# Patient Record
Sex: MALE | Race: WHITE | NOT HISPANIC OR LATINO | Employment: FULL TIME | URBAN - METROPOLITAN AREA
[De-identification: names, ages, dates, MRNs, and addresses within clinical notes are randomized per-mention and may not be internally consistent; named-entity substitution may affect disease eponyms.]

---

## 2017-06-21 ENCOUNTER — COMMUNICATION - HEALTHEAST (OUTPATIENT)
Dept: NURSING | Facility: CLINIC | Age: 42
End: 2017-06-21

## 2017-06-21 ENCOUNTER — OFFICE VISIT - HEALTHEAST (OUTPATIENT)
Dept: FAMILY MEDICINE | Facility: CLINIC | Age: 42
End: 2017-06-21

## 2017-06-21 DIAGNOSIS — E78.1 PURE HYPERGLYCERIDEMIA: ICD-10-CM

## 2017-06-21 DIAGNOSIS — Z13.1 SCREENING FOR DIABETES MELLITUS: ICD-10-CM

## 2017-06-21 DIAGNOSIS — R76.8 THYROID ANTIBODY POSITIVE: ICD-10-CM

## 2017-06-21 DIAGNOSIS — I86.1 VARICOCELE: ICD-10-CM

## 2017-06-21 DIAGNOSIS — Z00.00 ROUTINE GENERAL MEDICAL EXAMINATION AT A HEALTH CARE FACILITY: ICD-10-CM

## 2017-06-21 LAB
CHOLEST SERPL-MCNC: 175 MG/DL
FASTING STATUS PATIENT QL REPORTED: YES
HBA1C MFR BLD: 5.5 % (ref 3.5–6)
HDLC SERPL-MCNC: 35 MG/DL
LDLC SERPL CALC-MCNC: 105 MG/DL
TRIGL SERPL-MCNC: 174 MG/DL

## 2017-06-21 ASSESSMENT — MIFFLIN-ST. JEOR: SCORE: 1812.25

## 2018-01-29 ENCOUNTER — RECORDS - HEALTHEAST (OUTPATIENT)
Dept: ADMINISTRATIVE | Facility: OTHER | Age: 43
End: 2018-01-29

## 2018-06-27 ENCOUNTER — OFFICE VISIT - HEALTHEAST (OUTPATIENT)
Dept: FAMILY MEDICINE | Facility: CLINIC | Age: 43
End: 2018-06-27

## 2018-06-27 DIAGNOSIS — Z13.0 SCREENING FOR ENDOCRINE, NUTRITIONAL, METABOLIC AND IMMUNITY DISORDER: ICD-10-CM

## 2018-06-27 DIAGNOSIS — L80 VITILIGO: ICD-10-CM

## 2018-06-27 DIAGNOSIS — Z13.21 SCREENING FOR ENDOCRINE, NUTRITIONAL, METABOLIC AND IMMUNITY DISORDER: ICD-10-CM

## 2018-06-27 DIAGNOSIS — Z13.29 SCREENING FOR ENDOCRINE, NUTRITIONAL, METABOLIC AND IMMUNITY DISORDER: ICD-10-CM

## 2018-06-27 DIAGNOSIS — Z13.228 SCREENING FOR ENDOCRINE, NUTRITIONAL, METABOLIC AND IMMUNITY DISORDER: ICD-10-CM

## 2018-06-27 DIAGNOSIS — Z00.00 VISIT FOR PREVENTIVE HEALTH EXAMINATION: ICD-10-CM

## 2018-06-27 DIAGNOSIS — K21.9 GASTROESOPHAGEAL REFLUX DISEASE WITHOUT ESOPHAGITIS: ICD-10-CM

## 2018-06-27 LAB
ALBUMIN SERPL-MCNC: 4.1 G/DL (ref 3.5–5)
ALP SERPL-CCNC: 75 U/L (ref 45–120)
ALT SERPL W P-5'-P-CCNC: 78 U/L (ref 0–45)
ANION GAP SERPL CALCULATED.3IONS-SCNC: 7 MMOL/L (ref 5–18)
AST SERPL W P-5'-P-CCNC: 31 U/L (ref 0–40)
BILIRUB SERPL-MCNC: 1 MG/DL (ref 0–1)
BUN SERPL-MCNC: 14 MG/DL (ref 8–22)
CALCIUM SERPL-MCNC: 9.9 MG/DL (ref 8.5–10.5)
CHLORIDE BLD-SCNC: 108 MMOL/L (ref 98–107)
CHOLEST SERPL-MCNC: 180 MG/DL
CO2 SERPL-SCNC: 27 MMOL/L (ref 22–31)
CREAT SERPL-MCNC: 0.85 MG/DL (ref 0.7–1.3)
FASTING STATUS PATIENT QL REPORTED: YES
GFR SERPL CREATININE-BSD FRML MDRD: >60 ML/MIN/1.73M2
GLUCOSE BLD-MCNC: 106 MG/DL (ref 70–125)
HBA1C MFR BLD: 5.6 % (ref 3.5–6)
HDLC SERPL-MCNC: 31 MG/DL
LDLC SERPL CALC-MCNC: 111 MG/DL
POTASSIUM BLD-SCNC: 4.7 MMOL/L (ref 3.5–5)
PROT SERPL-MCNC: 7.1 G/DL (ref 6–8)
SODIUM SERPL-SCNC: 142 MMOL/L (ref 136–145)
TRIGL SERPL-MCNC: 192 MG/DL
TSH SERPL DL<=0.005 MIU/L-ACNC: 2.37 UIU/ML (ref 0.3–5)

## 2018-06-27 ASSESSMENT — MIFFLIN-ST. JEOR: SCORE: 1825.86

## 2018-06-28 LAB — THYROID PEROXIDASE ANTIBODIES - HISTORICAL: 35.5 IU/ML (ref 0–5.6)

## 2019-06-18 ENCOUNTER — OFFICE VISIT - HEALTHEAST (OUTPATIENT)
Dept: FAMILY MEDICINE | Facility: CLINIC | Age: 44
End: 2019-06-18

## 2019-06-18 DIAGNOSIS — Z13.29 SCREENING FOR ENDOCRINE, NUTRITIONAL, METABOLIC AND IMMUNITY DISORDER: ICD-10-CM

## 2019-06-18 DIAGNOSIS — Z13.21 SCREENING FOR ENDOCRINE, NUTRITIONAL, METABOLIC AND IMMUNITY DISORDER: ICD-10-CM

## 2019-06-18 DIAGNOSIS — Z13.0 SCREENING FOR ENDOCRINE, NUTRITIONAL, METABOLIC AND IMMUNITY DISORDER: ICD-10-CM

## 2019-06-18 DIAGNOSIS — Z13.228 SCREENING FOR ENDOCRINE, NUTRITIONAL, METABOLIC AND IMMUNITY DISORDER: ICD-10-CM

## 2019-06-18 DIAGNOSIS — Z00.00 VISIT FOR PREVENTIVE HEALTH EXAMINATION: ICD-10-CM

## 2019-06-18 LAB
ALBUMIN SERPL-MCNC: 4.3 G/DL (ref 3.5–5)
ALP SERPL-CCNC: 79 U/L (ref 45–120)
ALT SERPL W P-5'-P-CCNC: 27 U/L (ref 0–45)
ANION GAP SERPL CALCULATED.3IONS-SCNC: 9 MMOL/L (ref 5–18)
AST SERPL W P-5'-P-CCNC: 18 U/L (ref 0–40)
BILIRUB SERPL-MCNC: 0.9 MG/DL (ref 0–1)
BUN SERPL-MCNC: 13 MG/DL (ref 8–22)
CALCIUM SERPL-MCNC: 9.6 MG/DL (ref 8.5–10.5)
CHLORIDE BLD-SCNC: 109 MMOL/L (ref 98–107)
CHOLEST SERPL-MCNC: 155 MG/DL
CO2 SERPL-SCNC: 25 MMOL/L (ref 22–31)
CREAT SERPL-MCNC: 0.91 MG/DL (ref 0.7–1.3)
FASTING STATUS PATIENT QL REPORTED: YES
GFR SERPL CREATININE-BSD FRML MDRD: >60 ML/MIN/1.73M2
GLUCOSE BLD-MCNC: 98 MG/DL (ref 70–125)
HBA1C MFR BLD: 5.6 % (ref 3.5–6)
HDLC SERPL-MCNC: 31 MG/DL
LDLC SERPL CALC-MCNC: 94 MG/DL
POTASSIUM BLD-SCNC: 4.6 MMOL/L (ref 3.5–5)
PROT SERPL-MCNC: 7.1 G/DL (ref 6–8)
SODIUM SERPL-SCNC: 143 MMOL/L (ref 136–145)
TRIGL SERPL-MCNC: 150 MG/DL
TSH SERPL DL<=0.005 MIU/L-ACNC: 2.39 UIU/ML (ref 0.3–5)

## 2019-06-18 ASSESSMENT — MIFFLIN-ST. JEOR: SCORE: 1796.56

## 2021-01-21 ENCOUNTER — OFFICE VISIT (OUTPATIENT)
Dept: FAMILY MEDICINE | Facility: CLINIC | Age: 46
End: 2021-01-21
Payer: COMMERCIAL

## 2021-01-21 VITALS
WEIGHT: 211.8 LBS | TEMPERATURE: 97.5 F | SYSTOLIC BLOOD PRESSURE: 131 MMHG | HEART RATE: 81 BPM | OXYGEN SATURATION: 98 % | DIASTOLIC BLOOD PRESSURE: 86 MMHG

## 2021-01-21 DIAGNOSIS — M79.89 SOFT TISSUE MASS: Primary | ICD-10-CM

## 2021-01-21 PROCEDURE — 99213 OFFICE O/P EST LOW 20 MIN: CPT | Performed by: FAMILY MEDICINE

## 2021-01-21 SDOH — HEALTH STABILITY: MENTAL HEALTH: HOW OFTEN DO YOU HAVE A DRINK CONTAINING ALCOHOL?: NOT ASKED

## 2021-01-21 SDOH — HEALTH STABILITY: MENTAL HEALTH: HOW OFTEN DO YOU HAVE 6 OR MORE DRINKS ON ONE OCCASION?: NOT ASKED

## 2021-01-21 SDOH — HEALTH STABILITY: MENTAL HEALTH: HOW MANY STANDARD DRINKS CONTAINING ALCOHOL DO YOU HAVE ON A TYPICAL DAY?: NOT ASKED

## 2021-01-21 NOTE — PROGRESS NOTES
"  Assessment & Plan     Soft tissue mass  Consistency of a lipoma.  -Will send for US of mass  -Patient would prefer to have lesion removed if possible  -Follow-up after US    20 minutes spent on the date of the encounter doing chart review, history and exam, documentation and further activities as noted above    DO CHIO [facility]    Modesto Mar is a 45 year old who presents to clinic today for the following health issues:    HPI   Patient here for lump on back. Noticed a lump about 1 week ago, no tenderness, no redness, \"it feels like he has a breast implant in his back\". Under the skin on top of the muscle. States area does not cause him any pain or discomfort. Denies SOB, chest pain, weight loss, other skin lesions.       Review of Systems   CONSTITUTIONAL: NEGATIVE for fever, chills, change in weight  RESP: NEGATIVE for significant cough or SOB  CV: NEGATIVE for chest pain, palpitations or peripheral edema  GI: NEGATIVE for nausea, abdominal pain, heartburn, or change in bowel habits  : NEGATIVE for frequency, dysuria, or hematuria  MUSCULOSKELETAL: NEGATIVE for significant arthralgias or myalgia      Objective    /86 (BP Location: Right arm, Patient Position: Sitting, Cuff Size: Adult Regular)   Pulse 81   Temp 97.5  F (36.4  C) (Temporal)   Wt 96.1 kg (211 lb 12.8 oz)   SpO2 98%   There is no height or weight on file to calculate BMI.  Physical Exam   GENERAL: healthy, alert and no distress  EYES: Eyes grossly normal to inspection, conjunctivae and sclerae normal  NECK: no adenopathy, no asymmetry, masses, or scars and thyroid normal to palpation  RESP: lungs clear to auscultation - no rales, rhonchi or wheezes  CV: regular rate and rhythm, normal S1 S2, no S3 or S4, no murmur, click or rub, no peripheral edema and peripheral pulses strong  Back: left mid-back with 10 cm ovid soft-tissue mass, no pain to palpation, no erythema, no central pore or " opening.   SKIN: no suspicious lesions or rashes  NEURO: Normal strength and tone, mentation intact and speech normal  PSYCH: mentation appears normal, affect normal/bright

## 2021-02-08 ENCOUNTER — AMBULATORY - HEALTHEAST (OUTPATIENT)
Dept: NURSING | Facility: CLINIC | Age: 46
End: 2021-02-08

## 2021-02-10 ENCOUNTER — OFFICE VISIT (OUTPATIENT)
Dept: FAMILY MEDICINE | Facility: CLINIC | Age: 46
End: 2021-02-10
Payer: COMMERCIAL

## 2021-02-10 VITALS
DIASTOLIC BLOOD PRESSURE: 84 MMHG | RESPIRATION RATE: 16 BRPM | OXYGEN SATURATION: 96 % | TEMPERATURE: 98.1 F | SYSTOLIC BLOOD PRESSURE: 122 MMHG | WEIGHT: 209 LBS | HEART RATE: 56 BPM

## 2021-02-10 DIAGNOSIS — M54.12 LEFT CERVICAL RADICULOPATHY: Primary | ICD-10-CM

## 2021-02-10 PROCEDURE — 99213 OFFICE O/P EST LOW 20 MIN: CPT | Performed by: FAMILY MEDICINE

## 2021-02-10 RX ORDER — CYCLOBENZAPRINE HCL 10 MG
5-10 TABLET ORAL 3 TIMES DAILY PRN
Qty: 21 TABLET | Refills: 0 | Status: SHIPPED | OUTPATIENT
Start: 2021-02-10 | End: 2021-04-13

## 2021-02-10 NOTE — PROGRESS NOTES
Assessment & Plan     Left cervical radiculopathy  I am not sure if his symptoms are from neck versus shoulder.  Due to persistent nature of his symptoms it would be reasonable to start with physical therapy and Flexeril.  We agreed to hold off on oral steroid for now.  He does not have any significant weakness and we agreed to hold off on seeing sports medicine for now.  If symptoms are persistent he can send me a message to get a sports medicine referral.  - cyclobenzaprine (FLEXERIL) 10 MG tablet; Take 0.5-1 tablets (5-10 mg) by mouth 3 times daily as needed for muscle spasms  - CRISTAL PT, HAND, AND CHIROPRACTIC REFERRAL; Future     Regis Marino MD, MD  [facility]    Modesto Mar is a 45 year old who presents for the following health issues     HPI         Musculoskeletal problem/pain  Onset/Duration: 1 month   Description  Location: neck and shoulder - left  Joint Swelling: no  Redness: no  Pain: YES- mild  Warmth: no  Intensity:  moderate  Progression of Symptoms:  same  Accompanying signs and symptoms:   Fevers: no  Numbness/tingling/weakness: YES- down the arm   History  Trauma to the area: no  Recent illness:  no  Previous similar problem: no  Previous evaluation:  no  Precipitating or alleviating factors:  Aggravating factors include: different positions.   Therapies tried and outcome: moving neck helps     No specific trigger. No trauma.   Goes to left arm area. Goes to fingers - mostly index and middle finger and sometime ring finger.   Optometrist - sometime affects daily activity. No weakness. If moves neck in certain position it improves.   Avoiding nsaids.   No change in personal lifestyle.   Comes and goes. Certain neck position makes it better.   No chest heaviness. No worsening of symptoms with exertion.   No premature cardiac disease.   Father with diabetes.   Has a lipoma on his back and he will be having an ultrasound.  Wondering if it is  related.    Review of Systems         Objective    /84 (BP Location: Left arm, Patient Position: Sitting, Cuff Size: Adult Regular)   Pulse 56   Temp 98.1  F (36.7  C) (Oral)   Resp 16   Wt 94.8 kg (209 lb)   SpO2 96%   There is no height or weight on file to calculate BMI.  Physical Exam   Just below left shoulder blade large lipoma like structure present.  Neck range of motion not restricted.  Left shoulder range of motion not restricted.  Empty can sign negative.  Tinel sign negative both at elbow and wrist.  Phalen sign negative.  Reflexes are normal and symmetrical.

## 2021-02-10 NOTE — PATIENT INSTRUCTIONS
We are working hard to begin vaccinating more people against COVID-19. Currently, we are only vaccinating Phase 1a workers - healthcare workers who are unable to do their job remotely. Vaccine availability is very limited.      If you are a healthcare worker and you are unable to do your job remotely, please log in to [employer] using this link to see if we have openings and schedule an appointment. At your vaccine appointment, you will be asked to provide proof of employment as a health care worker. If you cannot, you will be turned away.     Vaccine appointments are being added as they become available. Please check your [employer] account frequently for availability.  If you have technical difficulty using [employer], call [phone] for assistance.     You can learn more about the state's phased approach to administering the vaccine, with details on each phase, here.      Phase 1b is the next group that will get vaccinated and includes frontline essential workers and adults 75 years of age and older. When we are able to start vaccinating this group, we will share that information on our website. Check this website to stay up to date on COVID-19 vaccination information.        Did you know?      You can schedule a video visit for follow-up appointments as well as future appointments for certain conditions.  Please see the below link.     [email or web address]    If you have not already done so,  I encourage you to sign up for [employer] ([email or web address]).  This will allow you to review your results, securely communicate with a provider, and schedule virtual visits as well.

## 2021-02-15 ENCOUNTER — ANCILLARY PROCEDURE (OUTPATIENT)
Dept: ULTRASOUND IMAGING | Facility: CLINIC | Age: 46
End: 2021-02-15
Attending: FAMILY MEDICINE
Payer: COMMERCIAL

## 2021-02-15 ENCOUNTER — THERAPY VISIT (OUTPATIENT)
Dept: PHYSICAL THERAPY | Facility: CLINIC | Age: 46
End: 2021-02-15
Attending: FAMILY MEDICINE
Payer: COMMERCIAL

## 2021-02-15 DIAGNOSIS — M54.12 CERVICAL RADICULITIS: ICD-10-CM

## 2021-02-15 DIAGNOSIS — M79.89 SOFT TISSUE MASS: ICD-10-CM

## 2021-02-15 DIAGNOSIS — M54.12 LEFT CERVICAL RADICULOPATHY: ICD-10-CM

## 2021-02-15 PROCEDURE — 97110 THERAPEUTIC EXERCISES: CPT | Mod: GP | Performed by: PHYSICAL THERAPIST

## 2021-02-15 PROCEDURE — 97161 PT EVAL LOW COMPLEX 20 MIN: CPT | Mod: GP | Performed by: PHYSICAL THERAPIST

## 2021-02-15 PROCEDURE — 76705 ECHO EXAM OF ABDOMEN: CPT | Performed by: STUDENT IN AN ORGANIZED HEALTH CARE EDUCATION/TRAINING PROGRAM

## 2021-02-15 NOTE — PROGRESS NOTES
[facility] Initial Evaluation  Subjective:  The history is provided by the patient.   Therapist Generated HPI Evaluation  Problem details: Pt presents with numbness/tingling in LUE. Started about 1 month ago. Notes gradual onset, no insidious onset or injury incident. Locates numbness/tingling from lateral LUE in mid arm, travels down posterior forearm and into digits 2-4. Also notes some tightness in L upper trap down into medial scap area. Symptoms are intermittent. Does report a history of H/A symptoms on occasion that are very minor. Symptoms worsen when he has to raise his arm and randomly throughout the day. Symptoms improve with specific head position of slight flexion and L rotation. Has been trying Flexeril. Pt is an optometrist..         Type of problem:  Cervical spine.                                                 Objective:  System              Cervical/Thoracic Evaluation    AROM:  AROM Cervical:    Flexion:          100% ROM, reduces sxs in LUE  Extension:       75% ROM, 3-4/10 LUE n/t  Rotation:         Left: 75% ROM, increases in n/t with increase in tightness in L upper trap     Right: 100% ROM, reduces n/t to 0/10  Side Bend:      Left:     Right:       Headaches: cervical  Cervical Myotomes:  normal                  DTR's:  normal        Neural Tension:    Left side:  Median positive.  Left side:  Radial and Ulnar  negative.    Cervical Dermatomes:  normal                                                                          General Evaluation:      Gross Strength:       Strength:  70#, 71#, 71#                            Posture:    Standing:   Rounded shoulders, forward head and thoracic kyphosis increased  Sitting:  Rounded shoulders, forward head and thoracic kyphosis increased                                           ROS    Assessment/Plan:    Patient is a 45 year old male with LUE complaints.    Patient has the following significant findings with corresponding  treatment plan.                Diagnosis 1:  LUE Cervical Radiculopathy  Pain -  hot/cold therapy, US, electric stimulation, mechanical traction, manual therapy, splint/taping/bracing/orthotics, self management, education, directional preference exercise and home program  Decreased ROM/flexibility - manual therapy, therapeutic exercise, therapeutic activity and home program  Impaired muscle performance - neuro re-education and home program  Decreased function - therapeutic activities and home program  Impaired posture - neuro re-education, therapeutic activities and home program    Therapy Evaluation Codes:   1) History comprised of:   Personal factors that impact the plan of care:      None.    Comorbidity factors that impact the plan of care are:      None.     Medications impacting care: None.  2) Examination of Body Systems comprised of:   Body structures and functions that impact the plan of care:      Cervical spine.   Activity limitations that impact the plan of care are:      Reaching .  3) Clinical presentation characteristics are:   Stable/Uncomplicated.  4) Decision-Making    Low complexity using standardized patient assessment instrument and/or measureable assessment of functional outcome.  Cumulative Therapy Evaluation is: Low complexity.    Previous and current functional limitations:  (See Goal Flow Sheet for this information)    Short term and Long term goals: (See Goal Flow Sheet for this information)     Communication ability:  Patient appears to be able to clearly communicate and understand verbal and written communication and follow directions correctly.  Treatment Explanation - The following has been discussed with the patient:   RX ordered/plan of care  Anticipated outcomes  Possible risks and side effects  This patient would benefit from PT intervention to resume normal activities.   Rehab potential is good.    Frequency:  1 X week, once daily  Duration:  for 8 weeks  Discharge Plan:  Achieve  all LTG.  Independent in home treatment program.  Reach maximal therapeutic benefit.    Please refer to the daily flowsheet for treatment today, total treatment time and time spent performing 1:1 timed codes.

## 2021-02-18 ENCOUNTER — MYC MEDICAL ADVICE (OUTPATIENT)
Dept: FAMILY MEDICINE | Facility: CLINIC | Age: 46
End: 2021-02-18

## 2021-02-18 DIAGNOSIS — D17.30 LIPOMA OF SKIN AND SUBCUTANEOUS TISSUE: Primary | ICD-10-CM

## 2021-02-18 NOTE — TELEPHONE ENCOUNTER
Dr Reynolds,    From 2/15/21 US    Impression: Large left lateral upper back soft tissue mass with  imaging features most suggestive of a lipoma.  Consider surgical  consultation given size    General surgery referral started for you to sign if ok or advise otherwise.     Ruthy Butler, RN, BSN

## 2021-02-18 NOTE — TELEPHONE ENCOUNTER
Writer responded via [employer]  KATHERYN JonesN, RN  [facility]

## 2021-03-01 ENCOUNTER — AMBULATORY - HEALTHEAST (OUTPATIENT)
Dept: NURSING | Facility: CLINIC | Age: 46
End: 2021-03-01

## 2021-03-01 NOTE — TELEPHONE ENCOUNTER
REFERRAL INFORMATION:    Referring Provider:  Dr. Efren Reynolds     Referring Clinic:  [facility]     Reason for Visit/Diagnosis: Back mass       FUTURE VISIT INFORMATION:    Appointment Date: 3/22/2021    Appointment Time: 11:30 AM      NOTES RECORD STATUS  DETAILS   OFFICE NOTE from Referring Provider Internal 1/21/2021 Office visit with Dr. Reynolds    OFFICE NOTE from Other Specialists N/A    HOSPITAL DISCHARGE SUMMARY/ ED VISITS  N/A    OPERATIVE REPORT N/A    ENDOSCOPY (EGD)  N/A    PERTINENT LABS Care Everywhere    PATHOLOGY REPORTS (RELATED) N/A    IMAGING (CT, MRI, US, XR)  Internal US Abdomen: 2/15/2021

## 2021-03-07 ENCOUNTER — HEALTH MAINTENANCE LETTER (OUTPATIENT)
Age: 46
End: 2021-03-07

## 2021-03-08 ENCOUNTER — THERAPY VISIT (OUTPATIENT)
Dept: PHYSICAL THERAPY | Facility: CLINIC | Age: 46
End: 2021-03-08
Payer: COMMERCIAL

## 2021-03-08 DIAGNOSIS — M54.12 CERVICAL RADICULITIS: ICD-10-CM

## 2021-03-08 PROCEDURE — 97110 THERAPEUTIC EXERCISES: CPT | Mod: GP | Performed by: PHYSICAL THERAPIST

## 2021-03-08 PROCEDURE — 97530 THERAPEUTIC ACTIVITIES: CPT | Mod: GP | Performed by: PHYSICAL THERAPIST

## 2021-03-15 ENCOUNTER — THERAPY VISIT (OUTPATIENT)
Dept: PHYSICAL THERAPY | Facility: CLINIC | Age: 46
End: 2021-03-15
Payer: COMMERCIAL

## 2021-03-15 DIAGNOSIS — M54.12 CERVICAL RADICULITIS: ICD-10-CM

## 2021-03-15 PROCEDURE — 97112 NEUROMUSCULAR REEDUCATION: CPT | Mod: GP | Performed by: PHYSICAL THERAPIST

## 2021-03-15 PROCEDURE — 97110 THERAPEUTIC EXERCISES: CPT | Mod: GP | Performed by: PHYSICAL THERAPIST

## 2021-03-19 ENCOUNTER — PATIENT OUTREACH (OUTPATIENT)
Dept: SURGERY | Facility: CLINIC | Age: 46
End: 2021-03-19

## 2021-03-19 NOTE — PROGRESS NOTES
Pre Visit Call and Assessment    Date of call:  03/19/2021    Phone numbers:  Cell number on file:    Telephone Information:   Mobile [phone]       Reached patient/confirmed appointment:  No - left message:   on cell phone  Patient care team/Primary provider:  No Ref-Primary, Physician  Preferred outpatient pharmacy:    [id number] - [place], [place] - [address]  [address]  [place] [address]  Phone: [phone] Fax: [phone]    [facility]/pharmacy #[id number] - [place], [place] - [address]  [address]  [place] [address]  Phone: [phone] Fax: [phone]    Referred to:  Dr. Daniel Barbosa    Reason for visit:  mass consult

## 2021-03-22 ENCOUNTER — TELEPHONE (OUTPATIENT)
Dept: SURGERY | Facility: CLINIC | Age: 46
End: 2021-03-22

## 2021-03-22 ENCOUNTER — THERAPY VISIT (OUTPATIENT)
Dept: PHYSICAL THERAPY | Facility: CLINIC | Age: 46
End: 2021-03-22
Payer: COMMERCIAL

## 2021-03-22 ENCOUNTER — TELEPHONE (OUTPATIENT)
Dept: PHYSICAL THERAPY | Facility: CLINIC | Age: 46
End: 2021-03-22

## 2021-03-22 ENCOUNTER — OFFICE VISIT (OUTPATIENT)
Dept: SURGERY | Facility: CLINIC | Age: 46
End: 2021-03-22
Attending: FAMILY MEDICINE
Payer: COMMERCIAL

## 2021-03-22 ENCOUNTER — PRE VISIT (OUTPATIENT)
Dept: SURGERY | Facility: CLINIC | Age: 46
End: 2021-03-22

## 2021-03-22 VITALS
HEIGHT: 71 IN | WEIGHT: 209 LBS | OXYGEN SATURATION: 99 % | HEART RATE: 58 BPM | RESPIRATION RATE: 18 BRPM | BODY MASS INDEX: 29.26 KG/M2 | SYSTOLIC BLOOD PRESSURE: 124 MMHG | TEMPERATURE: 98.4 F | DIASTOLIC BLOOD PRESSURE: 87 MMHG

## 2021-03-22 DIAGNOSIS — M54.12 CERVICAL RADICULITIS: ICD-10-CM

## 2021-03-22 DIAGNOSIS — D17.30 LIPOMA OF SKIN AND SUBCUTANEOUS TISSUE: ICD-10-CM

## 2021-03-22 PROCEDURE — 99202 OFFICE O/P NEW SF 15 MIN: CPT | Performed by: SURGERY

## 2021-03-22 PROCEDURE — 97112 NEUROMUSCULAR REEDUCATION: CPT | Mod: GP | Performed by: PHYSICAL THERAPIST

## 2021-03-22 PROCEDURE — 97110 THERAPEUTIC EXERCISES: CPT | Mod: GP | Performed by: PHYSICAL THERAPIST

## 2021-03-22 RX ORDER — ASCORBIC ACID 500 MG
500 TABLET ORAL
COMMUNITY

## 2021-03-22 ASSESSMENT — PAIN SCALES - GENERAL: PAINLEVEL: NO PAIN (0)

## 2021-03-22 ASSESSMENT — MIFFLIN-ST. JEOR: SCORE: 1845.15

## 2021-03-22 NOTE — NURSING NOTE
Pre and Post op Patient Education/Teaching Flowsheet  Relevant Diagnosis:  Mass  Teaching Topic:  Pre and post op teaching  Person(s) Involved in teaching:  Patient     Motivation Level:  Asks Questions:  Yes  Eager to Learn:  Yes  Cooperative:  Yes  Receptive (willing/able to accept information):  Yes  Any cultural factors/Hindu beliefs that may influence understanding or compliance?  No    Patient/caregiver/family demonstrates understanding of the following:  Reason for the appointment, diagnosis, and treatment plan:  Yes  Patient demonstrates understanding of the following:  Pre-op bowel prep:  No  Post-op pain management recommendations (medications, ice compress, binder/athletic supporter (if applicable), etc.:  Yes  Inguinal hernia patients:  Post-op urinary retention- discussed signs/symptoms and visit to ER for Manzano catheter placement and to stay in place for at least 48 hours:  Yes  Restrictions:  Yes  Medications to take the day of surgery:  Per PCP  Blood thinner medications discussed and when to stop (if applicable):  Yes  Wound care:  Yes  Diabetes medication management (if applicable):  Per PCP  Which situations necessitate calling provider and whom to contact:  Discussed how to contact the hospital, nurse, and clinic scheduling staff if necessary      Date and time of surgery:  TBD  Location of surgery: [facility]- 5th Floor  History and Physical and any other testing necessary prior to surgery:  Yes- surgeon will do day of procedure  Required time line for completion of History and Physical and any pre-op testing:  Yes  Discuss need for someone to drive patient home and stay with them for 24 hours:  No  Pre-op showering/scrub information with Surgical Scrub:  Yes  NPO Guidelines:  No restrictions  COVID-19 Testing:  Yes    Infection Prevention: Patient demonstrates understanding of the following:  Patient instructed on hand hygiene:  Yes  Surgical procedure  site care will be taught and will be reviewed at the time of discharge  Signs and symptoms of infection taught:  Yes  Wound care reviewed and will be taught at the time of discharge  Central venous catheter care will be taught at the time of discharge (if applicable)    Post-op follow-up:  Instructional materials used/given/mailed:  [facility] Booklet, post op teaching sheet, Map, Soap, and arrival/location information    Surgical instructions mailed to patient

## 2021-03-22 NOTE — LETTER
"3/22/2021       RE: Zaid Xie  [address]  [place] [address]     Dear Colleague,    Thank you for referring your patient, Zaid Xie, to the [facility] [place] [facility]. Please see a copy of my visit note below.    I saw Zaid Xie in clinic today in evaluation of a left back mass.  He noted it just a few months ago- unsure how long it has been present.  Just noticed it one day- no pain or issues with the site.  He has intermittent left arm pain/numbness that he is having worked up for a cervical spine issue.      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  He thinks he has gained about 10 lbs in the past year.    He obtained an US of the site recently that was consistent with a lipoma.  He desires its excision.      Does not take medications.  Has been vaccinated for covid    PE:  /87 (BP Location: Left arm, Patient Position: Sitting, Cuff Size: Adult Regular)   Pulse 58   Temp 98.4  F (36.9  C) (Oral)   Resp 18   Ht 1.803 m (5' 11\")   Wt 94.8 kg (209 lb)   SpO2 99%   BMI 29.15 kg/m      A+Ox3, NAD  RRR (HR in 50s)  No resp distress or wheezing  Abd soft  Along his left back, below the scapula there is a 10cm mobile mass.  Nontender, no skin changes. This feels superficial to the muscle and not adherent to surrounding structures.    A/P:  Large subcutaneous mass- no clear diagnosis.  He desires its excision and I agree that is appropriate.  It is not related to his intermittent numbness in his left arm (well away from any nerve to the arm).    -Excision under local  -Update H+P day of    Again, thank you for allowing me to participate in the care of your patient.      Sincerely,    Daniel Barbosa MD      "

## 2021-03-22 NOTE — PATIENT INSTRUCTIONS
You met with Dr. Daniel Barbosa.      Today's visit instructions:    Reminder:  Surgery Requirements  1. Your surgery will be at the [facility]- 5th Floor  2. You will need to arrive 1 hour early based on the location of your surgery.  3. You will need a Pre-op physical before your procedure- your surgeon will do this the day of surgery.   4. Stop any blood thinners, vitamins, minerals, or herbal supplements 5 days before surgery.  If you are taking a prescribed blood thinner please let us know for specific instructions.  5. You may eat/drink/drive without restrictions/limitations.  6. Wash with the soap (Antibacterial, Dial Complete Foam, Hibiclense, or soap given/mailed from the clinic) the night before and morning of surgery. See instructions in the Surgery Packet.  7. If you would like a procedure estimate please call Tona WOODS Financial Counselor at [phone] or [phone].    At this time, any patient that does not have COVID-19 testing within 4 days of surgery and results available to the surgeon and anesthesia team before the procedure may have their procedure postponed or canceled. We do this to keep our patients, providers, and employees safe. If you decline to test, then you will need to contact your surgeon to determine when or if your procedure will still take place.    OR    We highly encourage patients to get tested for COVID-19 at one of our designated [facility] testing sites. We process the tests in our lab, which allows us to get the results quickly. If you choose to get tested at a non-[facility] location, you will need to contact your primary care provider to make those arrangements and ensure the results are available to your surgeon before you arrive for your procedure. If we do not receive the results in time, your procedure may be postponed or canceled. Please make sure your test is collected 3-days prior to your procedure date. The  results will need to get faxed to [phone].     If you have questions please contact Trish ABRAMS during regular clinic hours, Monday through Friday 7:30 AM - 4:00 PM, or you can contact us via [employer] at anytime.       If you have urgent needs after-hours, weekends, or holidays please call the hospital at [phone] and ask to speak with our on-call General Surgery Team.    Appointment [phone], option #1   Nurse Advice (Trish): [phone]   Surgery Scheduler (Aline): [phone]  Fax: [phone]    After Your Mass/Lump Removal       Incision care     You may take a shower the day after surgery. Carefully wash your incision with soap and water. Do not submerge yourself in water (bath, whirlpool, hot tub, pool, lake) for 14 days after surgery.     Remove the gauze bandage 24 hours after surgery, but leave the medical tape (Steri-Strips) or glue in place. These will loosen and fall off on their own 5 to 7 days after surgery.       Always wash your hands before touching your incisions or removing bandages.     It is not unusual to form a collection of fluid or blood under your incision that may feel firm or squishy- it can take several weeks to months for your body to reabsorb it.  At times, it may even drain.  If that should happen keep the area clean with soap, water,  and cover with a clean gauze dressing. You can change this daily or as needed.     Other medicines     Wait to start aspirin or blood thinners until the day after surgery. You can continue your regular medicines at your normal time the day after surgery.     Your pain medicine may cause constipation (hard, dry stools). To help with this, take the stool softener your doctor gave you or an over-the-counter stool softener or laxative. You can stop taking this when you are no longer taking pain medicine and your bowel movements are back to normal.      For pain or discomfort     Take the narcotic pain medicine your doctor gave you  as needed and as instructed on the bottle. If you prefer to use over-the-counter medication, use acetaminophen (Tylenol) or ibuprofen (Advil, Motrin) as instructed on the box. Do not take Tylenol if it is in your narcotic pain medication.      Use an ice pack on your surgical cut (incision) for 20 minutes at a time as needed for the first 24 hours. Be sure to protect your skin by putting a cloth between the ice pack and your skin.      Activities   No driving until you feel it s safe to do so. Don t drive while taking narcotic pain medicine.      Diet   You can eat your regular meals after surgery.      Results   You should know any test results about 5 to 7 business days after surgery       When to call the doctor   Call your doctor if you have:     A fever above 101 F (38.3 C) (taken under the tongue), or a fever or chills lasting more than a day.     Redness at the incision site.     Any fluid or blood draining from the incision, especially if it smells bad.      Severe pain that doesn t improve with pain medicine.      We will call you 2 to 4 days after surgery to review this handout, answer questions and help arrange after-surgery care. If you have questions or concerns, please call [phone] during regular office hours. If you need to call after business hours, call [phone] and ask to page the surgeon on-call.

## 2021-03-22 NOTE — PROGRESS NOTES
"I saw Zaid Xie in clinic today in evaluation of a left back mass.  He noted it just a few months ago- unsure how long it has been present.  Just noticed it one day- no pain or issues with the site.  He has intermittent left arm pain/numbness that he is having worked up for a cervical spine issue.      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  He thinks he has gained about 10 lbs in the past year.    He obtained an US of the site recently that was consistent with a lipoma.  He desires its excision.      Does not take medications.  Has been vaccinated for covid    PE:  /87 (BP Location: Left arm, Patient Position: Sitting, Cuff Size: Adult Regular)   Pulse 58   Temp 98.4  F (36.9  C) (Oral)   Resp 18   Ht 1.803 m (5' 11\")   Wt 94.8 kg (209 lb)   SpO2 99%   BMI 29.15 kg/m      A+Ox3, NAD  RRR (HR in 50s)  No resp distress or wheezing  Abd soft  Along his left back, below the scapula there is a 10cm mobile mass.  Nontender, no skin changes. This feels superficial to the muscle and not adherent to surrounding structures.    A/P:  Large subcutaneous mass- no clear diagnosis.  He desires its excision and I agree that is appropriate.  It is not related to his intermittent numbness in his left arm (well away from any nerve to the arm).    -Excision under local  -Update H+P day of  "

## 2021-03-22 NOTE — NURSING NOTE
"Chief Complaint   Patient presents with     Consult     Pt here for mass consult       Vitals:    03/22/21 1124   BP: 124/87   BP Location: Left arm   Patient Position: Sitting   Cuff Size: Adult Regular   Pulse: 58   Resp: 18   Temp: 98.4  F (36.9  C)   TempSrc: Oral   SpO2: 99%   Weight: 94.8 kg (209 lb)   Height: 1.803 m (5' 11\")       Body mass index is 29.15 kg/m .      DERRICK DaviesT                      "

## 2021-03-22 NOTE — TELEPHONE ENCOUNTER
Case Request received to schedule an outpatient procedure under local anesthesia with Dr. Daniel Barbosa at the [facility] - procedures area if possible. H&P will be completed with surgeon on DOS.     msg left for patient to call back regarding scheduling. [employer] message also sent to patient to reply or call regarding scheduling.    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 I messaged ALIZA Chambers asking if any upcoming Mondays would work for Dr. Barbosa to do patient's procedure at the [facility]. I told him tentatively 4/19, but that I'd also have to clear it with [facility] OR scheduling - may only work if can be in the procedures area. Patient said Wednesday mornings also work well, but I told him Dr. Barbosa usually has clinic on Weds mornings. I told him I'd check on Mondays, and specifically about 4/19/2021, and get back to him to confirm or look at other dates.

## 2021-03-23 PROBLEM — D17.30 LIPOMA OF SKIN AND SUBCUTANEOUS TISSUE: Status: ACTIVE | Noted: 2021-03-23

## 2021-03-31 NOTE — TELEPHONE ENCOUNTER
Patient had some concerns about scheduling surgery soon after another matter the weekend prior to Monday 4/19/2021. So, scheduled surgery the following Monday 4/26/2021 instead.    Scheduling completed as follows, then Surgery Packet sent to patient via MyChart and Mail. Patient is having Local anesthesia only, so we discussed no eating or drinking restrictions, and no driving restrictions.    Your surgery is scheduled:    Date: Monday April 26, 2021  ________________________________    Time: 7:15 AM*  ________________________________    Please arrive at:  6:15 AM*  ______________________    Surgeon's Name: Dr. Daniel Barbosa  _______________________        Pre-Op Physical Fax Numbers:         [email or web address] Fax: [phone] / Phone:  [phone]        Your surgery is located at:  [facility]  [address] [place] [id number]  [phone]      *Times are tentative and may change. You can expect a call from the pre-admission nurses to confirm arrival and surgery start time if the times should change.    Pre-operative Physical appointment:   - to be scheduled/ completed by you at [facility] within 30 days before your surgery date (your plan was to combine this with an annual physical at the above clinic; if this plan changes, please let us know)    Pre-operative COVID-19 Test:   Pre-procedure asymptomatic COVID PCR:  4/22/2021 at 1:30 PM                                                 [facility]        [address] [place] [address]

## 2021-04-05 ENCOUNTER — THERAPY VISIT (OUTPATIENT)
Dept: PHYSICAL THERAPY | Facility: CLINIC | Age: 46
End: 2021-04-05
Payer: COMMERCIAL

## 2021-04-05 DIAGNOSIS — M54.12 CERVICAL RADICULITIS: ICD-10-CM

## 2021-04-05 PROCEDURE — 97110 THERAPEUTIC EXERCISES: CPT | Mod: GP | Performed by: PHYSICAL THERAPIST

## 2021-04-05 PROCEDURE — 97112 NEUROMUSCULAR REEDUCATION: CPT | Mod: GP | Performed by: PHYSICAL THERAPIST

## 2021-04-10 DIAGNOSIS — Z11.59 ENCOUNTER FOR SCREENING FOR OTHER VIRAL DISEASES: Primary | ICD-10-CM

## 2021-04-13 PROBLEM — I86.1 VARICOCELE: Status: ACTIVE | Noted: 2017-06-21

## 2021-04-13 PROBLEM — K21.9 ESOPHAGEAL REFLUX: Status: ACTIVE | Noted: 2021-04-13

## 2021-04-13 PROBLEM — E78.1 ESSENTIAL HYPERTRIGLYCERIDEMIA: Status: ACTIVE | Noted: 2021-04-13

## 2021-04-13 PROBLEM — K21.9 ESOPHAGEAL REFLUX: Status: RESOLVED | Noted: 2021-04-13 | Resolved: 2021-04-13

## 2021-04-14 ENCOUNTER — OFFICE VISIT (OUTPATIENT)
Dept: FAMILY MEDICINE | Facility: CLINIC | Age: 46
End: 2021-04-14
Payer: COMMERCIAL

## 2021-04-14 VITALS
WEIGHT: 207 LBS | HEIGHT: 71 IN | SYSTOLIC BLOOD PRESSURE: 122 MMHG | OXYGEN SATURATION: 98 % | BODY MASS INDEX: 28.98 KG/M2 | TEMPERATURE: 98.2 F | DIASTOLIC BLOOD PRESSURE: 76 MMHG | RESPIRATION RATE: 16 BRPM | HEART RATE: 61 BPM

## 2021-04-14 DIAGNOSIS — Z02.89 PHYSICAL EXAM FOR CAMP: ICD-10-CM

## 2021-04-14 DIAGNOSIS — L80 VITILIGO: ICD-10-CM

## 2021-04-14 DIAGNOSIS — Z01.818 PRE-OPERATIVE GENERAL PHYSICAL EXAMINATION: Primary | ICD-10-CM

## 2021-04-14 DIAGNOSIS — E78.1 ESSENTIAL HYPERTRIGLYCERIDEMIA: ICD-10-CM

## 2021-04-14 DIAGNOSIS — Z71.89 ADVANCED DIRECTIVES, COUNSELING/DISCUSSION: ICD-10-CM

## 2021-04-14 DIAGNOSIS — R73.01 IMPAIRED FASTING GLUCOSE: ICD-10-CM

## 2021-04-14 DIAGNOSIS — Z83.3 FAMILY HISTORY OF DIABETES MELLITUS: ICD-10-CM

## 2021-04-14 DIAGNOSIS — Z11.4 SCREENING FOR HIV (HUMAN IMMUNODEFICIENCY VIRUS): ICD-10-CM

## 2021-04-14 DIAGNOSIS — Z11.59 NEED FOR HEPATITIS C SCREENING TEST: ICD-10-CM

## 2021-04-14 DIAGNOSIS — D17.30 LIPOMA OF SKIN AND SUBCUTANEOUS TISSUE: ICD-10-CM

## 2021-04-14 LAB
ALBUMIN SERPL-MCNC: 4.2 G/DL (ref 3.4–5)
ALP SERPL-CCNC: 83 U/L (ref 40–150)
ALT SERPL W P-5'-P-CCNC: 63 U/L (ref 0–70)
ANION GAP SERPL CALCULATED.3IONS-SCNC: 5 MMOL/L (ref 3–14)
AST SERPL W P-5'-P-CCNC: 27 U/L (ref 0–45)
BASOPHILS # BLD AUTO: 0 10E9/L (ref 0–0.2)
BASOPHILS NFR BLD AUTO: 0.5 %
BILIRUB SERPL-MCNC: 0.7 MG/DL (ref 0.2–1.3)
BUN SERPL-MCNC: 12 MG/DL (ref 7–30)
CALCIUM SERPL-MCNC: 8.9 MG/DL (ref 8.5–10.1)
CHLORIDE SERPL-SCNC: 109 MMOL/L (ref 94–109)
CHOLEST SERPL-MCNC: 156 MG/DL
CO2 SERPL-SCNC: 28 MMOL/L (ref 20–32)
CREAT SERPL-MCNC: 0.87 MG/DL (ref 0.66–1.25)
DIFFERENTIAL METHOD BLD: ABNORMAL
EOSINOPHIL # BLD AUTO: 0.2 10E9/L (ref 0–0.7)
EOSINOPHIL NFR BLD AUTO: 4.6 %
ERYTHROCYTE [DISTWIDTH] IN BLOOD BY AUTOMATED COUNT: 11.9 % (ref 10–15)
GFR SERPL CREATININE-BSD FRML MDRD: >90 ML/MIN/{1.73_M2}
GLUCOSE SERPL-MCNC: 104 MG/DL (ref 70–99)
HCT VFR BLD AUTO: 43.3 % (ref 40–53)
HDLC SERPL-MCNC: 37 MG/DL
HGB BLD-MCNC: 14.8 G/DL (ref 13.3–17.7)
LDLC SERPL CALC-MCNC: 91 MG/DL
LYMPHOCYTES # BLD AUTO: 1.4 10E9/L (ref 0.8–5.3)
LYMPHOCYTES NFR BLD AUTO: 34.5 %
MCH RBC QN AUTO: 31 PG (ref 26.5–33)
MCHC RBC AUTO-ENTMCNC: 34.2 G/DL (ref 31.5–36.5)
MCV RBC AUTO: 91 FL (ref 78–100)
MONOCYTES # BLD AUTO: 0.4 10E9/L (ref 0–1.3)
MONOCYTES NFR BLD AUTO: 11.2 %
NEUTROPHILS # BLD AUTO: 1.9 10E9/L (ref 1.6–8.3)
NEUTROPHILS NFR BLD AUTO: 49.2 %
NONHDLC SERPL-MCNC: 119 MG/DL
PLATELET # BLD AUTO: 145 10E9/L (ref 150–450)
POTASSIUM SERPL-SCNC: 4.3 MMOL/L (ref 3.4–5.3)
PROT SERPL-MCNC: 7.5 G/DL (ref 6.8–8.8)
RBC # BLD AUTO: 4.77 10E12/L (ref 4.4–5.9)
SODIUM SERPL-SCNC: 142 MMOL/L (ref 133–144)
TRIGL SERPL-MCNC: 141 MG/DL
TSH SERPL DL<=0.005 MIU/L-ACNC: 1.87 MU/L (ref 0.4–4)
WBC # BLD AUTO: 3.9 10E9/L (ref 4–11)

## 2021-04-14 PROCEDURE — 80061 LIPID PANEL: CPT | Performed by: FAMILY MEDICINE

## 2021-04-14 PROCEDURE — 80050 GENERAL HEALTH PANEL: CPT | Performed by: FAMILY MEDICINE

## 2021-04-14 PROCEDURE — 83036 HEMOGLOBIN GLYCOSYLATED A1C: CPT | Performed by: FAMILY MEDICINE

## 2021-04-14 PROCEDURE — 86803 HEPATITIS C AB TEST: CPT | Performed by: FAMILY MEDICINE

## 2021-04-14 PROCEDURE — 99214 OFFICE O/P EST MOD 30 MIN: CPT | Performed by: FAMILY MEDICINE

## 2021-04-14 PROCEDURE — 36415 COLL VENOUS BLD VENIPUNCTURE: CPT | Performed by: FAMILY MEDICINE

## 2021-04-14 PROCEDURE — 87389 HIV-1 AG W/HIV-1&-2 AB AG IA: CPT | Performed by: FAMILY MEDICINE

## 2021-04-14 SDOH — HEALTH STABILITY: MENTAL HEALTH: HOW OFTEN DO YOU HAVE A DRINK CONTAINING ALCOHOL?: 4 OR MORE TIMES A WEEK

## 2021-04-14 SDOH — HEALTH STABILITY: MENTAL HEALTH: HOW MANY STANDARD DRINKS CONTAINING ALCOHOL DO YOU HAVE ON A TYPICAL DAY?: 1 OR 2

## 2021-04-14 ASSESSMENT — MIFFLIN-ST. JEOR: SCORE: 1840.04

## 2021-04-14 NOTE — RESULT ENCOUNTER NOTE
Kuldeep Mr. Xie,  Some of your results came back as follows    -HDL(good) cholesterol level is low which can increase your heart disease risk.  Its similar to what you had in 2019 at [facility]. A diet high in fat and simple carbohydrates, genetics and being overweight can contribute to this. Your LDL(bad) cholesterol and trigylceride levels are normal.  ADVISE: exercising 150 minutes of aerobic exercise per week (30 minutes 5 days per week or 50 minutes 3 days per week are options), and omega-3 fatty acids (fish oil) 6308-1602 mg daily are helpful to improve this.  In 12 months, you should recheck your fasting cholesterol panel by scheduling a lab-only appointment.  -Liver and gallbladder tests (ALT,AST, Alk phos,bilirubin) are normal.  -Kidney function (GFR) is normal.  -Sodium is normal.  -Potassium is normal.  -Calcium is normal.  -Glucose is slight elevated and may be a sign of early diabetes (prediabetes). ADVISE:: eating a low carbohydrate diet, exercising, trying to lose weight (if necessary) and rechecking your glucose level in 12 months.  If you have any further concerns please do not hesitate to contact us by message, phone or making an appointment.  Have a good day   Dr Haresh GUERRERO

## 2021-04-14 NOTE — RESULT ENCOUNTER NOTE
Kuldeep Mr. Xie,  Your results came back and are within acceptable limits. -TSH (thyroid stimulating hormone) level is normal which indicates normal thyroid function.. If you have any further concerns please do not hesitate to contact us by message, phone or making an appointment.  Have a good day   Dr Haresh GUERRERO

## 2021-04-14 NOTE — PATIENT INSTRUCTIONS
covid testing per surgeon 21  Labs and diagnostics today   EKG not indicated  If stable will clear for surgery otherwise may need additional work up   Take no meds am of surgery  Hold aspirin, antiinflammatories like motrin aleve etc, fish oil, all vitamins and glucosamine preferablly 7 days prior to surgery  Will fax /send note to surgeon once completed   Camp physical & preventive labs done  Cannot not do preventive phsyical at same time as preop physical due to limitations in time and insurance   Work on advance directives  Continue routine care & preventive physical with PCP Dr Marino  Preparing for Your Surgery  Getting started  A nurse will call you to review your health history and instructions. They will give you an arrival time based on your scheduled surgery time.  Please be ready to share the following:    Your doctor's clinic name and phone number    Your medical, surgical and anesthesia history    A list of allergies and sensitivities    A list of medicines, including herbal treatments and over-the-counter drugs    Whether the patient has a legal guardian (ask how to send us the papers in advance)  If you have a child who's having surgery, please ask for a copy of Preparing for Your Child's Surgery.    Preparing for surgery    Within 30 days of surgery: Have a pre-op exam (sometimes called an H&P, or History and Physical). This can be done at a clinic or pre-operative center.  ? If you're having a , you may not need this exam. Talk to your care team    At your pre-op exam, talk to your care team about all medicines you take. If you need to stop any medicines before surgery, ask when to start taking them again.  ? We do this for your safety. Many medicines can make you bleed too much during surgery. Some change how well surgery (anesthesia) drugs work.    Call your insurance company to let them know you're having surgery. (If you don't have insurance, call [phone].)    Call your clinic  if there's any change in your health. This includes signs of a cold or flu (sore throat, runny nose, cough, rash, fever). It also includes a scrape or scratch near the surgery site.    If you have questions on the day of surgery, call your hospital or surgery center.  Eating and drinking guidelines  For your safety: Unless your surgeon tells you otherwise, follow the guidelines below.    Eat and drink as usual until 8 hours before surgery. After that, no food or milk.    Drink clear liquids until 2 hours before surgery. These are liquids you can see through, like water, Gatorade and Propel Water. You may also have black coffee and tea (no cream or milk).    Nothing by mouth within 2 hours of surgery. This includes gum, candy and breath mints.    If you drink, stop drinking alcohol the night before surgery.    If your care team tells you to take medicine on the morning of surgery, it's okay to take it with a sip of water.  Preventing infection    Shower or bathe the night before and morning of your surgery. Follow the instructions your clinic gave you. (If no instructions, use regular soap.)    Don't shave or clip hair near your surgery site. We'll remove the hair if needed.    Don't smoke or vape the morning of surgery. You may chew nicotine gum up to 2 hours before surgery. A nicotine patch is okay.  ? Note: Some surgeries require you to completely quit smoking and nicotine. Check with your surgeon.    Your care team will make every effort to keep you safe from infection. We will:  ? Clean our hands often with soap and water (or an alcohol-based hand rub).  ? Clean the skin at your surgery site with a special soap that kills germs.  ? Give you a special gown to keep you warm. (Cold raises the risk of infection.)  ? Wear special hair covers, masks, gowns and gloves during surgery.  ? Give antibiotic medicine, if prescribed. Not all surgeries need antibiotics.  What to bring on the day of surgery    Photo ID and  insurance card    Copy of your health care directive, if you have one    Glasses and hearing aides (bring cases)  ? You can't wear contacts during surgery    Inhaler and eye drops, if you use them (tell us about these when you arrive)    CPAP machine or breathing device, if you use them    A few personal items, if spending the night    If you have . . .  ? A pacemaker or ICD (cardiac defibrillator): Bring the ID card.  ? An implanted stimulator: Bring the remote control.  ? A legal guardian: Bring a copy of the certified (court-stamped) guardianship papers.  Please remove any jewelry, including body piercings. Leave jewelry and other valuables at home.  If you're going home the day of surgery  Important: If you don't follow the rules below, we must cancel your surgery.     Arrange for someone to drive you home after surgery. You may not drive, take a taxi or take public transportation by yourself (unless you'll have local anesthesia only).    Arrange for a responsible adult to stay with you overnight. If you don't, we may keep you in the hospital overnight, and you may need to pay the costs yourself.  Questions?   If you have any questions for your care team, list them here: _________________________________________________________________________________________________________________________________________________________________________________________________________________________________________________________________________________________________________________________  For informational purposes only. Not to replace the advice of your health care provider. Copyright   2003, 2019 [facility]. All rights reserved. Clinically reviewed by Samantha Dc MD. [email or web address] [id number] - REV 4/20.    Preventive Health Recommendations  Male Ages 40 to 49    Yearly exam:             See your health care provider every year in order to  o   Review health changes.   o   Discuss preventive care.    o   Review  your medicines if your doctor has prescribed any.    You should be tested each year for STDs (sexually transmitted diseases) if you re at risk.     Have a cholesterol test every 5 years.     Have a colonoscopy (test for colon cancer) if someone in your family has had colon cancer or polyps before age 50.     After age 45, have a diabetes test (fasting glucose). If you are at risk for diabetes, you should have this test every 3 years.      Talk with your health care provider about whether or not a prostate cancer screening test (PSA) is right for you.    Shots: Get a flu shot each year. Get a tetanus shot every 10 years.     Nutrition:    Eat at least 5 servings of fruits and vegetables daily.     Eat whole-grain bread, whole-wheat pasta and brown rice instead of white grains and rice.     Get adequate Calcium and Vitamin D.     Lifestyle    Exercise for at least 150 minutes a week (30 minutes a day, 5 days a week). This will help you control your weight and prevent disease.     Limit alcohol to one drink per day.     No smoking.     Wear sunscreen to prevent skin cancer.     See your dentist every six months for an exam and cleaning.

## 2021-04-15 ENCOUNTER — MYC MEDICAL ADVICE (OUTPATIENT)
Dept: FAMILY MEDICINE | Facility: CLINIC | Age: 46
End: 2021-04-15

## 2021-04-15 DIAGNOSIS — Z83.3 FAMILY HISTORY OF DIABETES MELLITUS: ICD-10-CM

## 2021-04-15 DIAGNOSIS — R73.01 IMPAIRED FASTING GLUCOSE: Primary | ICD-10-CM

## 2021-04-15 DIAGNOSIS — E78.1 ESSENTIAL HYPERTRIGLYCERIDEMIA: ICD-10-CM

## 2021-04-15 LAB
HBA1C MFR BLD: 5.6 % (ref 0–5.6)
HCV AB SERPL QL IA: NONREACTIVE
HIV 1+2 AB+HIV1 P24 AG SERPL QL IA: NONREACTIVE

## 2021-04-15 NOTE — RESULT ENCOUNTER NOTE
Kuldeep Mr. Xie,  Your results came back and are within acceptable limits. -Hepatitis C antibody screen test shows no signs of a previous hepatitis C infection.  -HIV test is normal.. If you have any further concerns please do not hesitate to contact us by message, phone or making an appointment.  Have a good day   Dr Haresh GUERRERO

## 2021-04-15 NOTE — TELEPHONE ENCOUNTER
A1c is not a routine screening preventive test but can be ordered if glucose is elevated  glucosefasting was 104 so risk of DM is low bt at risk given in impaired glucose range ( prediabetes s) & family history   Will try to add that to labs drawn yesterday  Triage can you have lab add HBA1c thanks

## 2021-04-15 NOTE — RESULT ENCOUNTER NOTE
Kuldeep Mr. Xie,  Your results came back and your HBA1c was wnl. You do not have diabetes.  If you have any further concerns please do not hesitate to contact us by message, phone or making an appointment.  Have a good day   Dr Haresh GUERRERO

## 2021-04-15 NOTE — TELEPHONE ENCOUNTER
Writer spoke with Trudi in lab and A1C can be added to yesterday's lab sample.    Writer responded via [id number].    KATHERYN JonesN, RN  [facility]

## 2021-04-19 ENCOUNTER — THERAPY VISIT (OUTPATIENT)
Dept: PHYSICAL THERAPY | Facility: CLINIC | Age: 46
End: 2021-04-19
Payer: COMMERCIAL

## 2021-04-19 DIAGNOSIS — M54.12 CERVICAL RADICULITIS: ICD-10-CM

## 2021-04-19 PROCEDURE — 97110 THERAPEUTIC EXERCISES: CPT | Mod: GP | Performed by: PHYSICAL THERAPIST

## 2021-04-19 PROCEDURE — 97112 NEUROMUSCULAR REEDUCATION: CPT | Mod: GP | Performed by: PHYSICAL THERAPIST

## 2021-04-19 PROCEDURE — 97530 THERAPEUTIC ACTIVITIES: CPT | Mod: GP | Performed by: PHYSICAL THERAPIST

## 2021-04-22 DIAGNOSIS — Z11.59 ENCOUNTER FOR SCREENING FOR OTHER VIRAL DISEASES: ICD-10-CM

## 2021-04-22 LAB
SARS-COV-2 RNA RESP QL NAA+PROBE: NORMAL
SPECIMEN SOURCE: NORMAL

## 2021-04-22 PROCEDURE — U0005 INFEC AGEN DETEC AMPLI PROBE: HCPCS | Performed by: SURGERY

## 2021-04-22 PROCEDURE — U0003 INFECTIOUS AGENT DETECTION BY NUCLEIC ACID (DNA OR RNA); SEVERE ACUTE RESPIRATORY SYNDROME CORONAVIRUS 2 (SARS-COV-2) (CORONAVIRUS DISEASE [COVID-19]), AMPLIFIED PROBE TECHNIQUE, MAKING USE OF HIGH THROUGHPUT TECHNOLOGIES AS DESCRIBED BY CMS-2020-01-R: HCPCS | Performed by: SURGERY

## 2021-04-24 LAB
LABORATORY COMMENT REPORT: NORMAL
SARS-COV-2 RNA RESP QL NAA+PROBE: NEGATIVE
SPECIMEN SOURCE: NORMAL

## 2021-04-26 ENCOUNTER — HOSPITAL ENCOUNTER (OUTPATIENT)
Facility: AMBULATORY SURGERY CENTER | Age: 46
Discharge: HOME | End: 2021-04-26
Attending: SURGERY | Admitting: SURGERY
Payer: COMMERCIAL

## 2021-04-26 VITALS
RESPIRATION RATE: 14 BRPM | BODY MASS INDEX: 29.26 KG/M2 | HEIGHT: 71 IN | OXYGEN SATURATION: 99 % | SYSTOLIC BLOOD PRESSURE: 130 MMHG | DIASTOLIC BLOOD PRESSURE: 95 MMHG | HEART RATE: 55 BPM | TEMPERATURE: 97.6 F | WEIGHT: 209 LBS

## 2021-04-26 DIAGNOSIS — D17.30 LIPOMA OF SKIN AND SUBCUTANEOUS TISSUE: ICD-10-CM

## 2021-04-26 PROCEDURE — 88304 TISSUE EXAM BY PATHOLOGIST: CPT | Mod: 26 | Performed by: PATHOLOGY

## 2021-04-26 PROCEDURE — 11043 DBRDMT MUSC&/FSCA 1ST 20/<: CPT

## 2021-04-26 RX ORDER — OXYCODONE HYDROCHLORIDE 5 MG/1
5 TABLET ORAL EVERY 6 HOURS PRN
Qty: 10 TABLET | Refills: 0 | Status: SHIPPED | OUTPATIENT
Start: 2021-04-26 | End: 2021-04-29

## 2021-04-26 RX ORDER — BUPIVACAINE HYDROCHLORIDE 2.5 MG/ML
INJECTION, SOLUTION INFILTRATION; PERINEURAL PRN
Status: DISCONTINUED | OUTPATIENT
Start: 2021-04-26 | End: 2021-04-26 | Stop reason: HOSPADM

## 2021-04-26 RX ORDER — LIDOCAINE HYDROCHLORIDE AND EPINEPHRINE 10; 10 MG/ML; UG/ML
INJECTION, SOLUTION INFILTRATION; PERINEURAL PRN
Status: DISCONTINUED | OUTPATIENT
Start: 2021-04-26 | End: 2021-04-26 | Stop reason: HOSPADM

## 2021-04-26 ASSESSMENT — MIFFLIN-ST. JEOR: SCORE: 1845.15

## 2021-04-26 NOTE — OP NOTE
[facility] Operative Note    Pre-operative diagnosis: Lipoma of skin and subcutaneous tissue [D17.30]   Post-operative diagnosis same   Procedure: Procedure(s):  EXCISION, LESION, BACK   Surgeon(s): Surgeon(s) and Role:     * Daniel Barbosa MD - Primary   Estimated blood loss: 5ml    Specimens: ID Type Source Tests Collected by Time Destination   A : Left back mass Tissue Back SURGICAL PATHOLOGY EXAM Daniel Barbosa MD 4/26/2021  8:55 AM       Findings: Large lipoma- deep to the latissimus muscle.  Muscle was sharply split to access the lipoma       Zaid Xie was brought to the OR and placed prone on the operating table.  The left shoulder was prepped and draped in sterile fashion and a time out was performed.  Local anesthetic was injected and a 10 cm incision over the palpable mass was sharply made.  Dissection down to the latissimus was performed with cautery- the mass was deep to the fascia and deep to this muscle.  The fascia was sharply cut and the muscle split along its fibers for 5cm.  This exposed the fatty mass. The mass was circumferentially dissected free of surrounding tissue using blunt dissection.  The mass was removed-measuring 14cm- and sent to pathology.      Hemostasis was obtained and additional local anesthetic injected.  The fascia along the latissimus was closed with figure of 8 sutures of 2.0 vicryl.  The incision was closed in layers with 3.0 vicryl and 4.0 monocryl.  Steri-strips were applied.  Zaid Xie was then brought to the recovery area in stable condition.    I performed the procedure.

## 2021-04-26 NOTE — DISCHARGE INSTRUCTIONS
Lipoma (Removed)   A lipoma is a growth made of fatty tissue. It is not cancer (benign). It looks like a soft lump. A lipoma may be removed (excised) because you don t like how it looks. Or it may be removed if it is painful or growing. A lipoma is made of fat cells. But it is not linked to diet. And it does not mean that you are overweight.  Home care  The following guidelines will help you care for your wound:    Keep the wound clean and dry. If a bandage was applied and it gets wet or dirty, replace it. Otherwise, leave it in place for the first 24 hours. Then change it once a day or as directed.    If stitches (sutures) were used, clean the wound daily:  ? After removing the bandage, wash the area with soap and water. Use a cotton swab to loosen and remove any blood or crust that forms.  ? After cleaning, apply a thin layer of petroleum ointment. Or your healthcare provider may advise an antibiotic ointment. This will keep the wound clean and make it easier to remove the stitches. Reapply the bandage.  ? You may shower as normal after the first 24 hours. But don t soak the area in water (no baths or swimming) for 2 weeks.    An ice pack over the incision will help with pain control    You may use over-the-counter pain medicine to control pain, unless another medicine was given. Talk with your provider before using these medicines if you have long-term (chronic) liver or kidney disease, or ever had a stomach ulcer or GI (gastrointestinal) bleeding.  Follow-up care  Most skin wounds heal in 10 days. But an infection may sometimes occur, even with correct treatment. Check the wound daily for the signs of infection listed below. Stitches should be taken out in 7 to 14 days. You may have surgical tape closures. If these have not fallen off after 7 days, you can remove them yourself unless you were told otherwise.  When to get medical advice  Call your healthcare provider right away if any of these occur:    Pain in  the wound gets worse    Redness, swelling, or pus coming from the wound    Fever of 100.4 F (38 C) or higher, or as directed by your provider    Stitches come apart or fall out, or surgical tape falls off before 5 days    The wound edges reopen    Numb feeling that doesn t go away by the time stitches are removed  Nidia last reviewed this educational content on 8/1/2019 2000-2021 [employer]. All rights reserved. This information is not intended as a substitute for professional medical care. Always follow your healthcare professional's instructions.

## 2021-04-28 ENCOUNTER — PATIENT OUTREACH (OUTPATIENT)
Dept: SURGERY | Facility: CLINIC | Age: 46
End: 2021-04-28

## 2021-04-28 LAB — COPATH REPORT: NORMAL

## 2021-04-28 NOTE — PROGRESS NOTES
Gabriel Curry is a patient of Dr. Fab Barbosa that underwent excision of a soft tissue mass approximately 2 days ago (4/28).  Attempted to contact patient via telephone for a status update and review post op teaching.  LM on  to call office.  Await return call.      Of note:  Pathology:  Pending  Wound:  Steri-strips  Follow-up:  Routine  Restrictions:  - No activity restrictions  New medications:  Norco  Equipment/Supplies:  None    ADDENDUM  04/28/21  2:18 PM  Patient returned call and LM on  nurse line.  Attempted to reach patient with no answer. LM on to call office with update and how he would like to receive pathology results.  Trish Betancourt RN, BSN, CNOR, RNFA, CBCN  [facility]    ADDENDUM  04/28/21  3:23 PM  Patient returned call and reached .  LM stating that he was sore the night of the procedure and took narcotic pain medication but has not transitioned to OTC IBU.  Offers no concerns and has returned to work.  No wound concerns and he has reviewed pathology.    RTC PRN    Pathology:  Copath Report Patient Name: GABRIEL CURRY   MR#: [id number]   Specimen #: [id number]   Collected: 4/26/2021   Received: 4/26/2021   Reported: 4/28/2021 10:46   Ordering Phy(s): FAB BARBOSA     For improved result formatting, select 'View Enhanced Report Format' under    Linked Documents section.     SPECIMEN(S):   Lipoma, left back mass     FINAL DIAGNOSIS:   Soft tissue, left back mass:   - Lipoma

## 2021-05-29 NOTE — PROGRESS NOTES
Assessment:      Healthy male exam.        Plan:     1.  Vitiligo, patient will continue to follow with dermatologist.  Hashimoto thyroiditis with mildly elevated TPA but normal thyroid function, will continue to monitor thyroid function.  2. Patient Counseling:  --Nutrition: Stressed importance of moderation in sodium/caffeine intake, saturated fat and cholesterol, caloric balance, sufficient intake of fresh fruits, vegetables, fiber, calcium, iron.  --Discussed the issue of calcium supplement, and the daily use of baby aspirin.  --Exercise: Stressed the importance of regular exercise.   --Substance Abuse: Discussed cessation/primary prevention of tobacco, alcohol, or other drug use; driving or other dangerous activities under the influence; availability of treatment for abuse.    --Sexuality: Discussed sexually transmitted diseases, partner selection, use of condoms, avoidance of unintended pregnancy.  --Injury prevention: Discussed safety belts, safety helmets, smoke detector, smoking near bedding or upholstery.   --Dental health: Discussed importance of regular tooth brushing, flossing, and dental visits.  --Immunizations reviewed.  --Discussed timing and benefits of screening colonoscopy and alternative options.  --After hours service discussed with patient             -- I have had an Advance Directives discussion with the patient.  The following high BMI interventions were performed this visit: encouragement to exercise and weight loss from baseline weight    3. Discussed the patient's BMI with him.  The BMI is above average; BMI management plan is completed  4. Follow up as needed for acute illness     Subjective:      Zaid Xie is a 43 y.o. male who presents for an annual exam. The patient reports that there is not domestic violence in his life.   History of vitiligo, but appears stable, follow-up with dermatologist.  Mild Hashimoto thyroiditis, with normal thyroid function.    Healthy Habits:    Regular Exercise: Yes  Sunscreen Use: Yes  Healthy Diet: Yes  Dental Visits Regularly: Yes  Seat Belt: Yes  Sexually active: Yes  Monthly Self Testicular Exams:  Yes  Hemoccults: N/A  Flex Sig: N/A  Colonoscopy: No  Lipid Profile: Yes  Glucose Screen: Yes  Prevention of Osteoporosis: Yes  Last Dexa: N/A  Guns at Home:  N/A      Immunization History   Administered Date(s) Administered     Hep A, Adult IM (19yr & older) 11/21/2007, 05/21/2008     Hep A, historic 11/21/2007, 05/21/2008     Influenza, inj, historic,unspecified 11/21/2007     Influenza,seasonal, Inj IIV3 02/12/2007, 11/21/2007     Td, Adult, Absorbed 10/26/2006     Td, adult adsorbed, PF 06/20/2016     Td,adult,historic,unspecified 06/07/2006     Tdap 06/07/2006     Immunization status: stated as current, but no records available.  Vision Screening:both eyes  Hearing: PASS     Current Outpatient Medications   Medication Sig Dispense Refill     ascorbic acid, vitamin C, (ASCORBIC ACID WITH WIN HIPS) 500 MG tablet Take 500 mg by mouth daily.       LUTEIN ORAL Take by mouth.       omega-3 fatty acids (FISH OIL) 500 mg cap Take by mouth.       No current facility-administered medications for this visit.      No past medical history on file.  Past Surgical History:   Procedure Laterality Date     OR KNEE SCOPE,DIAGNOSTIC      Description: Arthroscopy Knee Right;  Recorded: 05/07/2013;     OR REMOVAL OF SPERM DUCT(S)      Description: Surgery Of Male Genitalia Vasectomy;  Recorded: 05/07/2013;     Nsaids (non-steroidal anti-inflammatory drug)  Family History   Problem Relation Age of Onset     Stroke Mother         PFO     Seizures Mother      Diabetes Father      Diabetes Paternal Grandmother      Social History     Socioeconomic History     Marital status:      Spouse name: Not on file     Number of children: Not on file     Years of education: Not on file     Highest education level: Not on file   Occupational History     Not on file   Social  "Needs     Financial resource strain: Not on file     Food insecurity:     Worry: Not on file     Inability: Not on file     Transportation needs:     Medical: Not on file     Non-medical: Not on file   Tobacco Use     Smoking status: Never Smoker     Smokeless tobacco: Never Used   Substance and Sexual Activity     Alcohol use: Yes     Alcohol/week: 3.6 oz     Types: 2 Glasses of wine, 4 Cans of beer per week     Comment: Moderate use     Drug use: No     Sexual activity: Yes     Partners: Female     Comment:  1996   Lifestyle     Physical activity:     Days per week: Not on file     Minutes per session: Not on file     Stress: Not on file   Relationships     Social connections:     Talks on phone: Not on file     Gets together: Not on file     Attends Rastafarian service: Not on file     Active member of club or organization: Not on file     Attends meetings of clubs or organizations: Not on file     Relationship status: Not on file     Intimate partner violence:     Fear of current or ex partner: Not on file     Emotionally abused: Not on file     Physically abused: Not on file     Forced sexual activity: Not on file   Other Topics Concern     Not on file   Social History Narrative     Not on file     No specialty comments available.  Review of Systems  General:  Denies problem  Eyes: Denies problem  Ears/Nose/Throat: Denies problem  Cardiovascular: Denies problem  Respiratory:  Denies problem  Gastrointestinal:  Denies problem  Genitourinary: Denies problem  Musculoskeletal:  Denies problem  Skin: Denies problem  Neurologic: Denies problem  Psychiatric: Denies problem  Endocrine: Denies problem  Heme/Lymphatic: Denies problem   Allergic/Immunologic: Denies problem        Objective:     Vitals:    06/18/19 0757   BP: 110/80   Pulse: 68   SpO2: 98%   Weight: 196 lb (88.9 kg)   Height: 5' 11.65\" (1.82 m)     Body mass index is 26.84 kg/m .    Physical  General Appearance: Alert, cooperative, no distress, " appears stated age  Head: Normocephalic, without obvious abnormality, atraumatic  Eyes: PERRL, conjunctiva/corneas clear, EOM's intact  Ears: Normal TM's and external ear canals, both ears  Nose: Nares normal, septum midline,mucosa normal, no drainage  Throat: Lips, mucosa, and tongue normal; teeth and gums normal  Neck: Supple, symmetrical, trachea midline, no adenopathy;  thyroid: not enlarged, symmetric, no tenderness/mass/nodules; no carotid bruit or JVD  Back: Symmetric, no curvature, ROM normal, no CVA tenderness  Lungs: Clear to auscultation bilaterally, respirations unlabored  Heart: Regular rate and rhythm, S1 and S2 normal, no murmur, rub, or gallop,  Abdomen: Soft, non-tender, bowel sounds active all four quadrants,  no masses, no organomegaly  Genitourinary: Penis normal. Right and left testis are descended.No palpable masses.   Rectal: No visible external lesion  Musculoskeletal: Normal range of motion. No joint swelling or deformity.   Extremities: Extremities normal, atraumatic, no cyanosis or edema  Skin: Skin color, texture, turgor normal, no rashes or lesions  Lymph nodes: Cervical, supraclavicular, and axillary nodes normal  Neurologic: He is alert. He has normal reflexes.   Psychiatric: He has a normal mood and affect.        MD Zaid Akers was seen today for annual exam.    Diagnoses and all orders for this visit:    Visit for preventive health examination    Screening for endocrine, nutritional, metabolic and immunity disorder  -     Lipid Cascade  -     Comprehensive Metabolic Panel  -     Glycosylated Hemoglobin A1c  -     Thyroid Stimulating Hormone (TSH)

## 2021-05-31 VITALS — WEIGHT: 200 LBS | HEIGHT: 72 IN | BODY MASS INDEX: 27.09 KG/M2

## 2021-06-01 VITALS — HEIGHT: 72 IN | BODY MASS INDEX: 27.5 KG/M2 | WEIGHT: 203 LBS

## 2021-06-03 VITALS — BODY MASS INDEX: 26.55 KG/M2 | WEIGHT: 196 LBS | HEIGHT: 72 IN

## 2021-06-11 NOTE — PROGRESS NOTES
MALE PREVENTIVE EXAM    Subjective:   Chief Complaint:  Annual Exam (fasting)    HPI:  Zaid Xie is a 41 y.o. male who presents for routine physical exam.  He is a previous patient of Dr. Sanchez.  PMH notable for dyslipidemia, vitiligo, reflux.    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   with three boys.  Active as a  leader.      He has followed with [facility] Dermatology for treatment for vitiligo and regular skin checks.  Has had dysplastic nevi removed in the past, no personal history of skin cancer.  He did have labs done last summer for them that indicated elevated thyroid peroxidase antibody.  Did not follow up after that.  No prior history of thyroid disease.  He has noted weight gain of 5 lbs in the last year.      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  Will be traveling to [place] for a hiking trip.      Preventive Health:  Reviewed and recommended screening and treatment recommendations:  PSA: The natural history of prostate cancer and ongoing controversy regarding screening and potential treatment outcomes of prostate cancer has been discussed with the patient. The meaning of a false positive PSA and a false negative PSA has been discussed. He indicates understanding of the limitations of this screening test.  He declines today.    Immunizations: up to date    Health Habits:    Exercise: yes, hiking and cardio 2-3x/week  Balanced Diet: yes  Seat Belt Use: yes  Calcium intake/Osteoporosis prevention: no  Guns: NO  Sun Screen: YES  Dental Care: YES    PMH:   Patient Active Problem List   Diagnosis     Esophageal reflux     Essential Hypertriglyceridemia     Dyslipidemia     Migraine     Vitiligo       No past medical history on file.    Current Medications: Reviewed  No current outpatient prescriptions on file prior to visit.     No current facility-administered medications on file prior to visit.        Allergies: Reviewed   has No Known  "Allergies.    Social History:  Social History     Occupational History     Not on file.     Social History Main Topics     Smoking status: Never Smoker     Smokeless tobacco: Not on file     Alcohol use 3.6 oz/week     2 Glasses of wine, 4 Cans of beer per week      Comment: Moderate use     Drug use: No     Sexual activity: Yes     Partners: Female      Comment:  1996       Family History:   Family History   Problem Relation Age of Onset     Stroke Mother      PFO     Seizures Mother      Diabetes Father      Diabetes Paternal Grandmother          Review of Systems:  Complete head to toe review of systems is otherwise negative except as above.    Objective:    /74 (Patient Site: Left Arm, Patient Position: Sitting, Cuff Size: Adult Large)  Pulse 76  Ht 5' 11.5\" (1.816 m)  Wt 200 lb (90.7 kg)  BMI 27.51 kg/m2    GENERAL: Alert, cooperative, well-appearing male.   PSYCH: Pleasant mood, affect appropriate.    SKIN: Vitiligo lesions, many nevi   HEAD: Normocephalic, atraumatic  EYES: Conjunctiva pink, sclera white, no exudates. GUILLAUME.  EOMs intact. Corneal light reflex bilaterally, red reflex present. Undilated fundoscopic exam normal  EARS: TMs pearly grey, no bulging, redness, retraction.   NOSE: Nares patent, no discharge.  Normal nasal mucosa, septum and turbinates.  MOUTH: Pharynx moist, pink without exudate. No tonsillar enlargement  NECK: No lymphadenopathy. Thyroid borders smooth without enlargement, nodules.   CV: Regular rate and rhythm without murmurs, rubs or gallops.  RESP: Lung sounds clear, symmetric excursion.   ABDOMEN: BS+. Abdomen soft, nontender to palpation without guarding. No organomegaly  :  Large left varicocele.  Testes descended bilaterally without mass or hernia. Penis circumcised without lesions or discharge.  MSK: Spine and extremities in alignment.   PV : LEs warm, pink, symmetric hair distribution.       Assessment & Plan   1. Routine general medical examination at a " health care facility:  Fasting labs, will check thyroid with positive antibody history.  Declines PSA testing.  He will return with form for completion    2. Screening for diabetes mellitus  - Glycosylated Hemoglobin A1c    3. Thyroid antibody positive  - Thyroid Cascade    4. Essential Hypertriglyceridemia  - Lipid Cascade    5. Varicocele    Alcohol use, safety and moderation discussed.  Recommended adequate calcium intake/osteoporosis prevention.  Discussed colon cancer screening at age 50, 45 if -American.  Diet discuss, including moderation of portions sizes, avoiding eating out and fast food and increase in fruits and vegetables.  Discussed safe sex practices.  Discussed & recommended seat belt (& motorcycle helmet) use.      Donna Ng, CNP

## 2021-06-17 NOTE — PATIENT INSTRUCTIONS - HE
Patient Instructions by Jovany Rogel MD at 6/18/2019  7:40 AM     Author: Jovany Rogel MD Service: -- Author Type: Physician    Filed: 6/20/2019  8:49 AM Encounter Date: 6/18/2019 Status: Signed    : Jovany Rogel MD (Physician)       Patient Education     Prevention Guidelines, Men Ages 40 to 49  Screening tests and vaccines are an important part of managing your health. A screening test is done to find possible disorders or diseases in people who don't have any symptoms. The goal is to find a disease early so lifestyle changes can be made and you can be watched more closely to reduce the risk of disease, or to detect it early enough to treat it most effectively. Screening tests are not considered diagnostic, but are used to determine if more testing is needed. Health counseling is essential, too. Below are guidelines for these, for men ages 40 to 49. Talk with your healthcare provider to make sure youre up to date on what you need.  Screening Who needs it How often   Alcohol misuse All men in this age group At routine exams   Blood pressure All men in this age group Yearly checkup if your blood pressure reading is normal  Normal blood pressure is less than 120/80 mm Hg  If your blood pressure is higher than normal, follow the advice of your healthcare provider      Depression All men in this age group At routine exams   Type 2 diabetes or prediabetes All men beginning at age 45 and men  without symptoms at any age who are overweight or obese and have 1 or more other risk factors for diabetes At least every 3 years (yearly if blood sugar has begun to rise)   Type 2 diabetes All men with prediabetes Every year   Hepatitis C Men at increased risk for infection - talk with your healthcare provider At routine exams   High cholesterol or triglycerides All men ages 35 and older, and younger men at high risk for coronary artery disease At least every 5 years   HIV All men At  routine exams   Obesity All men in this age group At routine exams   Prostate cancer Starting at age 45, talk to healthcare provider about risks and benefits of digital rectal exam (JOSE ALEJANDRO) and prostate-specific antigen (PSA) screening1 At routine exams   Syphilis Men at increased risk for infection - talk with your healthcare provider At routine exams   Tuberculosis Men at increased risk for infection - talk with your healthcare provider Check with your healthcare provider   Vision All men in this age group Every 2 to 4 years if no risk factors for eye disease2   Vaccine Who needs it How often   Chickenpox (varicella) All men in this age group who have no record of this infection or vaccine 2 doses; the second dose should be given at least 4 weeks after the first dose   Hepatitis A Men at increased risk for infection - talk with your healthcare provider 2 doses given at least 6 months apart   Hepatitis B Men at increased risk for infection - talk with your healthcare provider 3 doses over 6 months; second dose should be given 1 month after the first dose; the third dose should be given at least 2 months after the second dose and at least 4 months after the first dose   Haemophilus influenzae Type B (HIB) Men at increased risk for infection - talk with your healthcare provider 1 to 3 doses   Influenza (flu) All men in this age group Once a year   Measles, mumps, rubella (MMR) All men in this age group who have no record of these infections or vaccines 1 or 2 doses   Meningococcal Men at increased risk for infection - talk with your healthcare provider 1 or more doses   Pneumococcal conjugate vaccine (PCV13) and pneumococcal polysaccharide vaccine (PPSV23) Men at increased risk for infection - talk with your healthcare provider PCV13: 1 dose ages 19 to 65 (protects against 13 types of pneumococcal bacteria)     PPSV23: 1 to 2 doses through age 64, or 1 dose at 65 or older (protects against 23 types of pneumococcal  bacteria)      Tetanus/diphtheria/  pertussis (Td/Tdap) booster All men in this age group Td every 10 years, or a one-time dose of Tdap instead of a Td booster after age 18, then Td every 10 years   Counseling Who needs it How often   Diet and exercise Men who are overweight or obese When diagnosed, and then at routine exams   Sexually transmitted infection prevention Men at increased risk for infection - talk with your healthcare provider At routine exams   Use of daily aspirin Men ages 45 to 79 at risk for cardiovascular health problems At routine exams   Use of tobacco and the health effects it can cause All men in this age group Every exam   [address] [place] [address] [facility]   [facility] [employer]  Date Last Reviewed: 2/1/2017 2000-2017 [employer]. [address] [place] [address], [place] [address]. All rights reserved. This information is not intended as a substitute for professional medical care. Always follow your healthcare professional's instructions.

## 2021-06-18 NOTE — PROGRESS NOTES
Assessment:      Healthy male exam.        Plan:      1.  Intermittent gastroesophageal reflux disease symptoms, consider EGD if not improving, will check H. pylori today.  Vitiligo, slight progression, continue to follow with dermatologist check thyroid testing today.  2. Patient Counseling:  --Nutrition: Stressed importance of moderation in sodium/caffeine intake, saturated fat and cholesterol, caloric balance, sufficient intake of fresh fruits, vegetables, fiber, calcium, iron.  --Discussed the issue of calcium supplement, and the daily use of baby aspirin.  --Exercise: Stressed the importance of regular exercise.   --Substance Abuse: Discussed cessation/primary prevention of tobacco, alcohol, or other drug use; driving or other dangerous activities under the influence; availability of treatment for abuse.    --Sexuality: Discussed sexually transmitted diseases, partner selection, use of condoms, avoidance of unintended pregnancy.  --Injury prevention: Discussed safety belts, safety helmets, smoke detector, smoking near bedding or upholstery.   --Dental health: Discussed importance of regular tooth brushing, flossing, and dental visits.  --Immunizations reviewed.  --Discussed timing and benefits of screening colonoscopy and alternative options.  --After hours service discussed with patient             -- I have had an Advance Directives discussion with the patient.  The following high BMI interventions were performed this visit: encouragement to exercise    3. Discussed the patient's BMI with him.  The BMI is above average; BMI management plan is completed  4. Follow up as needed for acute illness     Subjective:      Zaid Xie is a 42 y.o. male who presents for an annual exam. The patient reports that there is not domestic violence in his life.   Vitiligo has been managed by dermatologist did seems to progress lately.  Gastroesophageal reflux disease, intermittent.  Has not tried any specific treatment  other than Tums.  Insect bite in the lower extremities improving.  Healthy Habits:   Regular Exercise: Yes  Sunscreen Use: Yes  Healthy Diet: Yes  Dental Visits Regularly: Yes  Seat Belt: Yes  Sexually active: Yes  Monthly Self Testicular Exams:  Yes  Hemoccults: N/A  Flex Sig: N/A  Colonoscopy: No  Lipid Profile: Yes  Glucose Screen: Yes  Prevention of Osteoporosis: Yes  Last Dexa: N/A  Guns at Home:  N/A      Immunization History   Administered Date(s) Administered     Hep A, Adult IM (19yr & older) 11/21/2007, 05/21/2008     Hep A, historic 11/21/2007, 05/21/2008     Influenza, inj, historic,unspecified 11/21/2007     Influenza,seasonal, Inj IIV3 02/12/2007, 11/21/2007     Td, Adult, Absorbed 10/26/2006     Td, adult adsorbed, PF 06/20/2016     Td,adult,historic,unspecified 06/07/2006     Tdap 06/07/2006     Immunization status: stated as current, but no records available.  Vision Screening:both eyes  Hearing: PASS     Current Outpatient Prescriptions   Medication Sig Dispense Refill     ascorbic acid, vitamin C, (ASCORBIC ACID WITH WIN HIPS) 500 MG tablet Take 500 mg by mouth daily.       LUTEIN ORAL Take by mouth.       omega-3 fatty acids (FISH OIL) 500 mg cap Take by mouth.       No current facility-administered medications for this visit.      No past medical history on file.  Past Surgical History:   Procedure Laterality Date     [place] KNEE SCOPE,DIAGNOSTIC      Description: Arthroscopy Knee Right;  Recorded: 05/07/2013;     [place] REMOVAL OF SPERM DUCT(S)      Description: Surgery Of Male Genitalia Vasectomy;  Recorded: 05/07/2013;     Review of patient's allergies indicates no known allergies.  Family History   Problem Relation Age of Onset     Stroke Mother      PFO     Seizures Mother      Diabetes Father      Diabetes Paternal Grandmother      Social History     Social History     Marital status:      Spouse name: N/A     Number of children: N/A     Years of education: N/A     Occupational History      "Not on file.     Social History Main Topics     Smoking status: Never Smoker     Smokeless tobacco: Never Used     Alcohol use 3.6 oz/week     2 Glasses of wine, 4 Cans of beer per week      Comment: Moderate use     Drug use: No     Sexual activity: Yes     Partners: Female      Comment:  1996     Other Topics Concern     Not on file     Social History Narrative     No specialty comments available.  Review of Systems  General:  Denies problem  Eyes: Denies problem  Ears/Nose/Throat: Denies problem  Cardiovascular: Denies problem  Respiratory:  Denies problem  Gastrointestinal:  Denies problem  Genitourinary: Denies problem  Musculoskeletal:  Denies problem  Skin: Denies problem  Neurologic: Denies problem  Psychiatric: Denies problem  Endocrine: Denies problem  Heme/Lymphatic: Denies problem   Allergic/Immunologic: Denies problem        Objective:     Vitals:    06/27/18 0756   BP: 118/64   Pulse: 64   Resp: 13   Temp: 98.3  F (36.8  C)   TempSrc: Oral   Weight: 203 lb (92.1 kg)   Height: 5' 11.5\" (1.816 m)     Body mass index is 27.92 kg/(m^2).    Physical  General Appearance: Alert, cooperative, no distress, appears stated age  Head: Normocephalic, without obvious abnormality, atraumatic  Eyes: PERRL, conjunctiva/corneas clear, EOM's intact  Ears: Normal TM's and external ear canals, both ears  Nose: Nares normal, septum midline,mucosa normal, no drainage  Throat: Lips, mucosa, and tongue normal; teeth and gums normal  Neck: Supple, symmetrical, trachea midline, no adenopathy;  thyroid: not enlarged, symmetric, no tenderness/mass/nodules; no carotid bruit or JVD  Back: Symmetric, no curvature, ROM normal, no CVA tenderness  Lungs: Clear to auscultation bilaterally, respirations unlabored  Heart: Regular rate and rhythm, S1 and S2 normal, no murmur, rub, or gallop,  Abdomen: Soft, non-tender, bowel sounds active all four quadrants,  no masses, no organomegaly  Genitourinary: Penis normal. Right and left " testis are descended.No palpable masses.   Rectal: No visible external lesion  Musculoskeletal: Normal range of motion. No joint swelling or deformity.   Extremities: Extremities normal, atraumatic, no cyanosis or edema  Skin: Multiple symmetrical vitiligo lesions on neck, chest arm,otherwise skin color, texture, turgor normal, no rashes or lesions  Lymph nodes: Cervical, supraclavicular, and axillary nodes normal  Neurologic: He is alert. He has normal reflexes.   Psychiatric: He has a normal mood and affect.        MD Zaid Akers was seen today for annual exam, labs only, gastroesophageal reflux and insect bite.    Diagnoses and all orders for this visit:    Visit for preventive health examination    Gastroesophageal reflux disease without esophagitis  -     H. pylori Antigen, Stool; Future    Vitiligo  -     Thyroid Stimulating Hormone (TSH)  -     Thyroid Peroxidase Antibody    Screening for endocrine, nutritional, metabolic and immunity disorder  -     Glycosylated Hemoglobin A1c  -     Comprehensive Metabolic Panel  -     Lipid Cascade FASTING  -     Thyroid Stimulating Hormone (TSH)  -     Thyroid Peroxidase Antibody

## 2021-08-10 PROBLEM — E78.6 ABNORMALLY LOW HIGH DENSITY LIPOPROTEIN (HDL) CHOLESTEROL WITH HYPERTRIGLYCERIDEMIA: Status: ACTIVE | Noted: 2021-04-13

## 2021-08-10 PROBLEM — D17.30 LIPOMA OF SKIN AND SUBCUTANEOUS TISSUE: Status: RESOLVED | Noted: 2021-03-23 | Resolved: 2021-08-10

## 2021-08-11 ENCOUNTER — OFFICE VISIT (OUTPATIENT)
Dept: FAMILY MEDICINE | Facility: CLINIC | Age: 46
End: 2021-08-11
Payer: COMMERCIAL

## 2021-08-11 VITALS
DIASTOLIC BLOOD PRESSURE: 87 MMHG | RESPIRATION RATE: 16 BRPM | SYSTOLIC BLOOD PRESSURE: 131 MMHG | WEIGHT: 203 LBS | TEMPERATURE: 97.7 F | OXYGEN SATURATION: 99 % | HEIGHT: 72 IN | HEART RATE: 59 BPM | BODY MASS INDEX: 27.5 KG/M2

## 2021-08-11 DIAGNOSIS — D17.21 LIPOMA OF RIGHT FOREARM: ICD-10-CM

## 2021-08-11 DIAGNOSIS — Z00.00 HEALTH CARE MAINTENANCE: ICD-10-CM

## 2021-08-11 DIAGNOSIS — Z86.2 HISTORY OF DECREASED PLATELET COUNT: ICD-10-CM

## 2021-08-11 DIAGNOSIS — Z00.00 ROUTINE HISTORY AND PHYSICAL EXAMINATION OF ADULT: Primary | ICD-10-CM

## 2021-08-11 DIAGNOSIS — Z12.11 SCREEN FOR COLON CANCER: ICD-10-CM

## 2021-08-11 DIAGNOSIS — L80 VITILIGO: ICD-10-CM

## 2021-08-11 DIAGNOSIS — E78.1 ABNORMALLY LOW HIGH DENSITY LIPOPROTEIN (HDL) CHOLESTEROL WITH HYPERTRIGLYCERIDEMIA: ICD-10-CM

## 2021-08-11 DIAGNOSIS — E78.6 ABNORMALLY LOW HIGH DENSITY LIPOPROTEIN (HDL) CHOLESTEROL WITH HYPERTRIGLYCERIDEMIA: ICD-10-CM

## 2021-08-11 DIAGNOSIS — I86.1 VARICOCELE: ICD-10-CM

## 2021-08-11 DIAGNOSIS — Z78.9 VEGETARIAN: ICD-10-CM

## 2021-08-11 DIAGNOSIS — D72.819 LEUKOPENIA, UNSPECIFIED TYPE: ICD-10-CM

## 2021-08-11 DIAGNOSIS — Z13.21 ENCOUNTER FOR VITAMIN DEFICIENCY SCREENING: ICD-10-CM

## 2021-08-11 PROBLEM — M54.12 CERVICAL RADICULITIS: Status: RESOLVED | Noted: 2021-02-15 | Resolved: 2021-08-11

## 2021-08-11 LAB
BASOPHILS # BLD AUTO: 0 10E3/UL (ref 0–0.2)
BASOPHILS NFR BLD AUTO: 1 %
DEPRECATED CALCIDIOL+CALCIFEROL SERPL-MC: 29 UG/L (ref 20–75)
EOSINOPHIL # BLD AUTO: 0.3 10E3/UL (ref 0–0.7)
EOSINOPHIL NFR BLD AUTO: 6 %
ERYTHROCYTE [DISTWIDTH] IN BLOOD BY AUTOMATED COUNT: 11.9 % (ref 10–15)
FERRITIN SERPL-MCNC: 146 NG/ML (ref 26–388)
HCT VFR BLD AUTO: 43.5 % (ref 40–53)
HGB BLD-MCNC: 15.1 G/DL (ref 13.3–17.7)
IMM GRANULOCYTES # BLD: 0 10E3/UL
IMM GRANULOCYTES NFR BLD: 0 %
IRON SATN MFR SERPL: 34 % (ref 15–46)
IRON SERPL-MCNC: 101 UG/DL (ref 35–180)
LYMPHOCYTES # BLD AUTO: 1.2 10E3/UL (ref 0.8–5.3)
LYMPHOCYTES NFR BLD AUTO: 31 %
MCH RBC QN AUTO: 30.7 PG (ref 26.5–33)
MCHC RBC AUTO-ENTMCNC: 34.7 G/DL (ref 31.5–36.5)
MCV RBC AUTO: 88 FL (ref 78–100)
MONOCYTES # BLD AUTO: 0.4 10E3/UL (ref 0–1.3)
MONOCYTES NFR BLD AUTO: 10 %
NEUTROPHILS # BLD AUTO: 2.1 10E3/UL (ref 1.6–8.3)
NEUTROPHILS NFR BLD AUTO: 53 %
PLATELET # BLD AUTO: 133 10E3/UL (ref 150–450)
RBC # BLD AUTO: 4.92 10E6/UL (ref 4.4–5.9)
TIBC SERPL-MCNC: 301 UG/DL (ref 240–430)
VIT B12 SERPL-MCNC: 536 PG/ML (ref 193–986)
WBC # BLD AUTO: 3.9 10E3/UL (ref 4–11)

## 2021-08-11 PROCEDURE — 85025 COMPLETE CBC W/AUTO DIFF WBC: CPT | Performed by: FAMILY MEDICINE

## 2021-08-11 PROCEDURE — 82728 ASSAY OF FERRITIN: CPT | Performed by: FAMILY MEDICINE

## 2021-08-11 PROCEDURE — 83550 IRON BINDING TEST: CPT | Performed by: FAMILY MEDICINE

## 2021-08-11 PROCEDURE — 99396 PREV VISIT EST AGE 40-64: CPT | Performed by: FAMILY MEDICINE

## 2021-08-11 PROCEDURE — 36415 COLL VENOUS BLD VENIPUNCTURE: CPT | Performed by: FAMILY MEDICINE

## 2021-08-11 PROCEDURE — 82607 VITAMIN B-12: CPT | Performed by: FAMILY MEDICINE

## 2021-08-11 PROCEDURE — 82306 VITAMIN D 25 HYDROXY: CPT | Performed by: FAMILY MEDICINE

## 2021-08-11 ASSESSMENT — ENCOUNTER SYMPTOMS
DYSURIA: 0
DIZZINESS: 0
FEVER: 0
PARESTHESIAS: 0
DIARRHEA: 0
CHILLS: 0
SORE THROAT: 0
NAUSEA: 0
JOINT SWELLING: 0
HEARTBURN: 0
ABDOMINAL PAIN: 0
SHORTNESS OF BREATH: 0
EYE PAIN: 0
ARTHRALGIAS: 0
HEMATOCHEZIA: 0
WEAKNESS: 0
MYALGIAS: 0
HEMATURIA: 0
FREQUENCY: 0
HEADACHES: 0
CONSTIPATION: 0
COUGH: 0
PALPITATIONS: 0
NERVOUS/ANXIOUS: 0

## 2021-08-11 ASSESSMENT — MIFFLIN-ST. JEOR: SCORE: 1833.8

## 2021-08-11 NOTE — PATIENT INSTRUCTIONS
Seen for preventive health and additional concerns today   Self testicular check regularly   Labs today and will make further recommendations once reviewed  Will defer checking labs done in April   Recheck HBA1c, cmp, lipids next year  If neck and arm left side bother more despite stretching can contact us about referral to ortho neck etc  Follow up with derm for skin check  & vitiligo  Follow up with the dentist  Colon cancer screeming , colonoscopy ordered  Return to clinic in 1 year    Preventive Health Recommendations  Male Ages 40 to 49  Yearly exam:             See your health care provider every year in order to  o   Review health changes.   o   Discuss preventive care.    o   Review your medicines if your doctor has prescribed any.    You should be tested each year for STDs (sexually transmitted diseases) if you re at risk.     Have a cholesterol test every 5 years.     Have a colonoscopy (test for colon cancer) if someone in your family has had colon cancer or polyps before age 50.     After age 45, have a diabetes test (fasting glucose). If you are at risk for diabetes, you should have this test every 3 years.      Talk with your health care provider about whether or not a prostate cancer screening test (PSA) is right for you.    Shots: Get a flu shot each year. Get a tetanus shot every 10 years.     Nutrition:    Eat at least 5 servings of fruits and vegetables daily.     Eat whole-grain bread, whole-wheat pasta and brown rice instead of white grains and rice.     Get adequate Calcium and Vitamin D.     Lifestyle    Exercise for at least 150 minutes a week (30 minutes a day, 5 days a week). This will help you control your weight and prevent disease.     Limit alcohol to one drink per day.     No smoking.     Wear sunscreen to prevent skin cancer.     See your dentist every six months for an exam and cleaning.

## 2021-08-11 NOTE — RESULT ENCOUNTER NOTE
Kuldeep Mr. Xie,  Some of your results came back and white count stable low at 3.9 and platelets stable low at 133. This has been chronic . Unknown cause. If dropping evidenced by fatigue, bleeding, bruising or remain concerned and would like more answers I can refer you to a hematologist but looks like this has been your baseline a long time. If you have any further concerns please do not hesitate to contact us by message, phone or making an appointment.  Have a good day   Dr Haresh GUERRERO

## 2021-08-11 NOTE — PROGRESS NOTES
SUBJECTIVE:   CC: Zaid Xie is an 45 year old male who presents for preventative health visit.     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 U/S done 2/15 for large subcutaneous mass left lateral upper back showed a lipoma, referred to the surgeon, seen 3/22 and noted to get surgically removed under local anesthesia.  Seen first time on 4/14/2021 by this provider for a prep for lipoma excision under local. Camp physical & preventive labs done. Addressed hx of impa ired glucose, elevate dT Gin past, BMI, vitiligo hx and labs done showed glucose of 104, HBA1c was normal at 5.5, negative for Hep C, HIV, normal TSH, Low HDL, LDL & TG were normal, Rest of BMP normal. CBC showed stable low wbc and platelets of 145 previously 115. Reminded to work on advance directives. Was to continue routine care & preventive physical with PCP Dr Marino. Had lipoma excision left back on 4/26/21. MN  oxycodone 5 mg # 10 on  4/26/21    CURRENTLY   Here for a preventive physical  Recovered from lipoma excision  Nerve pain prior to that resolved 80 % after surgery  Left neck Pinched nerve better doing some strength training, stretching helps, no need to see ortho yet. Completed pt around same time had excision of lipoma and strangely noted improvement after lipoma excision  Vegetarian since feb, eliminated 80 percent red meat, eating some fish and chicken  BMI down from 29 to 27  Vitiligo unchanged, need to schedule a derm apt, seen in past had UVB Rx. Maybe 4 to 5 yrs ago  Prior atypical nevi on Bx  Hx of varicocele not bothering him, dx in highschool, dad of three  Lipoma right forearm not bothering him, unchanged  Everyone in family vaccinated against covid  Hx of vit d def in past, takes in winter time  Health care maintenance reviewed    Today's PHQ-2 Score:   PHQ-2 ( 1999 Pfizer) 8/11/2021   Q1: Little interest or pleasure in doing things 0   Q2: Feeling down, depressed or hopeless 0   PHQ-2 Score 0   Q1: Little interest or pleasure in doing things Not at all   Q2: Feeling down, depressed or hopeless Not at all   PHQ-2 Score 0       Abuse: Current or Past(Physical, Sexual or Emotional)- No  Do you feel safe in your environment? Yes        Social History     Tobacco Use     Smoking status: Never Smoker     Smokeless tobacco: Never Used   Substance Use Topics     Alcohol use: Yes     Comment: 5 dirnks a week      If you drink alcohol do you typically have >3 drinks per day or >7 drinks per week? No    Alcohol Use 8/11/2021   Prescreen: >3 drinks/day or >7 drinks/week? No   Prescreen: >3 drinks/day or >7 drinks/week? -   No flowsheet data found.    Last PSA: No results found for: PSA    Reviewed orders with patient. Reviewed health maintenance and updated orders accordingly - Yes  Lab work is in process  Labs reviewed in EPIC  BP Readings from Last 3 Encounters:   08/11/21 131/87   04/26/21 (!) 130/95   04/14/21 122/76    Wt Readings  from Last 3 Encounters:   08/11/21 92.1 kg (203 lb)   04/26/21 94.8 kg (209 lb)   04/14/21 93.9 kg (207 lb)                  Patient Active Problem List   Diagnosis     Abnormally low high density lipoprotein (HDL) cholesterol with hypertriglyceridemia     Varicocele     Vitiligo     BMI 27.0-27.9,adult     History of decreased platelet count     Leukopenia, unspecified type     Past Surgical History:   Procedure Laterality Date     ARTHROSCOPY KNEE       EXCISE LESION BACK Left 4/26/2021    Procedure: EXCISION, LESION, BACK;  Surgeon: Daniel Barbosa MD;  [place] [facility] [place]EE SCOPE, DIAGNOSTIC      Description: Arthroscopy Knee Right;  Recorded: 05/07/2013;     REMOVAL OF SPERM DUCT(S)      Description: Surgery Of Male Genitalia Vasectomy;  Recorded: 05/07/2013;     VASECTOMY         Social History     Tobacco Use     Smoking status: Never Smoker     Smokeless tobacco: Never Used   Substance Use Topics     Alcohol use: Yes     Comment: 5 dirnks a week      Family History   Problem Relation Age of Onset     Hypertension Mother      Cerebrovascular Disease Mother         PFO     Diabetes Father      Seizure Disorder Mother      Diabetes Paternal Grandmother          Current Outpatient Medications   Medication Sig Dispense Refill     cholecalciferol (VITAMIN D3) 125 mcg (5000 units) capsule        LUTEIN PO        OMEGA-3 FATTY ACIDS PO        vitamin B complex with vitamin C (VITAMIN  B COMPLEX) tablet        vitamin C (ASCORBIC ACID) 500 MG tablet Take 500 mg by mouth       Allergies   Allergen Reactions     Nsaids Other (See Comments)     ulcer     Recent Labs   Lab Test 04/14/21  0855 06/18/19  0826 06/27/18  0837   A1C 5.6 5.6 5.6   LDL 91 94 111   HDL 37* 31* 31*   TRIG 141 150* 192*   ALT 63 27 78*   CR 0.87 0.91 0.85   GFRESTIMATED >90 >60 >60   GFRESTBLACK >90 >60 >60   POTASSIUM 4.3 4.6 4.7   TSH 1.87 2.39 2.37        Reviewed and updated as needed this visit by clinical staff  Tobacco   Allergies  Meds  Problems  Med Hx  Surg Hx  Fam Hx  Soc Hx          Reviewed and updated as needed this visit by Provider  Tobacco  Allergies  Meds  Problems  Med Hx  Surg Hx  Fam Hx  Soc Hx         Past Medical History:   Diagnosis Date     Cervical radiculitis 2/15/2021     Esophageal reflux 04/13/2021     Lipoma of skin and subcutaneous tissue 3/23/2021    Added automatically from request for surgery [id number]     Migraine 06/20/2016    Formatting of this note might be different from the original. Related to dehydration.     Vitiligo       Past Surgical History:   Procedure Laterality Date     ARTHROSCOPY KNEE       EXCISE LESION BACK Left 4/26/2021    Procedure: EXCISION, LESION, BACK;  Surgeon: Daniel Barbosa MD;  Location: [facility] [place]      KNEE SCOPE, DIAGNOSTIC      Description: Arthroscopy Knee Right;  Recorded: 05/07/2013;     REMOVAL OF SPERM DUCT(S)      Description: Surgery Of Male Genitalia Vasectomy;  Recorded: 05/07/2013;     VASECTOMY         Review of Systems   Constitutional: Negative for chills and fever.   HENT: Negative for congestion, ear pain, hearing loss and sore throat.    Eyes: Negative for pain and visual disturbance.   Respiratory: Negative for cough and shortness of breath.    Cardiovascular: Negative for chest pain, palpitations and peripheral edema.   Gastrointestinal: Negative for abdominal pain, constipation, diarrhea, heartburn, hematochezia and nausea.   Genitourinary: Negative for dysuria, frequency, genital sores, hematuria and urgency.   Musculoskeletal: Negative for arthralgias, joint swelling and myalgias.   Skin: Negative for rash.   Neurological: Negative for dizziness, weakness, headaches and paresthesias.   Psychiatric/Behavioral: Negative for mood changes. The patient is not nervous/anxious.      OBJECTIVE:   /87 (BP Location: Right arm, Patient Position: Chair, Cuff Size: Adult Regular)   Pulse 59   Temp 97.7  F (36.5  C) (Tympanic)   Resp 16    Ht 1.829 m (6')   Wt 92.1 kg (203 lb)   SpO2 99%   BMI 27.53 kg/m      Physical Exam  GENERAL: healthy, alert, no distress and over weight  EYES: Eyes grossly normal to inspection, PERRL and conjunctivae and sclerae normal  HENT: ear canals and TM's normal, nose and mouth without ulcers or lesions  NECK: no adenopathy, no asymmetry, masses, or scars and thyroid normal to palpation  RESP: lungs clear to auscultation - no rales, rhonchi or wheezes  CV: regular rate and rhythm, normal S1 S2, no S3 or S4, no murmur, click or rub, no peripheral edema and peripheral pulses strong  ABDOMEN: soft, non tender, no hepatosplenomegaly, no masses and bowel sounds normal   (male): normal male genitalia without lesions or urethral discharge, no hernia, circumcised, B/l varicoceles L > R  MS: no gross musculoskeletal defects noted, no edema  SKIN: no suspicious lesions or rashes, multiple pigmented nevi, scar left chest wall side where had excision, 2 cm lipoma right mid forearm  NEURO: Normal strength and tone, mentation intact and speech normal  PSYCH: mentation appears normal, affect normal/bright    Diagnostic Test Results:  Labs reviewed in Epic  Results for orders placed or performed in visit on 08/11/21 (from the past 24 hour(s))   CBC with platelets and differential    Narrative    The following orders were created for panel order CBC with platelets and differential.  Procedure                               Abnormality         Status                     ---------                               -----------         ------                     CBC with platelets and d...[[id number]]                                                   Please view results for these tests on the individual orders.       ASSESSMENT/PLAN:       ICD-10-CM    1. Routine history and physical examination of adult  Z00.00 Vitamin B12     Iron and iron binding capacity     Ferritin     Vitamin D Deficiency     Vitamin B12     Iron and iron binding  capacity     Ferritin     Vitamin D Deficiency     Adult Gastro Ref - Procedure Only   2. Abnormally low high density lipoprotein (HDL) cholesterol with hypertriglyceridemia  E78.6     E78.1    3. BMI 27.0-27.9,adult  Z68.27    4. Vitiligo  L80    5. Varicocele  I86.1    6. Lipoma of right forearm  D17.21    7. History of decreased platelet count  Z86.2 CBC with platelets and differential     CBC with platelets and differential   8. Leukopenia, unspecified type  D72.819 CBC with platelets and differential     Vitamin B12     Iron and iron binding capacity     Ferritin     CBC with platelets and differential     Vitamin B12     Iron and iron binding capacity     Ferritin   9. Vegetarian  Z78.9 Vitamin B12     Iron and iron binding capacity     Ferritin     Vitamin D Deficiency     Vitamin B12     Iron and iron binding capacity     Ferritin     Vitamin D Deficiency   10. Encounter for vitamin deficiency screening  Z13.21 Vitamin D Deficiency     Vitamin D Deficiency   11. Health care maintenance  Z00.00 REVIEW OF HEALTH MAINTENANCE PROTOCOL ORDERS   12. Screen for colon cancer  Z12.11 Adult Gastro Ref - Procedure Only     Seen for preventive health and additional concerns today   Self testicular check regularly   Labs today and will make further recommendations once reviewed  Will defer checking labs done in April . Check cbc today given low wbc & platelets in the past  Recheck HBA1c, CMP, lipids next year    Recovered from lipoma excision  Nerve pain prior to that resolved 80 % after surgery  Left neck Pinched nerve better doing some strength training, stretching helps, no need to see ortho yet. Completed pt around same time had excision of lipoma and strangely noted improvement after lipoma excision  If neck and arm left side bothering  more despite stretching can contact us about referral to ortho neck etc    Vegetarian since feb, eliminated 80 percent red meat, eating some fish and chicken. Check cbc, iron,  ferritin, b12 & vit d.   BMI down from 29 to 27  Vitiligo unchanged, need to schedule a derm apt, seen in past had UVB Rx. Maybe 4 to 5 yrs ago. Follow up with derm for skin check  & vitiligo  Prior atypical nevi on Bx  Hx of varicocele not bothering him, dx in high school, dad of three  Lipoma right forearm not bothering him, unchanged. Will monitor.   Everyone in family vaccinated against covid  Hx of vit d def in past, takes in winter time. Will check today.   Health care maintenance reviewed    Follow up with the dentist  Colon cancer screening , colonoscopy ordered  Return to clinic in 1 year for a preventive physical or sooner prn     Patient has been advised of split billing requirements and indicates understanding: Yes  COUNSELING:   Reviewed preventive health counseling, as reflected in patient instructions       Regular exercise       Healthy diet/nutrition       Vision screening       Immunizations       Alcohol Use        Family planning       Colon cancer screening       The 10-year ASCVD risk score (Yarely CHEN Jr., et al., 2013) is: 2.5%    Values used to calculate the score:      Age: 45 years      Sex: Male      Is Non- : No      Diabetic: No      Tobacco smoker: No      Systolic Blood Pressure: 131 mmHg      Is BP treated: No      HDL Cholesterol: 37 mg/dL      Total Cholesterol: 156 mg/dL       Advance Care Planning    Estimated body mass index is 27.53 kg/m  as calculated from the following:    Height as of this encounter: 1.829 m (6').    Weight as of this encounter: 92.1 kg (203 lb).     Weight management plan: Discussed healthy diet and exercise guidelines    He reports that he has never smoked. He has never used smokeless tobacco.      Counseling Resources:  ATP IV Guidelines  Pooled Cohorts Equation Calculator  FRAX Risk Assessment  ICSI Preventive Guidelines  Dietary Guidelines for Americans, 2010  USDA's MyPlate  ASA Prophylaxis  Lung CA Screening    MD CHIO Luo  [facility]

## 2021-08-12 NOTE — RESULT ENCOUNTER NOTE
Kuldeep Mr. Xie,  Your results came back and are within acceptable limits. -Vitamin D level is low normal and getting 2000 IU daily in your diet or supplements is recommended.   -Ferritin (iron) level is normal. Vit b 12 is wnl. If you have any further concerns please do not hesitate to contact us by message, phone or making an appointment.  Have a good day   Dr Haresh GUERRERO

## 2021-10-11 ENCOUNTER — HEALTH MAINTENANCE LETTER (OUTPATIENT)
Age: 46
End: 2021-10-11

## 2021-11-02 ENCOUNTER — TELEPHONE (OUTPATIENT)
Dept: GASTROENTEROLOGY | Facility: CLINIC | Age: 46
End: 2021-11-02

## 2021-11-02 DIAGNOSIS — Z11.59 ENCOUNTER FOR SCREENING FOR OTHER VIRAL DISEASES: ICD-10-CM

## 2021-11-02 NOTE — TELEPHONE ENCOUNTER
Screening Questions  1. Are you active on mychart? Yes    2. What insurance is in the chart? [employer]    2.  Ordering/Referring Provider: Izzy Hutson MD     3. BMI 28.0    4. Do you have any Lung issues?  N If yes continue:   Do you use daily home oxygen?    Do you have Pulmonary Hypertension?    Do you have SEVERE asthma?     5. Have you had a heart, lung, or liver transplant? N    6. Are you currently on dialysis or have chronic kidney disease? N    7. Have you had a stroke or Transient ischemic attack (TIA) within 6 months? N    8. In the past 6 months, have you had any heart related issues including cardiomyopathy or heart attack? N      If yes, did it require cardiac stenting or other implantable device?      9. Do you have any implantable devices in your body (pacemaker, defib, LVAD)? N    10. Do you take nitroglycerin? If yes, how often? N    11. Are you currently taking any blood thinners?N    12. Are you a diabetic? N    13. (Females) Are you currently pregnant?   If yes, how many weeks?      15. Are you taking any prescription pain medications on a routine schedule? N If yes, MAC sedation.    16. Do you have any chemical dependencies such as alcohol, street drugs, or methadone? N If yes, MAC sedation.    17. Do you have any history of post-traumatic stress syndrome, severe anxiety or history of psychosis? N    18. Do you transfer independently? Y    19.  Do you have any issues with constipation? N    20. Preferred Pharmacy for Pre Prescription [id number] - [place], [place] - [address] [place], [place] [address]e S    Scheduling Details    Which Colonoscopy Prep was Sent?: Miralax  Procedure Scheduled: Colonoscopy  Provider/Surgeon: Nader  Date of Procedure: 11-29  Location: [facility]  Caller (Please ask for phone number if not scheduled by patient): karl      Sedation Type: MAC  Conscious Sedation- Needs  for 6 hours after the procedure  MAC/General-Needs  for 24 hours after procedure    Pre-op  Required at [facility], [facility] and OR for MAC sedation:   (if yes advise patient they will need a pre-op prior to procedure)      Is patient on blood thinners? -N (If yes- inform patient to follow up with PCP or provider for follow up instructions)     Informed patient they will need an adult  Yes  Cannot take any type of public or medical transportation alone    Pre-Procedure Covid test to be completed at [facility] or Externally: Yes - [facility] 11-27    Confirmed Nurse will call to complete assessment Yes    Additional comments:

## 2021-11-17 ENCOUNTER — TELEPHONE (OUTPATIENT)
Dept: GASTROENTEROLOGY | Facility: CLINIC | Age: 46
End: 2021-11-17
Payer: COMMERCIAL

## 2021-11-17 NOTE — TELEPHONE ENCOUNTER
Patient scheduled for colonoscopy on 11.29.2021.     Covid test scheduled: 11.27.21    Arrival time: 0700    Facility location: [facility]    Sedation type: MAC    Indication for procedure: screening    Referring provider: Izzy Hutson MD    Bowel prep recommendation: miralax and magnesium citrate prep    Pre visit planning completed.    Kateryna Wilkerson RN

## 2021-11-17 NOTE — TELEPHONE ENCOUNTER
Attempted to contact patient regarding upcoming colonoscopy procedure on 11.29.2021 for pre assessment questions. No answer.     Left message to return call to [phone] #2    Kateryna Wilkerson RN

## 2021-11-22 ENCOUNTER — MYC MEDICAL ADVICE (OUTPATIENT)
Dept: FAMILY MEDICINE | Facility: CLINIC | Age: 46
End: 2021-11-22
Payer: COMMERCIAL

## 2021-11-22 NOTE — TELEPHONE ENCOUNTER
Pre assessment questions completed for upcoming colonoscopy procedure scheduled on 11.29.2021    COVID test scheduled 11.27.2021    Reviewed procedural arrival time 0700 and facility location [facility].    Designated  policy reviewed. Instructed to have someone stay 24 hours post procedure.     Anticoagulation/blood thinners? no    Electronic implanted devices? no    Reviewed miralax and magnesium citrate prep instructions with patient. No fiber/iron supplements or foods that contain nuts/seeds 7 days prior to procedure.     Patient verbalized understanding and had no questions or concerns at this time.    Kateryna Wilkerson RN

## 2021-11-27 ENCOUNTER — LAB (OUTPATIENT)
Dept: LAB | Facility: CLINIC | Age: 46
End: 2021-11-27
Payer: COMMERCIAL

## 2021-11-27 DIAGNOSIS — Z11.59 ENCOUNTER FOR SCREENING FOR OTHER VIRAL DISEASES: ICD-10-CM

## 2021-11-27 LAB — SARS-COV-2 RNA RESP QL NAA+PROBE: NEGATIVE

## 2021-11-27 PROCEDURE — 99000 SPECIMEN HANDLING OFFICE-LAB: CPT | Performed by: PATHOLOGY

## 2021-11-27 PROCEDURE — U0005 INFEC AGEN DETEC AMPLI PROBE: HCPCS | Mod: 90 | Performed by: PATHOLOGY

## 2021-11-27 PROCEDURE — U0003 INFECTIOUS AGENT DETECTION BY NUCLEIC ACID (DNA OR RNA); SEVERE ACUTE RESPIRATORY SYNDROME CORONAVIRUS 2 (SARS-COV-2) (CORONAVIRUS DISEASE [COVID-19]), AMPLIFIED PROBE TECHNIQUE, MAKING USE OF HIGH THROUGHPUT TECHNOLOGIES AS DESCRIBED BY CMS-2020-01-R: HCPCS | Mod: 90 | Performed by: PATHOLOGY

## 2021-11-29 ENCOUNTER — TRANSFERRED RECORDS (OUTPATIENT)
Dept: HEALTH INFORMATION MANAGEMENT | Facility: CLINIC | Age: 46
End: 2021-11-29
Payer: COMMERCIAL

## 2021-11-29 ENCOUNTER — DOCUMENTATION ONLY (OUTPATIENT)
Dept: GASTROENTEROLOGY | Facility: OUTPATIENT CENTER | Age: 46
End: 2021-11-29
Payer: COMMERCIAL

## 2021-11-29 NOTE — TELEPHONE ENCOUNTER
Dr. Marino- review and advise re: testosterone level concerns.       Future lab order pended.     KATHERYN RuedaN RN  [facility]

## 2021-11-30 NOTE — TELEPHONE ENCOUNTER
This is not something we usually check and insurance may not cover it. While I can order it you might have to pay out of pocket  Also if its out of range the next step would be to visit with either endocrine or urology to decide what to do next as its not my area of expertise  testosterone supp can cause HTN, HLD, weight gain , mood changes etc

## 2021-12-01 NOTE — TELEPHONE ENCOUNTER
Writer responded via [employer].    KATHERYN JonesN, RN  [facility]

## 2021-12-01 NOTE — TELEPHONE ENCOUNTER
Writer responded via [employer].    KATHERYN JonesN, RN  [facility]

## 2021-12-08 ENCOUNTER — LAB (OUTPATIENT)
Dept: LAB | Facility: CLINIC | Age: 46
End: 2021-12-08
Payer: COMMERCIAL

## 2021-12-08 PROCEDURE — 84403 ASSAY OF TOTAL TESTOSTERONE: CPT

## 2021-12-08 PROCEDURE — 36415 COLL VENOUS BLD VENIPUNCTURE: CPT

## 2021-12-10 LAB — TESTOST SERPL-MCNC: 452 NG/DL (ref 240–950)

## 2021-12-10 NOTE — RESULT ENCOUNTER NOTE
Kuldeep Mr. Xie,  Your results came back with a normal testosterone level.  If you have any further concerns please do not hesitate to contact us by message, phone or making an appointment.  Have a good day   Dr Haresh GUERRERO

## 2022-01-21 ENCOUNTER — MYC MEDICAL ADVICE (OUTPATIENT)
Dept: FAMILY MEDICINE | Facility: CLINIC | Age: 47
End: 2022-01-21
Payer: COMMERCIAL

## 2022-02-09 ENCOUNTER — MYC MEDICAL ADVICE (OUTPATIENT)
Dept: FAMILY MEDICINE | Facility: CLINIC | Age: 47
End: 2022-02-09
Payer: COMMERCIAL

## 2022-02-09 DIAGNOSIS — M54.12 CERVICAL RADICULOPATHY: Primary | ICD-10-CM

## 2022-02-09 DIAGNOSIS — R20.2 NUMBNESS AND TINGLING IN RIGHT HAND: ICD-10-CM

## 2022-02-09 DIAGNOSIS — R20.0 NUMBNESS AND TINGLING IN RIGHT HAND: ICD-10-CM

## 2022-02-10 ENCOUNTER — TELEPHONE (OUTPATIENT)
Dept: NEUROSURGERY | Facility: CLINIC | Age: 47
End: 2022-02-10
Payer: COMMERCIAL

## 2022-02-10 NOTE — TELEPHONE ENCOUNTER
Tried connection with patient to schedule with [facility] for a work up,  No voicemail capability at this time.

## 2022-02-14 ENCOUNTER — OFFICE VISIT (OUTPATIENT)
Dept: NEUROSURGERY | Facility: CLINIC | Age: 47
End: 2022-02-14
Attending: FAMILY MEDICINE
Payer: COMMERCIAL

## 2022-02-14 VITALS
WEIGHT: 203 LBS | SYSTOLIC BLOOD PRESSURE: 140 MMHG | DIASTOLIC BLOOD PRESSURE: 80 MMHG | BODY MASS INDEX: 27.5 KG/M2 | HEART RATE: 72 BPM | RESPIRATION RATE: 16 BRPM | HEIGHT: 72 IN | OXYGEN SATURATION: 98 %

## 2022-02-14 DIAGNOSIS — R20.0 NUMBNESS AND TINGLING IN RIGHT HAND: ICD-10-CM

## 2022-02-14 DIAGNOSIS — R20.2 NUMBNESS AND TINGLING IN RIGHT HAND: ICD-10-CM

## 2022-02-14 DIAGNOSIS — M54.12 CERVICAL RADICULOPATHY: ICD-10-CM

## 2022-02-14 PROCEDURE — 99203 OFFICE O/P NEW LOW 30 MIN: CPT | Performed by: PHYSICIAN ASSISTANT

## 2022-02-14 ASSESSMENT — MIFFLIN-ST. JEOR: SCORE: 1828.8

## 2022-02-14 ASSESSMENT — PAIN SCALES - GENERAL: PAINLEVEL: MILD PAIN (3)

## 2022-02-14 NOTE — PROGRESS NOTES
Neurosurgery Consult    HPI    Mr. Xie is a 38-year-old male presents to clinic for evaluation of right hand tingling which is intermittent.  Last year he had similar symptoms in his left hand which resolved spontaneously around the same time that he had a lipoma removed from his back on the left side.  The timing of these seems to be coincidental.  He denies loss of dexterity in his fingers loss of coordination his feet denies Lhermitte's phenomenon.  He denies weakness in his right dominant hand.  He had tried physical therapy previously with some moderate improvement.  He has not tried cervical traction.  Patient also reports intermittent arm pain on the right as well.    Medical history  Vitiligo      Social history  Patient is an optometrist      B/P: 140/80, T: Data Unavailable, P: 72, R: 16       Exam    Alert and oriented no acute distress  Bilateral upper extremities with 5/5 strength  Xavier sign negative  Reflexes 2+ triceps, biceps, brachioradialis    Bilateral lower extremities with 5/5 strength  Reflexes 2+ patella/ankle  Negative straight leg raise bilaterally  Negative ankle clonus negative Babinski bilaterally  Gait is normal    Positive Phalen's in the right wrist, negative on the left, negative Tinel's in the bilateral elbows and wrists    Imaging    No imaging to review    Assessment    Intermittent right hand numbness and tingling  Right arm intermittent pain    Plan:      I would recommend the patient obtain a cervical MRI without contrast to evaluate for cervical radiculopathy.  If this is negative we might consider evaluation for carpal tunnel syndrome.  He may benefit from cervical traction depending on how his cervical MRI results turn out.    Addendum  3/8/2022    Cervical MRI was reviewed and demonstrates bilateral foraminal narrowing which is moderate to severe at C6-7.  I recommend any with physical therapy for trial of cervical traction and follow-up with us as needed if is not  improving.    Total time of 30 minutes spent with the patient today in counseling and coordination of care.

## 2022-02-14 NOTE — NURSING NOTE
Zaid Xie is a 46 year old male who presents for:  Chief Complaint   Patient presents with     Consult     Cervical         Initial Vitals:  BP (!) 140/80   Pulse 72   Resp 16   Ht 6' (1.829 m)   Wt 203 lb (92.1 kg)   SpO2 98%   BMI 27.53 kg/m   Estimated body mass index is 27.53 kg/m  as calculated from the following:    Height as of this encounter: 6' (1.829 m).    Weight as of this encounter: 203 lb (92.1 kg).. Body surface area is 2.16 meters squared. BP completed using cuff size: regular  Mild Pain (3)    Nursing Comments: Patient had this problem last year on the left and it is now on the right side. He has numbness and tingling with minimal pain that is mainly positional.    Aline Choudhury, CMA

## 2022-02-14 NOTE — LETTER
2/14/2022         RE: Zaid Xie  [address]  [place] [address]        Dear Colleague,    Thank you for referring your patient, Zaid Xie, to the [facility]. Please see a copy of my visit note below.    Neurosurgery Consult    HPI    Mr. Xie is a 38-year-old male presents to clinic for evaluation of right hand tingling which is intermittent.  Last year he had similar symptoms in his left hand which resolved spontaneously around the same time that he had a lipoma removed from his back on the left side.  The timing of these seems to be coincidental.  He denies loss of dexterity in his fingers loss of coordination his feet denies Lhermitte's phenomenon.  He denies weakness in his right dominant hand.  He had tried physical therapy previously with some moderate improvement.  He has not tried cervical traction.  Patient also reports intermittent arm pain on the right as well.    Medical history  Vitiligo      Social history  Patient is an optometrist      B/P: 140/80, T: Data Unavailable, P: 72, R: 16       Exam    Alert and oriented no acute distress  Bilateral upper extremities with 5/5 strength  Xavier sign negative  Reflexes 2+ triceps, biceps, brachioradialis    Bilateral lower extremities with 5/5 strength  Reflexes 2+ patella/ankle  Negative straight leg raise bilaterally  Negative ankle clonus negative Babinski bilaterally  Gait is normal    Positive Phalen's in the right wrist, negative on the left, negative Tinel's in the bilateral elbows and wrists    Imaging    No imaging to review    Assessment    Intermittent right hand numbness and tingling  Right arm intermittent pain    Plan:      I would recommend the patient obtain a cervical MRI without contrast to evaluate for cervical radiculopathy.  If this is negative we might consider evaluation for carpal tunnel syndrome.  He may benefit from cervical traction depending on how his cervical MRI results  turn out.    Total time of 30 minutes spent with the patient today in counseling and coordination of care.      Again, thank you for allowing me to participate in the care of your patient.        Sincerely,        Kate Gleason PA-C

## 2022-03-07 ENCOUNTER — OFFICE VISIT (OUTPATIENT)
Dept: FAMILY MEDICINE | Facility: CLINIC | Age: 47
End: 2022-03-07
Payer: COMMERCIAL

## 2022-03-07 ENCOUNTER — ANCILLARY PROCEDURE (OUTPATIENT)
Dept: MRI IMAGING | Facility: CLINIC | Age: 47
End: 2022-03-07
Attending: PHYSICIAN ASSISTANT
Payer: COMMERCIAL

## 2022-03-07 VITALS
WEIGHT: 210 LBS | DIASTOLIC BLOOD PRESSURE: 90 MMHG | SYSTOLIC BLOOD PRESSURE: 138 MMHG | TEMPERATURE: 97.2 F | BODY MASS INDEX: 28.48 KG/M2 | OXYGEN SATURATION: 100 % | RESPIRATION RATE: 16 BRPM | HEART RATE: 50 BPM

## 2022-03-07 DIAGNOSIS — Z82.49 FAMILY HISTORY OF ISCHEMIC HEART DISEASE: ICD-10-CM

## 2022-03-07 DIAGNOSIS — E78.6 ABNORMALLY LOW HIGH DENSITY LIPOPROTEIN (HDL) CHOLESTEROL WITH HYPERTRIGLYCERIDEMIA: ICD-10-CM

## 2022-03-07 DIAGNOSIS — M54.12 CERVICAL RADICULOPATHY: ICD-10-CM

## 2022-03-07 DIAGNOSIS — R20.2 NUMBNESS AND TINGLING IN RIGHT HAND: Primary | ICD-10-CM

## 2022-03-07 DIAGNOSIS — L80 VITILIGO: ICD-10-CM

## 2022-03-07 DIAGNOSIS — Z13.1 SCREENING FOR DIABETES MELLITUS: ICD-10-CM

## 2022-03-07 DIAGNOSIS — M79.621 PAIN OF RIGHT UPPER ARM: ICD-10-CM

## 2022-03-07 DIAGNOSIS — E78.1 ABNORMALLY LOW HIGH DENSITY LIPOPROTEIN (HDL) CHOLESTEROL WITH HYPERTRIGLYCERIDEMIA: ICD-10-CM

## 2022-03-07 DIAGNOSIS — N52.9 ERECTILE DYSFUNCTION, UNSPECIFIED ERECTILE DYSFUNCTION TYPE: ICD-10-CM

## 2022-03-07 DIAGNOSIS — R20.0 NUMBNESS AND TINGLING IN RIGHT HAND: ICD-10-CM

## 2022-03-07 DIAGNOSIS — Z86.2 HISTORY OF DECREASED PLATELET COUNT: ICD-10-CM

## 2022-03-07 DIAGNOSIS — E55.9 VITAMIN D DEFICIENCY: ICD-10-CM

## 2022-03-07 DIAGNOSIS — R03.0 ELEVATED BLOOD PRESSURE READING WITHOUT DIAGNOSIS OF HYPERTENSION: ICD-10-CM

## 2022-03-07 DIAGNOSIS — R20.2 NUMBNESS AND TINGLING IN RIGHT HAND: ICD-10-CM

## 2022-03-07 DIAGNOSIS — D72.819 LEUKOPENIA, UNSPECIFIED TYPE: ICD-10-CM

## 2022-03-07 DIAGNOSIS — R20.0 NUMBNESS AND TINGLING IN RIGHT HAND: Primary | ICD-10-CM

## 2022-03-07 PROBLEM — D17.21 LIPOMA OF RIGHT FOREARM: Status: ACTIVE | Noted: 2022-03-07

## 2022-03-07 LAB
ALBUMIN SERPL-MCNC: 4.1 G/DL (ref 3.4–5)
ALP SERPL-CCNC: 94 U/L (ref 40–150)
ALT SERPL W P-5'-P-CCNC: 44 U/L (ref 0–70)
ANION GAP SERPL CALCULATED.3IONS-SCNC: 7 MMOL/L (ref 3–14)
AST SERPL W P-5'-P-CCNC: 21 U/L (ref 0–45)
BASOPHILS # BLD AUTO: 0 10E3/UL (ref 0–0.2)
BASOPHILS NFR BLD AUTO: 1 %
BILIRUB SERPL-MCNC: 1 MG/DL (ref 0.2–1.3)
BUN SERPL-MCNC: 13 MG/DL (ref 7–30)
CALCIUM SERPL-MCNC: 9.2 MG/DL (ref 8.5–10.1)
CHLORIDE BLD-SCNC: 110 MMOL/L (ref 94–109)
CO2 SERPL-SCNC: 24 MMOL/L (ref 20–32)
CREAT SERPL-MCNC: 0.79 MG/DL (ref 0.66–1.25)
EOSINOPHIL # BLD AUTO: 0.2 10E3/UL (ref 0–0.7)
EOSINOPHIL NFR BLD AUTO: 5 %
ERYTHROCYTE [DISTWIDTH] IN BLOOD BY AUTOMATED COUNT: 11.9 % (ref 10–15)
GFR SERPL CREATININE-BSD FRML MDRD: >90 ML/MIN/1.73M2
GLUCOSE BLD-MCNC: 112 MG/DL (ref 70–99)
HBA1C MFR BLD: 5.5 % (ref 0–5.6)
HCT VFR BLD AUTO: 43.1 % (ref 40–53)
HGB BLD-MCNC: 14.6 G/DL (ref 13.3–17.7)
IMM GRANULOCYTES # BLD: 0 10E3/UL
IMM GRANULOCYTES NFR BLD: 0 %
LYMPHOCYTES # BLD AUTO: 1.5 10E3/UL (ref 0.8–5.3)
LYMPHOCYTES NFR BLD AUTO: 36 %
MCH RBC QN AUTO: 30.8 PG (ref 26.5–33)
MCHC RBC AUTO-ENTMCNC: 33.9 G/DL (ref 31.5–36.5)
MCV RBC AUTO: 91 FL (ref 78–100)
MONOCYTES # BLD AUTO: 0.5 10E3/UL (ref 0–1.3)
MONOCYTES NFR BLD AUTO: 12 %
NEUTROPHILS # BLD AUTO: 2 10E3/UL (ref 1.6–8.3)
NEUTROPHILS NFR BLD AUTO: 47 %
PLATELET # BLD AUTO: 145 10E3/UL (ref 150–450)
POTASSIUM BLD-SCNC: 4.2 MMOL/L (ref 3.4–5.3)
PROT SERPL-MCNC: 7.8 G/DL (ref 6.8–8.8)
RBC # BLD AUTO: 4.74 10E6/UL (ref 4.4–5.9)
SODIUM SERPL-SCNC: 141 MMOL/L (ref 133–144)
TSH SERPL DL<=0.005 MIU/L-ACNC: 2.27 MU/L (ref 0.4–4)
VIT B12 SERPL-MCNC: 476 PG/ML (ref 193–986)
WBC # BLD AUTO: 4.2 10E3/UL (ref 4–11)

## 2022-03-07 PROCEDURE — 83036 HEMOGLOBIN GLYCOSYLATED A1C: CPT | Performed by: FAMILY MEDICINE

## 2022-03-07 PROCEDURE — 72141 MRI NECK SPINE W/O DYE: CPT | Mod: GC | Performed by: RADIOLOGY

## 2022-03-07 PROCEDURE — 99215 OFFICE O/P EST HI 40 MIN: CPT | Performed by: FAMILY MEDICINE

## 2022-03-07 PROCEDURE — 82306 VITAMIN D 25 HYDROXY: CPT | Performed by: FAMILY MEDICINE

## 2022-03-07 PROCEDURE — 80050 GENERAL HEALTH PANEL: CPT | Performed by: FAMILY MEDICINE

## 2022-03-07 PROCEDURE — 82607 VITAMIN B-12: CPT | Performed by: FAMILY MEDICINE

## 2022-03-07 PROCEDURE — 36415 COLL VENOUS BLD VENIPUNCTURE: CPT | Performed by: FAMILY MEDICINE

## 2022-03-07 RX ORDER — GABAPENTIN 100 MG/1
100 CAPSULE ORAL 3 TIMES DAILY
Status: CANCELLED | OUTPATIENT
Start: 2022-03-07

## 2022-03-07 RX ORDER — TADALAFIL 5 MG/1
5 TABLET ORAL DAILY PRN
Qty: 12 TABLET | Refills: 0 | Status: SHIPPED | OUTPATIENT
Start: 2022-03-07 | End: 2022-03-31

## 2022-03-07 RX ORDER — METHYLPREDNISOLONE 4 MG
TABLET, DOSE PACK ORAL
Qty: 21 TABLET | Refills: 0 | Status: CANCELLED | OUTPATIENT
Start: 2022-03-07

## 2022-03-07 RX ORDER — TIZANIDINE 2 MG/1
2 TABLET ORAL 3 TIMES DAILY
Qty: 30 TABLET | Refills: 0 | Status: CANCELLED | OUTPATIENT
Start: 2022-03-07 | End: 2022-03-17

## 2022-03-07 NOTE — RESULT ENCOUNTER NOTE
Kuldeep Mr. Xie,  Some of your results came back and are within acceptable limits. -Normal red blood cell (hgb) levels, normal white blood cell count and low platelet levels.  But improved from before likely this is your baseline  No sign of diabetes-A1C (diabetic test) is normal and indicates that your blood sugar has been in a normal range the last 3 months.   If you have any further concerns please do not hesitate to contact us by message, phone or making an appointment.  Have a good day   Dr Haresh GUERRERO

## 2022-03-07 NOTE — RESULT ENCOUNTER NOTE
Kuldeep Mr. Xie,  Your results came back and are within acceptable limits.   -Liver and gallbladder tests (ALT,AST, Alk phos,bilirubin) are normal.  -Kidney function (GFR) is normal.  -Sodium is normal.  -Potassium is normal.  -Calcium is normal.  -Glucose is mildly elevated but you were nonfasting and this is okay..  -TSH (thyroid stimulating hormone) level is normal which indicates normal thyroid function.  . If you have any further concerns please do not hesitate to contact us by message, phone or making an appointment.  Have a good day   Dr Haresh GUERRERO

## 2022-03-07 NOTE — PATIENT INSTRUCTIONS
For numbness tingling right hand, right arm pain, hx of cervical radiculopathy & right upper back pain between scapula and spine that started after episode of snowboarding in December and has persisted, seen by neurosurgeon and MRI C-spine to be done today and will go from there.  May benefit from traction of the C-spine.    For ED we will do lab work today including screen for diabetes.  Testosterone in the fall was normal.  Could be stress and age-related.  Urology referral given.  Discussed options and feels Cialis is covered by insurance.  Take 5 mg half an hour prior to sexual activity as needed.  May be effective for 36 hours after single dose.  Do not split dose.  Counseled about risk of flushing, headache, sinus infection, priapism.  Avoid any alpha blockers on this medication.  If should have chest pain or EMS comes to the house or presents to ER needs to disclose if used this medication in the prior 48 hours to prevent being given meds that may cause drop in blood pressure.  Given number 12 tablets to try if not helpful could consider next dose up.     Continue care with dermatology regarding vitiligo.  Continue with sun protection.    Check vitamin D and CBC given prior history of low white count platelets and vitamin D.    Elevated blood pressure.  Recheck borderline.  Could be related to stress and pain.  Check at home and if consistently over 130 x 80 to give us a call.  Decrease salt in diet, limit alcohol to 1 drink in a 24.  No more than 7 total a week, avoid anti-inflammatories as much as possible.  Limit caffeine intake.  Stay active and work on losing a few pounds.  Recheck at upcoming physical    Due to elevated blood pressure, low HDL elevated triglycerides, BMI 28, family history of heart disease discussed and referred to preventive cardiology for evaluation.    Last physical was done in August.  But needs a camp physical done in May and June so can make a big hiking camping trip in July.  I  can see you back in May or June as planned and do both the camp physical and routine physical at the same time as long as it is okay with your insurance.

## 2022-03-07 NOTE — PROGRESS NOTES
Assessment & Plan     Numbness and tingling in right hand  For numbness tingling right hand, right arm pain, hx of cervical radiculopathy & right upper back pain between scapula and spine that started after episode of snowboarding in December and has persisted, seen by neurosurgeon and MRI C-spine to be done today and will go from there. May benefit from traction of the C-spine. Right upper back & arm pain and tingling numbness in right middle three fingers of hand, could be from cervical spine.  We will get MRI per neurosurgery today based on results make further recommendations if C-spine does not explain symptoms then may investigate further for ulnar or carpal tunnel neuropathy    For ED we will do lab work today including screen for diabetes.  Testosterone in the fall was normal.  Could be stress and age-related.  Urology referral given.  Discussed options and feels Cialis is covered by insurance.  Take 5 mg half an hour prior to sexual activity as needed.  May be effective for 36 hours after single dose.  Do not split dose.  Counseled about risk of flushing, headache, sinus infection, priapism. Avoid any alpha blockers on this medication.  If should have chest pain or EMS comes to the house or presents to ER needs to disclose if used this medication in the prior 48 hours to prevent being given meds that may cause drop in blood pressure.  Given number 12 tablets to try if not helpful could consider next dose up.     Continue care with dermatology regarding vitiligo.  Continue with sun protection.    Check vitamin D and CBC given prior history of low white count platelets and vitamin D.    Had an elevated blood pressure.  Recheck borderline.  This is new. Could be related to stress and pain.  To check at home and if consistently over 130 x 80 to give us a call.  Decrease salt in diet, limit alcohol to 1 drink in a 24 hr time frame.  No more than 7 total a week, avoid anti-inflammatories as much as possible.   Limit caffeine intake.  Stay active and work on losing a few pounds.  Recheck at upcoming physical    Due to elevated blood pressure, low HDL elevated triglycerides, BMI 28, family history of heart disease discussed and referred to preventive cardiology for evaluation.    Last physical was done in August.  But needs a camp physical done in May and June so can make a big hiking camping trip in July.  I can see him  back in May or June as planned and do both the camp physical and routine physical at the same time as long as it is okay with his insurance.  - Vitamin D Deficiency; Future  - Vitamin B12; Future  - CBC with platelets and differential; Future  - Hemoglobin A1c; Future  - Comprehensive metabolic panel (BMP + Alb, Alk Phos, ALT, AST, Total. Bili, TP); Future  - TSH with free T4 reflex; Future  - Vitamin D Deficiency  - Vitamin B12  - CBC with platelets and differential  - Hemoglobin A1c  - Comprehensive metabolic panel (BMP + Alb, Alk Phos, ALT, AST, Total. Bili, TP)  - TSH with free T4 reflex    Pain of right upper arm  Unchanged started in January also.  Is going to get an MRI C-spine today  - Vitamin D Deficiency; Future  - Vitamin B12; Future  - CBC with platelets and differential; Future  - Vitamin D Deficiency  - Vitamin B12  - CBC with platelets and differential    Cervical radiculopathy  Right upper back & arm pain and tingling numbness in right middle three fingers of hand, could be from cervical spine.  We will get MRI per neurosurgery today based on results make further recommendations if C-spine does not explain symptoms then may investigate further for ulnar or carpal tunnel neuropathy  - Vitamin B12; Future  - CBC with platelets and differential; Future  - Vitamin B12  - CBC with platelets and differential    Erectile dysfunction, unspecified erectile dysfunction type  For ED we will do lab work today including screen for diabetes.  Testosterone in the fall was normal.  Could be stress and  age-related.  Urology referral given.  Discussed options and feels Cialis is covered by insurance.  Take 5 mg half an hour prior to sexual activity as needed.  May be effective for 36 hours after single dose.  Do not split dose.  Counseled about risk of flushing, headache, sinus infection, priapism. Avoid any alpha blockers on this medication.  If should have chest pain or EMS comes to the house or presents to ER needs to disclose if used this medication in the prior 48 hours to prevent being given meds that may cause drop in blood pressure.  Given number 12 tablets to try if not helpful could consider next dose up.   - Vitamin B12; Future  - CBC with platelets and differential; Future  - tadalafil (CIALIS) 5 MG tablet; Take 1 tablet (5 mg) by mouth daily as needed (ED)  - Hemoglobin A1c; Future  - Comprehensive metabolic panel (BMP + Alb, Alk Phos, ALT, AST, Total. Bili, TP); Future  - TSH with free T4 reflex; Future  - Adult Urology Referral; Future  - Vitamin B12  - CBC with platelets and differential  - Hemoglobin A1c  - Comprehensive metabolic panel (BMP + Alb, Alk Phos, ALT, AST, Total. Bili, TP)  - TSH with free T4 reflex    Vitiligo  Continue care with dermatology regarding vitiligo.  Continue with sun protection.  - CBC with platelets and differential; Future  - CBC with platelets and differential    Vitamin D deficiency  Check vitamin D today & make further recommendations.  - Vitamin D Deficiency; Future  - Vitamin D Deficiency    Leukopenia, unspecified type  History of decreased platelet count  Asymptomatic   - CBC with platelets and differential; Future  - TSH with free T4 reflex; Future  - CBC with platelets and differential  - TSH with free T4 reflex    Elevated blood pressure reading without diagnosis of hypertension  Had an elevated blood pressure.  Recheck borderline.  This is new. Could be related to stress and pain.  To check at home and if consistently over 130 x 80 to give us a call.  Decrease  salt in diet, limit alcohol to 1 drink in a 24 hr time frame.  No more than 7 total a week, avoid anti-inflammatories as much as possible.  Limit caffeine intake.  Stay active and work on losing a few pounds.  Recheck at upcoming physical    Abnormally low high density lipoprotein (HDL) cholesterol with hypertriglyceridemia  BMI 28.0-28.9,adult  Family history of ischemic heart disease  Due to elevated blood pressure, low HDL elevated triglycerides, BMI 28, family history of heart disease discussed and referred to preventive cardiology for evaluation.  - Adult Cardiology Eval  Referral; Future    Screening for diabetes mellitus  - Hemoglobin A1c; Future  - Hemoglobin A1c    Review of the result(s) of each unique test - labs since 2021 to present  Ordering of each unique test  Prescription drug management  60 minutes spent on the date of the encounter doing chart review, history and exam, documentation and further activities per the note     BMI:   Estimated body mass index is 28.48 kg/m  as calculated from the following:    Height as of 2/14/22: 1.829 m (6').    Weight as of this encounter: 95.3 kg (210 lb).   Weight management plan: Discussed healthy diet and exercise guidelines    Work on weight loss  Regular exercise  See Patient Instructions    Return in about 4 weeks (around 4/4/2022) for Follow up, with me, in person.    Izzy Hutson MD  [facility]    Modesto Mar is a 46 year old who presents for the following health issues     History of Present Illness       Reason for visit:  Tingling in hands  Symptom onset:  More than a month  Symptom intensity:  Mild  Symptom progression:  Staying the same  Had these symptoms before:  Yes  Has tried/received treatment for these symptoms:  Yes  Previous treatment was successful:  No    He eats 4 or more servings of fruits and vegetables daily.He consumes 0 sweetened beverage(s) daily.He exercises with enough effort to increase  his heart rate 9 or less minutes per day.  He exercises with enough effort to increase his heart rate 3 or less days per week.   He is taking medications regularly.       Concern -  Tingling in Hands   Onset: 3-4 months ago   Description: pt states he has pain in his pain and radiates to his shoulder and intermittent numbness and tingling in right hand.   Intensity: mild  Progression of Symptoms:  same and intermittent  Accompanying Signs & Symptoms: none/ right arm pain  Previous history of similar problem: yes on the other side, cervical radiculopathy  Precipitating factors:        Worsened by: movement   Alleviating factors:        Improved by: none  Therapies tried and outcome:  none     CURRENTLY    Had gone snow boarding before christmas, and after that noted back pain that extended in a linear fashion from the top of his shoulder blade to the end of it in the region between the spine and the shoulder blade.  At first he ignored it for a few weeks thinking it could have been a pulled muscle in his back.  He also started noticing pain in his right arm related to posture.  If he kept his shoulders up and head down there was less aggravation.  He started noticing numbness and tingling in his right hand.  If he sat upright and j carlos shoulders to natural resting position and kept his head up he would feel the pain developing in that area and if he sat long enough in that position then his right middle index and ring finger could get tingly and numb.  There was no weakness or decrease in power he has not been dropping stuff.  He is right-handed.  He can feel it when he sits at the computer.  It similar to symptoms he had on the left side 1 year ago.  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  Then he happened to have a lipoma removed on the left side and his symptoms resolved 80% that was not explainable.    When  started with the numbness and tingling in the right hand he sent us a [employer] message around 9 February that it had been occurring for about 3 to 5 days and was referred to physical therapy and orthopedics.  He was seen by the neurosurgeon on 2/14/2022.  Opted to defer physical therapy.  Symptoms are manageable with postural changes and not requiring medication.  MRI C-spine was ordered to evaluate more and they were considering traction of the spine if MRI allowed.  Prefers to avoid ibuprofen due to prior side effects.  Hesitant to take Medrol or muscle relaxant as things are not so severe and Medrol would also upset his stomach.  If he keeps his chin down and shoulders down, that manages the symptoms but he cannot live like that long.  He is waiting to see what the MRI C-spine scheduled for today shows and we will go from there.  He was evaluated and treated for cervical radiculopathy causing left-sided symptoms 1 year ago.  At that time had done physical therapy.    He had sent an email in the fall desiring testosterone levels which were checked and came back normal.  His main concern is about sexual performance.  No decreased libido but has erectile dysfunction.  Sometimes weaker erections sometimes not at all.  Has been a stressful last few years and currently moving clinic from [facility] to [facility].  He is wondering about getting a prescription or seeing urology.  He checked his insurance and feels that Cialis would be covered.  Was checked for diabetes and thyroid before and those have been normal.    Vitiligo.  Has done light therapy in the past.  Did see dermatology in the fall notes not available but reports no biopsy done in the continuing to monitor things.    History of vitamin D deficiency has not been super consistent with taking vitamin D currently on 4000 units 50% of the time.    History of leukopenia and decreased platelets no increased infection or bleeding  issues.  One of his sons tested positive for COVID for the rest of the family stayed well.    Elevated blood pressure without prior history of hypertension blood pressure also noted to be elevated when seen by the neurosurgeon on the .  Blood pressure initially 140 x 90 recheck 138 x 90.  Will check at home and contact us if consistently elevated.  Currently denies any chest pain trouble breathing shortness of breath or palpitations.    Does have history of low HDL and hypertriglyceridemia and BMI is 28 with family history of ischemic heart disease.  Grandfather  of an MI even before he was born.  The 10-year ASCVD risk score (Brokawshara CHEN Jr., et al., 2013) is: 3.1%    Values used to calculate the score:      Age: 46 years      Sex: Male      Is Non- : No      Diabetic: No      Tobacco smoker: No      Systolic Blood Pressure: 138 mmHg      Is BP treated: No      HDL Cholesterol: 37 mg/dL      Total Cholesterol: 156 mg/dL  Will do lipids another day likely at physical when fasting.  Would be open to a cardiology referral.    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 U/S done 2/15 for large subcutaneous mass left lateral upper back showed a lipoma, referred to the surgeon, seen 3/22/21 and noted to get surgically removed under local anesthesia.  Seen first time on 2021 by this provider for a prep for lipoma excision under local. Camp physical &  preventive labs done. Addressed hx of impaired glucose, elevated TG in past, BMI, vitiligo hx and labs done showed glucose of 104, HBA1c was normal at 5.5, negative for Hep C, HIV, normal TSH, Low HDL, LDL & TG were normal, Rest of BMP normal. CBC showed stable low wbc and platelets of 145 previously 115. Reminded to work on advance directives. Was to continue routine care & preventive physical with PCP Dr Marino. Had lipoma excision left back on 4/26/21.   Seen 8/11/21 for preventive health and additional concerns.  Advised self testicular check regularly. Defered checking labs done in April . Cbc done due to low wbc & platelets in the past. Noted had recovered from lipoma excision & coincidentally the nerve pain he'd had prior to that resolved 80 % after surgery. Felt the left neck pinched nerve was better doing some strength training, & stretching helped & declined need to see ortho. Had completed PT around same time had excision of lipoma and strangely noted improvement after lipoma excision. Noted had gone mostly vegetarian since feb, eliminated 80 percent red meat, was eating some fish and chicken. BMI down from 29 to 27. Vitiligo was unchanged, had done UVB Rx 5 yrs prior & seen derm also for hx of prior atypical nevi on Bx. Due for a follow up & was to schedule with derm on his own. Hx of varicocele not bothering him, dx in high school, dad of three. Had a Lipoma right forearm not bothering him, unchanged. Was to monitor. Everyone in family vaccinated against Covid. Hx of vit d def in past, took in the winter time. Health care maintenance reviewed. Was to follow up with the dentist. For colon cancer screening, colonoscopy ordered.   Vitamin D came back low ferritin and B12 normal and advise increase vitamin D in diet.  WBC and platelets were low but stable to prior and possibly his new baseline.  Colonoscopy done 11/29/2021 was normal and advised to recheck in 10 years.  Call to get checked for testosterone  12/8/2021 request sent in and this came back normal.  On 2/9 communicated via [email or web address] about numbness and tingling right hand 3 to 5 days.  Referred to orthopedics and physical therapy.  Opted to hold off on physical therapy seen by neurosurgeon on 2/14/2022 MRI C-spine advised to consider C-spine traction.  [place]  oxycodone 5 mg # 10 on 4/26/21    Review of Systems   Constitutional, HEENT, cardiovascular, pulmonary, GI, , musculoskeletal, neuro, skin, endocrine and psych systems are negative, except as otherwise noted.      Objective    BP (!) 138/90   Pulse 50   Temp 97.2  F (36.2  C) (Temporal)   Resp 16   Wt 95.3 kg (210 lb)   SpO2 100%   BMI 28.48 kg/m    Body mass index is 28.48 kg/m .  Physical Exam   GENERAL: healthy, alert, no distress and over weight  EYES: Eyes grossly normal to inspection, PERRL and conjunctivae and sclerae normal  RESP: lungs clear to auscultation - no rales, rhonchi or wheezes  CV: regular rate and rhythm, normal S1 S2, no S3 or S4, no murmur, click or rub, no peripheral edema and peripheral pulses strong  ABDOMEN: soft, nontender  MS: no gross musculoskeletal defects noted, no edema  NEURO: Normal strength and tone, mentation intact and speech normal  PSYCH: mentation appears normal, affect normal/bright    Results for orders placed or performed in visit on 03/07/22 (from the past 24 hour(s))   CBC with platelets and differential    Narrative    The following orders were created for panel order CBC with platelets and differential.  Procedure                               Abnormality         Status                     ---------                               -----------         ------                     CBC with platelets and d...[[id number]]  Abnormal            Final result                 Please view results for these tests on the individual orders.   Hemoglobin A1c   Result Value Ref Range    Hemoglobin A1C 5.5 0.0 - 5.6 %   Comprehensive metabolic panel (BMP + Alb, Alk Phos,  ALT, AST, Total. Bili, TP)   Result Value Ref Range    Sodium 141 133 - 144 mmol/L    Potassium 4.2 3.4 - 5.3 mmol/L    Chloride 110 (H) 94 - 109 mmol/L    Carbon Dioxide (CO2) 24 20 - 32 mmol/L    Anion Gap 7 3 - 14 mmol/L    Urea Nitrogen 13 7 - 30 mg/dL    Creatinine 0.79 0.66 - 1.25 mg/dL    Calcium 9.2 8.5 - 10.1 mg/dL    Glucose 112 (H) 70 - 99 mg/dL    Alkaline Phosphatase 94 40 - 150 U/L    AST 21 0 - 45 U/L    ALT 44 0 - 70 U/L    Protein Total 7.8 6.8 - 8.8 g/dL    Albumin 4.1 3.4 - 5.0 g/dL    Bilirubin Total 1.0 0.2 - 1.3 mg/dL    GFR Estimate >90 >60 mL/min/1.73m2   TSH with free T4 reflex   Result Value Ref Range    TSH 2.27 0.40 - 4.00 mU/L   CBC with platelets and differential   Result Value Ref Range    WBC Count 4.2 4.0 - 11.0 10e3/uL    RBC Count 4.74 4.40 - 5.90 10e6/uL    Hemoglobin 14.6 13.3 - 17.7 g/dL    Hematocrit 43.1 40.0 - 53.0 %    MCV 91 78 - 100 fL    MCH 30.8 26.5 - 33.0 pg    MCHC 33.9 31.5 - 36.5 g/dL    RDW 11.9 10.0 - 15.0 %    Platelet Count 145 (L) 150 - 450 10e3/uL    % Neutrophils 47 %    % Lymphocytes 36 %    % Monocytes 12 %    % Eosinophils 5 %    % Basophils 1 %    % Immature Granulocytes 0 %    Absolute Neutrophils 2.0 1.6 - 8.3 10e3/uL    Absolute Lymphocytes 1.5 0.8 - 5.3 10e3/uL    Absolute Monocytes 0.5 0.0 - 1.3 10e3/uL    Absolute Eosinophils 0.2 0.0 - 0.7 10e3/uL    Absolute Basophils 0.0 0.0 - 0.2 10e3/uL    Absolute Immature Granulocytes 0.0 <=0.4 10e3/uL

## 2022-03-08 NOTE — RESULT ENCOUNTER NOTE
Kludeep Mr. Xie,  Your results came back with a normal Vitamin B 12  . If you have any further concerns please do not hesitate to contact us by message, phone or making an appointment.  Have a good day   Dr Haresh GUERRERO

## 2022-03-09 LAB — DEPRECATED CALCIDIOL+CALCIFEROL SERPL-MC: 29 UG/L (ref 20–75)

## 2022-03-10 NOTE — RESULT ENCOUNTER NOTE
Kuldeep Mr. Xie,  Your results came back and are within acceptable limits. -Vitamin D level is normal and getting 2000 IU daily in your diet or supplements is recommended. . If you have any further concerns please do not hesitate to contact us by message, phone or making an appointment.  Have a good day   Dr Haresh GUERRERO

## 2022-03-14 ENCOUNTER — TELEPHONE (OUTPATIENT)
Dept: NEUROSURGERY | Facility: CLINIC | Age: 47
End: 2022-03-14
Payer: COMMERCIAL

## 2022-03-14 NOTE — TELEPHONE ENCOUNTER
Patient would like a call back to go over his treatment plan and what his next steps should be. Please call him back at [phone]. Thank you~

## 2022-03-21 ENCOUNTER — THERAPY VISIT (OUTPATIENT)
Dept: PHYSICAL THERAPY | Facility: CLINIC | Age: 47
End: 2022-03-21
Attending: PHYSICIAN ASSISTANT
Payer: COMMERCIAL

## 2022-03-21 DIAGNOSIS — R20.0 NUMBNESS AND TINGLING IN RIGHT HAND: ICD-10-CM

## 2022-03-21 DIAGNOSIS — M54.12 CERVICAL RADICULOPATHY: ICD-10-CM

## 2022-03-21 DIAGNOSIS — R20.2 NUMBNESS AND TINGLING IN RIGHT HAND: ICD-10-CM

## 2022-03-21 PROCEDURE — 97161 PT EVAL LOW COMPLEX 20 MIN: CPT | Mod: GP | Performed by: PHYSICAL THERAPIST

## 2022-03-21 PROCEDURE — 97110 THERAPEUTIC EXERCISES: CPT | Mod: GP | Performed by: PHYSICAL THERAPIST

## 2022-03-21 PROCEDURE — 97140 MANUAL THERAPY 1/> REGIONS: CPT | Mod: GP | Performed by: PHYSICAL THERAPIST

## 2022-03-21 NOTE — PROGRESS NOTES
[facility] Initial Evaluation  3/21/2022     Precautions/Restrictions/MD instructions: eval and treat    Therapist Assessment: Zaid Xie is a 46 year old male patient presenting to Physical Therapy with R sided neck pain. Patient demonstrates + Spurling's R, + distraction, decreased cervical rotation to L, decreased cervical extension, - ULTT A, increased stiffness C5-C7. Signs and symptoms are consistent with cervical radiculopathy. These impairments limit their ability to sit for prolonged periods and sleep without pain. Skilled PT services are necessary in order to reduce impairments and improve independent function.    Subjective History    Injury/Condition Details:  Presenting Complaint R sided neck pain   Onset Timing/Date 3 months, (Doctor's referral 2/14/2022)   Mechanism Snowboarding      Symptom Behavior Details    Primary Symptoms Constant symptoms; worsen with activity, pain (Location: base of neck into R shoulder, Quality: Aching/Throbbing), stiffness      Intermittent numbness, tingling, changes in sensation to digits 2nd, 3rd, 4th of R hand.      Denies locking, catching, giving way, or instability.Denies having related symptoms spreading to the L upper trap.    Worst Pain 4/10 (with sleeping)   Symptom Provocators Sleeping, sitting, moving shoulder around    Best Pain 0/10    Symptom Relievers Stretching    Time of day dependent? Worse in evening after activity   Recent symptom change? symptoms worsening     Prior Testing/Intervention for current condition:  Prior Tests  MRI   Prior Treatment PT      Lifestyle & General Medical History:  Employment Optometrist    Usual physical activities  (within past year) Snowboarding, running, walking    Orthopaedic History  L sided neck pain   Medication  None reported by patient   Notable medical history Numbness/tingling    Patient goals Decrease pain, decrease tingling    Patient Reported Health good     Red Flags:  (Bold when present) - reviewed the following and denies  Vertebrobasilar Artery   Symptom   Dysphagia Drop Attack   Dysarthria Dizziness   Diplopia Paresthesia of lips   Spinal Cord  Symptom   Bi/Quadriparesthesia Ataxia   Hemiparesthesia Urinary incontinence   Bi/Quadriparesis Fecal incontinence     Cranial Nerves - bold when abnormality is present  Cranial nerve   II-[place] VIII-Loss of Balance   III-Diplopia VIII-Hypoacousia   V-Facial paresthesia IX-Dysarthria   VII-Altered Taste IX-Dysphagia    X-Nausea       PHYSICAL THERAPY CERVICAL EXAMINATION    STATIC POSTURE  Forward head on neck, Increased thoracic kyphosis and Rounded shoulders    Cervical Spine ROM Screen   RIGHT LEFT   Cervical Spine ROM   Flexion Nil loss    Extension Mod loss + pain   Rotation Nil loss Min loss    Sidebend Min loss Min loss   Seated Thoracic Spine ROM   Rotation Nil loss  Nil loss    Flexion Nil loss  Nil loss    Extension Min loss  Min loss     Passive Joint Mobility Screen: C6-C7 stiffness bilaterally    Tender to palpation at the following structures: suboccipitals, cervical paraspinals   NOT tender to palpation at the following structures:  Thoracic paraspinals    SUSPECTED DIRECTIONAL PREFERENCE: extension     Upper Extremity Neural System Examination  Myotomes Strength (MMT)    Left Pain Right Pain   Resisted ABD (C5) 5/5 - 5/5 -   Resisted Elbow Flexion (C6)                 Wrist Extension 5/5 -  - 5/5 -  -   Resisted Elbow Extension (C7)                 Wrist Flexion 5/5   -   5/5   -     Resisted Thumb Extension (C8) 5/5 - 5/5 -   Resisted Intrinsics (T1) 5/5 - 5/5 -   Sensory/Reflex Tests: SILT and symmetrical for bilateral UE's.       Right Left   Upper Limb Tension Testing     Medial - -   Ulnar - -   Radial - -   - = normal  + = mild tightness  ++ = moderate tightness  +++ = significant tightness      Upper Extremity Flexibility Screen:   Right Left   Pec Major ++ ++   Pec Minor - -   Upper Trap + +   Levator Scapula -  -   Scalenes - -   SubOccipital ++ ++   Erector Spinae  - -   - = normal  + = mild tightness  ++ = moderate tightness  +++ = significant tightness    Static Tests:   Alar Ligament test: negative  Transverse Ligament Test:negatibe  Spurlings:+ R   Compression: + R/EXT  Distraction: + reduction of symptoms     Cervical Rotation Lateral Flexion Test   Left -   Right  -       ASSESSMENT/PLAN:    The patient is a 46 year old male with chief complaint of R sided neck pain.    The patient has the following significant findings with corresponding treatment plan.  Diagnosis 1:  Signs and symptoms consistent with cervical radiculopathy     Pain -  manual therapy, splint/taping/bracing/orthotics, self management, education, directional preference exercise and home program  Decreased ROM/flexibility - manual therapy, therapeutic exercise and home program  Decreased joint mobility - manual therapy, therapeutic exercise and home program  Decreased strength - therapeutic exercise, therapeutic activities and home program  Impaired balance - neuro re-education, therapeutic activities and home program  Decreased proprioception - neuro re-education and therapeutic activities  Impaired gait - gait training and assistive devices  Impaired muscle performance - neuro re-education and home program  Decreased function - therapeutic activities and home program  Impaired posture - neuro re-education, therapeutic activities and home program  Instability -  Therapeutic Activity, Therapeutic Exercise, Neuromuscular Re-education, Splinting/Taping/Bracing/Orthotic, home program      Therapy Evaluation Codes:   1) History comprised of:   Personal factors that impact the plan of care:      None.    Comorbidity factors that impact the plan of care are:      Numbness/tingling.     Medications impacting care: None.  2) Examination of Body Systems comprised of:   Body structures and functions that impact the plan of care:      Cervical spine.   Activity  limitations that impact the plan of care are:      Sitting, Sleeping and Laying down.  3) Clinical presentation characteristics are:   Stable/Uncomplicated.  4) Decision-Making    Low complexity using standardized patient assessment instrument and/or measureable assessment of functional outcome.  Cumulative Therapy Evaluation is: Low complexity.    Previous and current functional limitations:  (See Goal Flow Sheet for this information)    Short term and Long term goals: (See Goal Flow Sheet for this information)     Communication ability:  Patient appears to be able to clearly communicate and understand verbal and written communication and follow directions correctly.  Treatment Explanation - The following has been discussed with the patient:   RX ordered/plan of care  Anticipated outcomes  Possible risks and side effects  This patient would benefit from PT intervention to resume normal activities.   Rehab potential is good.    Frequency:  1 X week, once daily  Duration:  for 6 visits  Discharge Plan: Achieve all LTGs, be independent in home treatment program, and reach maximal therapeutic benefit.    Please refer to the daily flowsheet for treatment today, total treatment time and time spent performing 1:1 timed codes.

## 2022-03-28 ENCOUNTER — THERAPY VISIT (OUTPATIENT)
Dept: PHYSICAL THERAPY | Facility: CLINIC | Age: 47
End: 2022-03-28
Payer: COMMERCIAL

## 2022-03-28 DIAGNOSIS — M54.12 CERVICAL RADICULOPATHY: ICD-10-CM

## 2022-03-28 DIAGNOSIS — R20.2 NUMBNESS AND TINGLING IN RIGHT HAND: ICD-10-CM

## 2022-03-28 DIAGNOSIS — R20.0 NUMBNESS AND TINGLING IN RIGHT HAND: ICD-10-CM

## 2022-03-28 PROCEDURE — 97530 THERAPEUTIC ACTIVITIES: CPT | Mod: GP | Performed by: PHYSICAL THERAPIST

## 2022-03-28 PROCEDURE — 97110 THERAPEUTIC EXERCISES: CPT | Mod: GP | Performed by: PHYSICAL THERAPIST

## 2022-03-28 PROCEDURE — 97012 MECHANICAL TRACTION THERAPY: CPT | Mod: GP | Performed by: PHYSICAL THERAPIST

## 2022-03-30 DIAGNOSIS — N52.9 ERECTILE DYSFUNCTION, UNSPECIFIED ERECTILE DYSFUNCTION TYPE: ICD-10-CM

## 2022-03-31 RX ORDER — TADALAFIL 5 MG/1
TABLET ORAL
Qty: 12 TABLET | Refills: 3 | Status: SHIPPED | OUTPATIENT
Start: 2022-03-31 | End: 2022-07-28

## 2022-04-11 ENCOUNTER — THERAPY VISIT (OUTPATIENT)
Dept: PHYSICAL THERAPY | Facility: CLINIC | Age: 47
End: 2022-04-11
Payer: COMMERCIAL

## 2022-04-11 DIAGNOSIS — R20.0 NUMBNESS AND TINGLING IN RIGHT HAND: ICD-10-CM

## 2022-04-11 DIAGNOSIS — R20.2 NUMBNESS AND TINGLING IN RIGHT HAND: ICD-10-CM

## 2022-04-11 DIAGNOSIS — M54.12 CERVICAL RADICULOPATHY: Primary | ICD-10-CM

## 2022-04-11 PROCEDURE — 97012 MECHANICAL TRACTION THERAPY: CPT | Mod: GP | Performed by: PHYSICAL THERAPIST

## 2022-04-11 PROCEDURE — 97110 THERAPEUTIC EXERCISES: CPT | Mod: GP | Performed by: PHYSICAL THERAPIST

## 2022-04-25 ENCOUNTER — MYC MEDICAL ADVICE (OUTPATIENT)
Dept: FAMILY MEDICINE | Facility: CLINIC | Age: 47
End: 2022-04-25
Payer: COMMERCIAL

## 2022-04-25 DIAGNOSIS — R20.0 NUMBNESS AND TINGLING IN RIGHT HAND: Primary | ICD-10-CM

## 2022-04-25 DIAGNOSIS — R20.2 NUMBNESS AND TINGLING IN RIGHT HAND: Primary | ICD-10-CM

## 2022-04-25 DIAGNOSIS — M54.12 CERVICAL RADICULOPATHY: ICD-10-CM

## 2022-04-26 NOTE — TELEPHONE ENCOUNTER
Patient informed completed copy is at  for pickup  Copy sent to abstraction  Copy kept in clinic

## 2022-04-26 NOTE — TELEPHONE ENCOUNTER
Reason for Call:  Form, our goal is to have forms completed with 72 hours, however, some forms may require a visit or additional information.    Type of letter, form or note:  camp    Who is the form from?: Patient    Where did the form come from: Patient or family brought in       What clinic location was the form placed at?: [facility]    Where the form was placed: Dr. Krause form folder POD C    What number is listed as a contact on the form?: [phone]       Additional comments: Patient states he needs by 4/28/22. Please see below message for more information.    Call taken on 4/26/2022 at 12:30 PM by Lilia Kowalski

## 2022-04-26 NOTE — TELEPHONE ENCOUNTER
PReparticipation physical form filled based on 8/11/21 physical and 3/11/22 office visit   May call him to  at   Given to TC to make a copy for epic and call patient

## 2022-04-28 NOTE — PROGRESS NOTES
PROGRESS  REPORT    Progress reporting period is from 3/21/2022 to 4/28/2022.       SUBJECTIVE   Subjective: Patient states last week he was on vacation and was not doing his exercises often. He states the plane ride down to [place] was painful, but on the way up was't as bad. He is able to sleep a lot better, and has been working on keeping his posture upright with walking    Current Pain level: 0/10.     Previous pain level was  7/10  .   Changes in function:  Yes (See Goal flowsheet attached for changes in current functional level)  Adverse reaction to treatment or activity: None    OBJECTIVE  Changes noted in objective findings:  Yes,   Objective: Cervical AROM: full, pain with extension. 2nd trial of mechanical traction wtih 25# with relief of symptoms after treatment. Patient would benefit from home cervical traction unit     ASSESSMENT/PLAN  Updated problem list and treatment plan: Diagnosis 1:  Signs and symptoms consistent with cervical radiculopathy  Pain -  mechanical traction, manual therapy, splint/taping/bracing/orthotics, self management, education and home program  Decreased ROM/flexibility - manual therapy, therapeutic exercise, therapeutic activity and home program  Decreased strength - therapeutic exercise, therapeutic activities and home program  Decreased function - therapeutic activities and home program  STG/LTGs have been met or progress has been made towards goals:  Yes (See Goal flow sheet completed today.)  Assessment of Progress: The patient's condition is improving.  The patient's condition has potential to improve.  Self Management Plans:  Patient has been instructed in a home treatment program.  Patient  has been instructed in self management of symptoms.  I have re-evaluated this patient and find that the nature, scope, duration and intensity of the therapy is appropriate for the medical condition of the patient.  Zaid continues to require the following intervention to meet STG and  LTG's:  PT    Recommendations:  This patient would benefit from continued therapy.     Frequency:  1xweek, once daily  Duration:  for 6 visits        Please refer to the daily flowsheet for treatment today, total treatment time and time spent performing 1:1 timed codes.

## 2022-04-28 NOTE — TELEPHONE ENCOUNTER
----- Message from Paige Salcido, PT sent at 4/28/2022  8:01 AM CDT -----  Regarding: Home Medical Order - Home Cervical Traction Unit  Good morning Kate,     I am the PT working with Zaid and Jake Broderick. We have been utilizing cervical traction for his cervical radiculopathy, and he is interested in having a home unit as this is relieving his symptoms. I was wondering if you would be willing to place an order to [facility] for a cervical traction unit for Zaid. I can facilitate after the order is placed. Please let me know if you have any questions.     Thank you,   Paige Salcido, PT   Desk phone: [phone]

## 2022-05-31 PROBLEM — M54.12 CERVICAL RADICULOPATHY: Status: RESOLVED | Noted: 2022-03-21 | Resolved: 2022-05-31

## 2022-05-31 PROBLEM — R20.0 NUMBNESS AND TINGLING IN RIGHT HAND: Status: RESOLVED | Noted: 2022-03-21 | Resolved: 2022-05-31

## 2022-05-31 PROBLEM — R20.2 NUMBNESS AND TINGLING IN RIGHT HAND: Status: RESOLVED | Noted: 2022-03-21 | Resolved: 2022-05-31

## 2022-05-31 NOTE — PROGRESS NOTES
Discharge Note    Progress reporting period is from last progress note on   to Apr 11, 2022.    Zaid failed to follow up and current status is unknown.  Please see information below for last relevant information on current status.  Patient seen for 3 visits.    SUBJECTIVE  Subjective changes noted by patient:  Patient states last week he was on vacation and was not doing his exercises often. He states the plane ride down to [place] was painful, but on the way up was't as bad. He is able to sleep a lot better, and has been working on keeping his posture upright with walking  .  Current pain level is 0/10.     Previous pain level was   .   Changes in function:  Yes (See Goal flowsheet attached for changes in current functional level)  Adverse reaction to treatment or activity: None    OBJECTIVE  Changes noted in objective findings: Cervical AROM: full, pain with extension. 2nd trial of mechanical traction wtih 25# with relief of symptoms after treatment. Patient would benefit from home cervical traction unit     ASSESSMENT/PLAN  Diagnosis: Cervical Radiculopathy to R    Updated problem list and treatment plan:   Pain - HEP  Decreased ROM/flexibility - HEP  Decreased function - HEP  Decreased strength - HEP  STG/LTGs have been met or progress has been made towards goals:  Yes, please see goal flowsheet for most current information  Assessment of Progress: current status is unknown.    Last current status: Pt is progressing as expected   Self Management Plans:  HEP  I have re-evaluated this patient and find that the nature, scope, duration and intensity of the therapy is appropriate for the medical condition of the patient.  Zaid continues to require the following intervention to meet STG and LTG's:  HEP.    Recommendations:  Discharge with current home program.  Patient to follow up with MD as needed.    Please refer to the daily flowsheet for treatment today, total treatment time and time spent performing 1:1 timed  codes.

## 2022-07-26 DIAGNOSIS — N52.9 ERECTILE DYSFUNCTION, UNSPECIFIED ERECTILE DYSFUNCTION TYPE: ICD-10-CM

## 2022-07-27 ENCOUNTER — TELEPHONE (OUTPATIENT)
Dept: NEUROLOGY | Facility: CLINIC | Age: 47
End: 2022-07-27

## 2022-07-27 NOTE — TELEPHONE ENCOUNTER
Writer asked to have images port to us. This was done by [facility], and I called patient AND [facility] to inform him that we have reports and imaging.    Josef Amato RN

## 2022-07-27 NOTE — TELEPHONE ENCOUNTER
[facility] Call Center    Phone Message    May a detailed message be left on voicemail: yes     Reason for Call: Other: Patient is requesting a call back to see if Dr. Callaway has the images from [facility] from his recent CT and MRI. Writer see's the results are in his chart but the actual images writer doesn't see. Patient is schedule with Dr. Callaway on 11/ 29/2022. Please call patient back at # [phone] to advise on the images.     Action Taken: Message routed to:  [facility] ([facility] Neurology     Travel Screening: Not Applicable

## 2022-07-28 ENCOUNTER — MYC MEDICAL ADVICE (OUTPATIENT)
Dept: FAMILY MEDICINE | Facility: CLINIC | Age: 47
End: 2022-07-28

## 2022-07-28 DIAGNOSIS — N52.9 ERECTILE DYSFUNCTION, UNSPECIFIED ERECTILE DYSFUNCTION TYPE: Primary | ICD-10-CM

## 2022-07-28 RX ORDER — TADALAFIL 5 MG/1
TABLET ORAL
Qty: 12 TABLET | Refills: 5 | Status: SHIPPED | OUTPATIENT
Start: 2022-07-28 | End: 2022-11-28

## 2022-07-28 NOTE — TELEPHONE ENCOUNTER
tadalifil Refilled per [facility] refill protocol    Ruthy Butler, BSN RN  [facility]

## 2022-07-29 ENCOUNTER — DOCUMENTATION ONLY (OUTPATIENT)
Dept: OTHER | Facility: CLINIC | Age: 47
End: 2022-07-29

## 2022-08-01 ENCOUNTER — OFFICE VISIT (OUTPATIENT)
Dept: FAMILY MEDICINE | Facility: CLINIC | Age: 47
End: 2022-08-01
Payer: COMMERCIAL

## 2022-08-01 VITALS
OXYGEN SATURATION: 97 % | TEMPERATURE: 97.2 F | SYSTOLIC BLOOD PRESSURE: 120 MMHG | BODY MASS INDEX: 26.72 KG/M2 | DIASTOLIC BLOOD PRESSURE: 80 MMHG | HEART RATE: 72 BPM | RESPIRATION RATE: 20 BRPM | WEIGHT: 197 LBS

## 2022-08-01 DIAGNOSIS — I63.9 CEREBROVASCULAR ACCIDENT (CVA), UNSPECIFIED MECHANISM (H): Primary | ICD-10-CM

## 2022-08-01 DIAGNOSIS — Q21.12 PFO (PATENT FORAMEN OVALE): ICD-10-CM

## 2022-08-01 DIAGNOSIS — Z86.69 HISTORY OF MIGRAINE: ICD-10-CM

## 2022-08-01 PROCEDURE — 99213 OFFICE O/P EST LOW 20 MIN: CPT | Performed by: FAMILY MEDICINE

## 2022-08-01 RX ORDER — CLOPIDOGREL BISULFATE 75 MG/1
75 TABLET ORAL DAILY
COMMUNITY
End: 2022-08-17

## 2022-08-01 RX ORDER — ATORVASTATIN CALCIUM 80 MG/1
80 TABLET, FILM COATED ORAL DAILY
COMMUNITY
End: 2023-06-05

## 2022-08-01 RX ORDER — ASPIRIN 81 MG/1
81 TABLET, CHEWABLE ORAL DAILY
COMMUNITY
End: 2024-06-03

## 2022-08-01 NOTE — PROGRESS NOTES
Assessment & Plan     Cerebrovascular accident (CVA)  History of migraine  PFO (patent foramen ovale)  - Per chart review, due to h/o CVA Imitrex was added as an allergy in hospital.  - Per chart review, TTE with positive bubble, DAWIT suggestive of tiny PFO, per neuro/cards, no intervention and rec ASA.  - Complete Plavix course  - continue ASA, statin, repeat lipid panel in 3 months  - Zaid will schedule with neurologist   - Form filled for [place] water, per neurology no restrictions         Deabundioa Alfred Camacho MD  [facility]    Modesto Mar is a 46 year old accompanied by his alone, presenting for the following health issues:  No chief complaint on file.      [facility]       Hospital Follow-up Visit:    Hospital/Nursing Home/IP Rehab Facility: [facility]  Date of Admission: 7/19/22  Date of Discharge: 7/22/22  Reason(s) for Admission: stroke    Was your hospitalization related to COVID-19? No   Problems taking medications regularly:  None  Medication changes since discharge: atorvastin/plavix/asa  Problems adhering to non-medication therapy:  None    Summary of hospitalization:  CareEverywhere information obtained and reviewed  Diagnostic Tests/Treatments reviewed.  Follow up needed: neurology   Other Healthcare Providers Involved in Patient s Care:         None  Update since discharge: stable.       Post Medication Reconciliation Status:        Plan of care communicated with patient           Ela Gan MD - 07/22/2022 6:55 PM CDT  Formatting of this note is different from the original.  [facility]  Discharge Summary    Admit date: 7/19/2022 9:18 AM  Discharge date:7/22/2022  Date of Service: 7/22/2022   Service: General Medicine  Staff MD: Ela Gan     Follow-Up Care  Labs and Imaging tests done in the hospital with results pending at this time: None  Labs and Imaging tests which should be done or considered in the outpatient setting: None  - Follow up with  neurology  - Follow up with PCP     Final diagnoses (primary and secondary):  Principal Problem:  Acute cerebrovascular accident of cerebellum (HRC)  .     Problem that led to hospitalization:   46 y.o. male otherwise healthy who presents with a headache that was associated with word-finding difficulty and right-sided clumsiness who is found to have an acute cerebellar CVA.    Hospital Course  CT angio head/neck: IMPRESSION:  HEAD CT:  1. No finding for intracranial hemorrhage or mass. No convincing finding for acute infarct. Areas of low-attenuation within the ventral tomi and anterior left temporal lobe are compatible with beam hardening artifact. No hyperdense vessel sign.  HEAD CTA:  1. Congenital variation of the [place] without vascular cutoff, aneurysm, or flow-limiting stenosis.  NECK CTA:  1. No significant stenosis either cervical carotid or vertebral system. No findings for dissection.    MRI Brain  IMPRESSION:  MRI BRAIN: 4.3 x 1.0 x 2.0 cm area of restricted diffusion is seen within the superior aspect of the right cerebellar hemisphere compatible with an area of acute or subacute ischemia. No finding for hemorrhagic transformation.  MRA [place] OF [place]: Congenital variation of the [place] of [place] with vascular cutoff or aneurysm. Small caliber basilar tip and small right and diminutive left P1 segments of the posterior cerebral arteries with flow to the posterior cerebral artery is supplied via the posterior communicating arteries.  Cerebellar ischemia discussed with Dr. Ramos at time of imaging.    TTE  Summary  1. The left ventricular size is normal. Systolic function is normal.   The  estimated ejection fraction is 60-65%. Wall thickness is mildly increased.  Left ventricular segmental wall motion is normal.  2. The right ventricular size is normal. Systolic function is normal.  3. No significant valvular abnormalities.  4. The aortic root measures 4.00 cm with an index of 1.87 cm/m2,  "mildly  dilated.  5. Positive bubble study with Valsalva (small amount of microbubbles pass  right to left)--difficult to determine origin of right to left shunt based  on  images.    Brief hospital course by problem:    Acute right cerebellar CVA  Code CVA called. Not tPA candidate due to low NIH, cerebellar. Symptoms include headache (improved), dysarthria and right-sided dysmetria. Non-smoker. Otherwise generally healthy. Mother had CVA as well as PFO. TTE with positive bubble, DAWIT suggestive of tiny PFO, per neuro/cards, no intervention and rec ASA. Repeat head imaging stable  - ASA, statin  - Follow up in 4-6 weeks with neurology     Thrombocytopenia  Mild, no report of bleeding, stable      Patient seen and examined today.   Pertinent discharge exam findings:   Vital signs: /85  Pulse (!) 55  Temp 98.2  F (36.8  C) (Oral)  Resp 20  Ht 5' 11.5\" (1.816 m)  Wt 89.8 kg (198 lb)  SpO2 98%  BMI 27.23 kg/m    General: Alert, NAD  HEENT: Normocephalic, no trauma. Mucous membranes moist.   Lungs: Good airflow bilaterally  Heart: Normal rate, regular rhythm. No murmurs.   Abdomen: Bowel sounds present, soft, no tenderness.   Extremities: Warm, well perfused, pulses brisk.   Neuro: Non focal    Discharge Information  Condition at discharge:stable.  Discharge destination:home.  Medications at discharge:    Medication List       START taking these medications     aspirin 81 MG chewable tablet  Chew and swallow 1 Tablet (81 mg) by mouth daily. Indications: CVA    atorvastatin 80 MG tablet  Commonly known as: LIPITOR  Take 1 Tablet (80 mg) by mouth every evening. Indications: Cerebrovascular Accident or Stroke    clopidogrel 75 MG tablet  Commonly known as: PLAVIX  Take 1 Tablet (75 mg) by mouth daily for 21 days. Indications: Cerebrovascular Accident or Stroke      Update -   He has a little bit of speech issues.   He has mild coordination with his right hand.         Review of Systems         Objective  "   There were no vitals taken for this visit.  There is no height or weight on file to calculate BMI.  Physical Exam                       .  ..

## 2022-08-01 NOTE — TELEPHONE ENCOUNTER
Dr. Camacho-Please review and advise:  1. Writer notes you saw patient today  2. Patient is requesting either increased dose or quantity for Tadalafil   A. Increased dose pended    Thank you!  KATHERYN JonesN, RN  [facility]

## 2022-08-02 NOTE — TELEPHONE ENCOUNTER
Writer responded via [facility].    KATHERYN JonesN, RN  [facility]

## 2022-08-05 RX ORDER — TADALAFIL 10 MG/1
10 TABLET ORAL DAILY PRN
Qty: 24 TABLET | Refills: 3 | Status: CANCELLED | OUTPATIENT
Start: 2022-08-05

## 2022-08-22 NOTE — TELEPHONE ENCOUNTER
RECORDS RECEIVED FROM: internal, external   REASON FOR VISIT: stroke   Date of Appt: 11.29.22   NOTES (FOR ALL VISITS) STATUS DETAILS   OFFICE NOTE from referring provider     OFFICE NOTE from other specialist Internal 8.1.22 ANGELA Camacho    DISCHARGE SUMMARY from hospital Care Everywhere 7.19.22    DISCHARGE REPORT from the ER Care Everywhere    OPERATIVE REPORT     MEDICATION LIST Internal    IMAGING  (FOR ALL VISITS)     EMG     EEG     LUMBAR PUNCTURE     JAYME SCAN     ULTRASOUND (CAROTID BILAT) *VASCULAR*     MRI (HEAD, NECK, SPINE) PACS   7.19.22 brain   CT (HEAD, NECK, SPINE) PACS [facility]  3.7.22 cervical spine      7.22.22 head  7.21.22 head  7.19.22 angio head

## 2022-09-25 ENCOUNTER — HEALTH MAINTENANCE LETTER (OUTPATIENT)
Age: 47
End: 2022-09-25

## 2022-10-01 ENCOUNTER — NURSE TRIAGE (OUTPATIENT)
Dept: NURSING | Facility: CLINIC | Age: 47
End: 2022-10-01

## 2022-10-01 NOTE — TELEPHONE ENCOUNTER
"Patient is calling and is traveling and is currently in [facility] and took a covid test this morning and is positive.  Patient states that he has a history of a stroke.    Today headache, stuffy nose, fever, joint aches.  Patient is currently wearing an Holter monitor for a fib.  Denies triage.  Symptoms started on 09/30/2022.      Coronavirus (COVID-19) Notification    Caller Name (Patient, parent, daughter/son, grandparent, etc)  Zaid Xie    Reason for call  Notify of Positive Coronavirus (COVID-19) lab results, assess symptoms,  review [facility] recommendations    Lab Result    Lab test:  2019-nCoV rRt-PCR or SARS-CoV-2 PCR    Oropharyngeal AND/OR nasopharyngeal swabs is POSITIVE for 2019-nCoV RNA/SARS-COV-2 PCR (COVID-19 virus)    Brief introduction script  Introduce self then review script:  \"I am calling on behalf of [employer].  We were notified that your Coronavirus test (COVID-19) for was POSITIVE for the virus.\"    Gather patient reported symptoms   Assessment   Current Symptoms at time of phone call, reported by patient: (if no symptoms, document No symptoms]  headache, stuffy nose, fever, joint aches.    Date of Symptom(s) onset (if applicable) 09/30/2022     If at time of call, Patients symptoms hare worsened, the Patient should contact 911 or have someone drive them to Emergency Dept promptly:      If Patient calling 911, inform 911 personal that you have tested positive for the Coronavirus (COVID-19).  Place mask on and await 911 to arrive.    If Emergency Dept, If possible, please have another adult drive you to the Emergency Dept but you need to wear mask when in contact with other people.      Monoclonal Antibody Administration    You may be eligible to receive a new treatment with a monoclonal antibody for preventing hospitalization in patients at high risk for complications from COVID-19. This medication is still experimental and available on a limited basis; it is given " through an IV and must be given at an infusion center. Please note that not all people who are eligible will receive the medication since it is in limited supply.  Is the patient symptomatic and onset of symptoms within the last 7 days?  Yes  Is the patient interested in a visit with a provider to discuss treatment options?: Yes  Is the patient seen at [facility]?  No: Warm transfer caller to [phone] to be scheduled with a virtual urgent provider.  During transfer, instruct  on appropriate time frame for visit     Review information with Patient    Your result was positive. This means you have COVID-19 (coronavirus).      How can I protect others?    These guidelines are for isolating before returning to work, school or .       If you DO have symptoms:  o Stay home and away from others  - For at least 5 days after your symptoms started, AND   - You are fever free for 24 hours (with no medicine that reduces fever), AND  - Your other symptoms are better.  o Wear a mask for 10 full days any time you are around others.    If you DON'T have symptoms:  o Stay at home and away from others for at least 5 days after your positive test.  o Wear a mask for 10 full days any time you are around others.    There may be different guidelines for healthcare facilities. Please check with the specific sites before arriving.     If you've been told by a doctor that you were severely ill with COVID-19 or are immunocompromised, you should isolate for at least 10 days.    You should not go back to work until you meet the guidelines above for ending your home isolation. You don't need to be retested for COVID-19 before going back to work--studies show that you won't spread the virus if it's been at least 10 days since your symptoms started (or 20 days, if you have a weak immune system).    Employers, schools, and daycares: This is an official notice for this person's medical guidelines for returning  in-person. They must meet the above guidelines before going back to work, school, or  in person.    You will receive a positive COVID-19 letter via [employer] or the mail soon with additional self-care information.      Would you like me to review some of that information with you now?  Yes    How can I take care of myself?      Get lots of rest. Drink extra fluids (unless a doctor has told you not to).      Take Tylenol (acetaminophen) for fever or pain. If you have liver or kidney problems, ask your family doctor if it's okay to take Tylenol.     Take either:     650 mg (two 325 mg pills) every 4 to 6 hours, or     1,000 mg (two 500 mg pills) every 8 hours as needed.     Note: Do not take more than 3,000 mg in one day. Acetaminophen is found in many medicines (both prescribed and over-the-counter medicines). Read all labels to be sure you don't take too much.    For children, check the Tylenol bottle for the right dose (based on their age or weight).      If you have other health problems (like cancer, heart failure, an organ transplant or severe kidney disease): Call your specialty clinic if you don't feel better in the next 2 days.      Know when to call 911: Emergency warning signs include:    Trouble breathing or shortness of breath    Pain or pressure in the chest that doesn't go away    Feeling confused like you haven't felt before, or not being able to wake up    Bluish-colored lips or face        If you were tested for an upcoming procedure, please contact your provider for next steps.     Ladonna Siddiqi RN    Reason for Disposition    Health Information question, no triage required and triager able to answer question    Additional Information    Negative: RN needs further essential information from caller in order to complete triage    Negative: Requesting regular office appointment    Negative: [1] Caller requesting NON-URGENT health information AND [2] PCP's office is the best resource    Protocols  used: INFORMATION ONLY CALL - NO TRIAGE-A-AH

## 2022-10-17 ENCOUNTER — NURSE TRIAGE (OUTPATIENT)
Dept: FAMILY MEDICINE | Facility: CLINIC | Age: 47
End: 2022-10-17

## 2022-10-17 NOTE — TELEPHONE ENCOUNTER
Additional Information    Negative: SEVERE difficulty breathing (e.g., struggling for each breath, speaks in single words)    Negative: Difficult to awaken or acting confused (e.g., disoriented, slurred speech)    Negative: Bluish (or gray) lips or face now    Negative: Shock suspected (e.g., cold/pale/clammy skin, too weak to stand, low BP, rapid pulse)    Negative: Sounds like a life-threatening emergency to the triager    Negative: [1] Diagnosed or suspected COVID-19 AND [2] symptoms lasting 3 or more weeks    Negative: [1] COVID-19 exposure AND [2] no symptoms    Negative: COVID-19 vaccine reaction suspected (e.g., fever, headache, muscle aches) occurring 1 to 3 days after getting vaccine    Negative: COVID-19 vaccine, questions about    Negative: [1] Lives with someone known to have influenza (flu test positive) AND [2] flu-like symptoms (e.g., cough, runny nose, sore throat, SOB; with or without fever)    Negative: [1] Adult with possible COVID-19 symptoms AND [2] triager concerned about severity of symptoms or other causes    Negative: COVID-19 and breastfeeding, questions about    Negative: SEVERE or constant chest pain or pressure  (Exception: Mild central chest pain, present only when coughing.)    Negative: MODERATE difficulty breathing (e.g., speaks in phrases, SOB even at rest, pulse 100-120)    Negative: Headache and stiff neck (can't touch chin to chest)    Negative: Oxygen level (e.g., pulse oximetry) 90 percent or lower    Negative: Chest pain or pressure  (Exception: MILD central chest pain, present only when coughing)    Negative: Patient sounds very sick or weak to the triager    Negative: MILD difficulty breathing (e.g., minimal/no SOB at rest, SOB with walking, pulse <100)    Negative: Fever > 103 F (39.4 C)    Negative: [1] Fever > 101 F (38.3 C) AND [2] over 60 years of age    Negative: [1] Fever > 100.0 F (37.8 C) AND [2] bedridden (e.g., nursing home patient, CVA, chronic illness,  recovering from surgery)    Negative: [1] HIGH RISK for severe COVID complications (e.g., weak immune system, age > 64 years, obesity with BMI of 30 or higher, pregnant, chronic lung disease or other chronic medical condition) AND [2] COVID symptoms (e.g., cough, fever)  (Exceptions: Already seen by PCP and no new or worsening symptoms.)    Negative: [1] HIGH RISK patient AND [2] influenza is widespread in the community AND [3] ONE OR MORE respiratory symptoms: cough, sore throat, runny or stuffy nose    Negative: [1] HIGH RISK patient AND [2] influenza exposure within the last 7 days AND [3] ONE OR MORE respiratory symptoms: cough, sore throat, runny or stuffy nose    Negative: Oxygen level (e.g., pulse oximetry) 91 to 94 percent    Negative: [1] COVID-19 infection suspected by caller or triager AND [2] mild symptoms (cough, fever, or others) AND [3] negative COVID-19 rapid test    Negative: Fever present > 3 days (72 hours)    Negative: [1] Fever returns after gone for over 24 hours AND [2] symptoms worse or not improved    Negative: [1] Continuous (nonstop) coughing interferes with work or school AND [2] no improvement using cough treatment per Care Advice    Negative: Cough present > 3 weeks    Negative: [1] COVID-19 diagnosed by positive lab test (e.g., PCR, rapid self-test kit) AND [2] NO symptoms (e.g., cough, fever, others)    Negative: [1] COVID-19 diagnosed by positive lab test (e.g., PCR, rapid self-test kit) AND [2] mild symptoms (e.g., cough, fever, others) AND [3] no complications or SOB    Negative: [1] COVID-19 diagnosed by doctor (or NP/PA) AND [2] mild symptoms (e.g., cough, fever, others) AND [3] no complications or SOB    Negative: [1] COVID-19 diagnosed AND [2] has mild nausea, vomiting or diarrhea    Negative: [1] COVID-19 infection suspected by caller or triager AND [2] mild symptoms (cough, fever, or others) AND [3] has not gotten tested yet    Negative: COVID-19 Home Isolation, questions  "about    Negative: COVID-19 Testing, questions about    Negative: COVID-19 Prevention and Healthy Living, questions about    Negative: COVID-19 Disease, questions about    Answer Assessment - Initial Assessment Questions  1. COVID-19 DIAGNOSIS: \"Who made your COVID-19 diagnosis?\" \"Was it confirmed by a positive lab test or self-test?\" If not diagnosed by a doctor (or NP/PA), ask \"Are there lots of cases (community spread) where you live?\" Note: See public health department website, if unsure.      Positive on 10/1 and was given paxlovid on 10/1  Negative on 10/4 and 10/6  Positive again today and yesterday    2. COVID-19 EXPOSURE: \"Was there any known exposure to COVID before the symptoms began?\" CDC Definition of close contact: within 6 feet (2 meters) for a total of 15 minutes or more over a 24-hour period.       NA  3. ONSET: \"When did the COVID-19 symptoms start?\"     10/1  4. WORST SYMPTOM: \"What is your worst symptom?\" (e.g., cough, fever, shortness of breath, muscle aches)      Now some nasal congestion and a little cough, no fever, no aches  5. COUGH: \"Do you have a cough?\" If Yes, ask: \"How bad is the cough?\"     slight cough now since finishing the paxlovid, and runny nose since then for last few days  6. FEVER: \"Do you have a fever?\" If Yes, ask: \"What is your temperature, how was it measured, and when did it start?\"      no  7. RESPIRATORY STATUS: \"Describe your breathing?\" (e.g., shortness of breath, wheezing, unable to speak)       no  8. BETTER-SAME-WORSE: \"Are you getting better, staying the same or getting worse compared to yesterday?\"  If getting worse, ask, \"In what way?\"      Sx starting  9. HIGH RISK DISEASE: \"Do you have any chronic medical problems?\" (e.g., asthma, heart or lung disease, weak immune system, obesity, etc.)      Not immune sx problems, had stroke over the summer, seeing a cardiologist and will fix patent foramen ovale     14. O2 SATURATION MONITOR:  \"Do you use an oxygen " "saturation monitor (pulse oximeter) at home?\" If Yes, ask \"What is your reading (oxygen level) today?\" \"What is your usual oxygen saturation reading?\" (e.g., 95%)    Doesn't have    Protocols used: CORONAVIRUS (COVID-19) DIAGNOSED OR MIHYIKPSX-M-UX      "

## 2022-10-17 NOTE — TELEPHONE ENCOUNTER
S-(situation): pt was positive for covid on 10/1 and given paxlovid then due to his hx of stroke. Improved and was testing negative  Tested postiive again yesterday (son tested positive as well yesterday)  Sx now just cold like sx, no shortness of breath, no fever, just minor sx      B-(background):he had stroke in July which cardiologist thinks from a patent foramen ovale - will likely have surgery to repair this -this is the reason he was put on the paxlovid  Not on anticoagulant now, just daily low dose asa       A-(assessment): rebound covid sx post paxlovid? New case from son?     R-(recommendations): What do you advise? I told him he probably should do a virtual visit through the [facility] link for covid care    Ruthy Butler RN

## 2022-11-17 ENCOUNTER — NURSE TRIAGE (OUTPATIENT)
Dept: NURSING | Facility: CLINIC | Age: 47
End: 2022-11-17

## 2022-11-28 ENCOUNTER — OFFICE VISIT (OUTPATIENT)
Dept: FAMILY MEDICINE | Facility: CLINIC | Age: 47
End: 2022-11-28
Payer: COMMERCIAL

## 2022-11-28 VITALS
BODY MASS INDEX: 28.06 KG/M2 | HEIGHT: 71 IN | OXYGEN SATURATION: 98 % | TEMPERATURE: 97.4 F | SYSTOLIC BLOOD PRESSURE: 100 MMHG | WEIGHT: 200.4 LBS | RESPIRATION RATE: 16 BRPM | HEART RATE: 81 BPM | DIASTOLIC BLOOD PRESSURE: 70 MMHG

## 2022-11-28 DIAGNOSIS — E66.3 OVERWEIGHT: ICD-10-CM

## 2022-11-28 DIAGNOSIS — Z23 NEED FOR PNEUMOCOCCAL VACCINE: ICD-10-CM

## 2022-11-28 DIAGNOSIS — Z01.84 IMMUNITY STATUS TESTING: ICD-10-CM

## 2022-11-28 DIAGNOSIS — L80 VITILIGO: ICD-10-CM

## 2022-11-28 DIAGNOSIS — Z13.1 SCREENING FOR DIABETES MELLITUS: ICD-10-CM

## 2022-11-28 DIAGNOSIS — D72.819 LEUKOPENIA, UNSPECIFIED TYPE: ICD-10-CM

## 2022-11-28 DIAGNOSIS — E78.1 ABNORMALLY LOW HIGH DENSITY LIPOPROTEIN (HDL) CHOLESTEROL WITH HYPERTRIGLYCERIDEMIA: ICD-10-CM

## 2022-11-28 DIAGNOSIS — Z71.89 ADVANCED DIRECTIVES, COUNSELING/DISCUSSION: ICD-10-CM

## 2022-11-28 DIAGNOSIS — Q21.12 PFO (PATENT FORAMEN OVALE): ICD-10-CM

## 2022-11-28 DIAGNOSIS — Z00.00 HEALTH CARE MAINTENANCE: ICD-10-CM

## 2022-11-28 DIAGNOSIS — I86.1 VARICOCELE: ICD-10-CM

## 2022-11-28 DIAGNOSIS — N52.9 ERECTILE DYSFUNCTION, UNSPECIFIED ERECTILE DYSFUNCTION TYPE: ICD-10-CM

## 2022-11-28 DIAGNOSIS — D22.9 MULTIPLE PIGMENTED NEVI: ICD-10-CM

## 2022-11-28 DIAGNOSIS — Z23 NEED FOR PROPHYLACTIC VACCINATION AND INOCULATION AGAINST INFLUENZA: ICD-10-CM

## 2022-11-28 DIAGNOSIS — Z12.11 COLON CANCER SCREENING: ICD-10-CM

## 2022-11-28 DIAGNOSIS — Z86.16 HISTORY OF 2019 NOVEL CORONAVIRUS DISEASE (COVID-19): ICD-10-CM

## 2022-11-28 DIAGNOSIS — Z00.00 ROUTINE HISTORY AND PHYSICAL EXAMINATION OF ADULT: Primary | ICD-10-CM

## 2022-11-28 DIAGNOSIS — R73.03 PREDIABETES: ICD-10-CM

## 2022-11-28 DIAGNOSIS — Z78.9 VEGETARIAN: ICD-10-CM

## 2022-11-28 DIAGNOSIS — Z86.73 HISTORY OF CVA (CEREBROVASCULAR ACCIDENT): ICD-10-CM

## 2022-11-28 DIAGNOSIS — Z23 NEED FOR COVID-19 VACCINE: ICD-10-CM

## 2022-11-28 DIAGNOSIS — Z86.2 HISTORY OF DECREASED PLATELET COUNT: ICD-10-CM

## 2022-11-28 DIAGNOSIS — G43.109 MIGRAINE WITH AURA AND WITHOUT STATUS MIGRAINOSUS, NOT INTRACTABLE: ICD-10-CM

## 2022-11-28 DIAGNOSIS — D17.21 LIPOMA OF RIGHT FOREARM: ICD-10-CM

## 2022-11-28 DIAGNOSIS — Z23 NEED FOR HEPATITIS B VACCINATION: ICD-10-CM

## 2022-11-28 DIAGNOSIS — E78.6 ABNORMALLY LOW HIGH DENSITY LIPOPROTEIN (HDL) CHOLESTEROL WITH HYPERTRIGLYCERIDEMIA: ICD-10-CM

## 2022-11-28 DIAGNOSIS — R03.0 ELEVATED BLOOD PRESSURE READING WITHOUT DIAGNOSIS OF HYPERTENSION: ICD-10-CM

## 2022-11-28 PROBLEM — I63.9: Status: ACTIVE | Noted: 2022-07-19

## 2022-11-28 LAB
ALBUMIN SERPL BCG-MCNC: 4.4 G/DL (ref 3.5–5.2)
ALP SERPL-CCNC: 93 U/L (ref 40–129)
ALT SERPL W P-5'-P-CCNC: 46 U/L (ref 10–50)
ANION GAP SERPL CALCULATED.3IONS-SCNC: 10 MMOL/L (ref 7–15)
AST SERPL W P-5'-P-CCNC: 27 U/L (ref 10–50)
BASOPHILS # BLD AUTO: 0 10E3/UL (ref 0–0.2)
BASOPHILS NFR BLD AUTO: 1 %
BILIRUB SERPL-MCNC: 0.6 MG/DL
BUN SERPL-MCNC: 12.2 MG/DL (ref 6–20)
CALCIUM SERPL-MCNC: 9.2 MG/DL (ref 8.6–10)
CHLORIDE SERPL-SCNC: 106 MMOL/L (ref 98–107)
CHOLEST SERPL-MCNC: 160 MG/DL
CREAT SERPL-MCNC: 0.83 MG/DL (ref 0.67–1.17)
CREAT UR-MCNC: 142 MG/DL
DEPRECATED HCO3 PLAS-SCNC: 26 MMOL/L (ref 22–29)
EOSINOPHIL # BLD AUTO: 0.3 10E3/UL (ref 0–0.7)
EOSINOPHIL NFR BLD AUTO: 6 %
ERYTHROCYTE [DISTWIDTH] IN BLOOD BY AUTOMATED COUNT: 11.8 % (ref 10–15)
GFR SERPL CREATININE-BSD FRML MDRD: >90 ML/MIN/1.73M2
GLUCOSE SERPL-MCNC: 106 MG/DL (ref 70–99)
HBA1C MFR BLD: 5.8 % (ref 0–5.6)
HBV SURFACE AB SERPL IA-ACNC: 20.68 M[IU]/ML
HBV SURFACE AB SERPL IA-ACNC: REACTIVE M[IU]/ML
HBV SURFACE AG SERPL QL IA: NONREACTIVE
HCT VFR BLD AUTO: 40.7 % (ref 40–53)
HDLC SERPL-MCNC: 35 MG/DL
HGB BLD-MCNC: 13.9 G/DL (ref 13.3–17.7)
IMM GRANULOCYTES # BLD: 0 10E3/UL
IMM GRANULOCYTES NFR BLD: 0 %
LDLC SERPL CALC-MCNC: 95 MG/DL
LYMPHOCYTES # BLD AUTO: 1.6 10E3/UL (ref 0.8–5.3)
LYMPHOCYTES NFR BLD AUTO: 34 %
MCH RBC QN AUTO: 30.8 PG (ref 26.5–33)
MCHC RBC AUTO-ENTMCNC: 34.2 G/DL (ref 31.5–36.5)
MCV RBC AUTO: 90 FL (ref 78–100)
MICROALBUMIN UR-MCNC: <12 MG/L
MICROALBUMIN/CREAT UR: NORMAL MG/G{CREAT}
MONOCYTES # BLD AUTO: 0.4 10E3/UL (ref 0–1.3)
MONOCYTES NFR BLD AUTO: 9 %
NEUTROPHILS # BLD AUTO: 2.4 10E3/UL (ref 1.6–8.3)
NEUTROPHILS NFR BLD AUTO: 51 %
NONHDLC SERPL-MCNC: 125 MG/DL
PLATELET # BLD AUTO: 173 10E3/UL (ref 150–450)
POTASSIUM SERPL-SCNC: 4.5 MMOL/L (ref 3.4–5.3)
PROT SERPL-MCNC: 7.3 G/DL (ref 6.4–8.3)
RBC # BLD AUTO: 4.52 10E6/UL (ref 4.4–5.9)
SODIUM SERPL-SCNC: 142 MMOL/L (ref 136–145)
TRIGL SERPL-MCNC: 151 MG/DL
TSH SERPL DL<=0.005 MIU/L-ACNC: 2.02 UIU/ML (ref 0.3–4.2)
VIT B12 SERPL-MCNC: 544 PG/ML (ref 232–1245)
WBC # BLD AUTO: 4.7 10E3/UL (ref 4–11)

## 2022-11-28 PROCEDURE — 87340 HEPATITIS B SURFACE AG IA: CPT | Performed by: FAMILY MEDICINE

## 2022-11-28 PROCEDURE — 91313 COVID-19 VACCINE BIVALENT BOOSTER 18+ (MODERNA): CPT | Performed by: FAMILY MEDICINE

## 2022-11-28 PROCEDURE — 86706 HEP B SURFACE ANTIBODY: CPT | Performed by: FAMILY MEDICINE

## 2022-11-28 PROCEDURE — 80050 GENERAL HEALTH PANEL: CPT | Performed by: FAMILY MEDICINE

## 2022-11-28 PROCEDURE — 36415 COLL VENOUS BLD VENIPUNCTURE: CPT | Performed by: FAMILY MEDICINE

## 2022-11-28 PROCEDURE — 80061 LIPID PANEL: CPT | Performed by: FAMILY MEDICINE

## 2022-11-28 PROCEDURE — 0134A COVID-19 VACCINE BIVALENT BOOSTER 18+ (MODERNA): CPT | Performed by: FAMILY MEDICINE

## 2022-11-28 PROCEDURE — 90686 IIV4 VACC NO PRSV 0.5 ML IM: CPT | Performed by: FAMILY MEDICINE

## 2022-11-28 PROCEDURE — 99214 OFFICE O/P EST MOD 30 MIN: CPT | Mod: 25 | Performed by: FAMILY MEDICINE

## 2022-11-28 PROCEDURE — 90471 IMMUNIZATION ADMIN: CPT | Performed by: FAMILY MEDICINE

## 2022-11-28 PROCEDURE — 82043 UR ALBUMIN QUANTITATIVE: CPT | Performed by: FAMILY MEDICINE

## 2022-11-28 PROCEDURE — 99396 PREV VISIT EST AGE 40-64: CPT | Mod: 25 | Performed by: FAMILY MEDICINE

## 2022-11-28 PROCEDURE — 82607 VITAMIN B-12: CPT | Performed by: FAMILY MEDICINE

## 2022-11-28 PROCEDURE — 83036 HEMOGLOBIN GLYCOSYLATED A1C: CPT | Performed by: FAMILY MEDICINE

## 2022-11-28 RX ORDER — TADALAFIL 5 MG/1
5 TABLET ORAL DAILY PRN
Qty: 12 TABLET | Refills: 11 | Status: SHIPPED | OUTPATIENT
Start: 2022-11-28 | End: 2023-12-26

## 2022-11-28 ASSESSMENT — ENCOUNTER SYMPTOMS
DIARRHEA: 0
NERVOUS/ANXIOUS: 0
HEMATURIA: 0
FREQUENCY: 0
SHORTNESS OF BREATH: 0
JOINT SWELLING: 0
COUGH: 0
CONSTIPATION: 0
ABDOMINAL PAIN: 0
SORE THROAT: 0
EYE PAIN: 0
HEARTBURN: 0
HEMATOCHEZIA: 0
PARESTHESIAS: 0
HEADACHES: 0
DYSURIA: 0
WEAKNESS: 0
MYALGIAS: 0
DIZZINESS: 0
ARTHRALGIAS: 0
CHILLS: 0
PALPITATIONS: 0
FEVER: 0
NAUSEA: 0

## 2022-11-28 NOTE — RESULT ENCOUNTER NOTE
Kuldeep Mr. Xie,  Your results came back and microalbumin is normal.  Triglycerides 151 similar to prior.  HDL is low similar to prior.  LDL is 95 ideally if you have had a stroke recommendation is to keep the LDL less than 70.  Currently you are on atorvastatin 80 mg and LDL is 95.  If we lower the dose or take you off it completely then it may go higher.  But he also mentioned that you had not been taking medication recently so not sure if it is elevated now because you had not been taking it.  It would be a good idea if you resume the atorvastatin 80 mg bedtime and recheck your cholesterol in 3 months on this dose and if LDL is less than 70 at that time then we can cut back on the dose and recheck after that and adjust as needed.  You may also visit this issue with the neurologist and cardiologist at the [facility] to get their opinion.  Let me know what route you would like to take.  If you have any further concerns please do not hesitate to contact us by message, phone or making an appointment.  Have a good day   Dr Haresh GUERRERO

## 2022-11-28 NOTE — RESULT ENCOUNTER NOTE
Kuldeep Mr. Xie,  Your results came back and results indicate you are immune to hepatitis B likely from prior vaccination.  If you can find your hepatitis B vaccination record and we can update it in your epic chart   if you have any further concerns please do not hesitate to contact us by message, phone or making an appointment.  Have a good day   Dr Haresh GUERRERO

## 2022-11-28 NOTE — RESULT ENCOUNTER NOTE
Kuldeep Cortesyder,  Some of your results came back showing  Hemoglobin A1c in prediabetic range of 5.8.  Recommend lowering carb intake and some more weight loss and staying active and rechecking HBA1c in 6 months  White count hemoglobin and platelets are all normal this time   If you have any further concerns please do not hesitate to contact us by message, phone or making an appointment.  Have a good day   Dr Haresh GUERRERO

## 2022-11-28 NOTE — PATIENT INSTRUCTIONS
Seen for preventive health and additional concerns today   Self testicular check regularly   Labs today and will make further recommendations once reviewed  History of right cerebellar [facility] with resolved word finding difficulty and right-sided clumsiness improved.  Writing not back to baseline but improved from before.  Seen by neurology and interventional neurologist at the [facility] post stroke.  No known hypercoagulable state found.  Assessed to be cryptogenic.  PFO found s/p closure November 14 at the [facility].  Currently doing well.  Follow-up with cardiology as planned may discuss loop recorder with them.  Continue aspirin 81 mg daily.  Recommend continuing statin may be not at 80 mg dosing we will see what her lipids show and go from there.  History of historically low HDL and hypertriglyceridemia likely genetic.  But statins will decrease endothelial damage and cardiovascular risk.  Blood pressure is looking improved.  History of migraines with visual aura avoid all triptans given stroke history.  Vegetarian continue with B complex we will check B12 level today.  Weight has improved continue to work on eating healthy and BMI in a healthy range.  History of historically low platelets and white cell count we will recheck labs today.  Lipoma right forearm and multiple pigmented nevi continue care with dermatology.  History of vitiligo unchanged follow-up with dermatology as planned.  Urticaria of unknown cause ruled out for vasculitis in April 2022 by dermatology  History of COVID s/p Paxlovid in October with rebound COVID symptoms now resolved.  Varicocele unchanged and asymptomatic.  ED managed with Cialis 5 mg as needed #12 given with 11 refills.  Currently    Healthcare maintenance reviewed.  Has ACP at home and on file.  Colon cancer screening due in 2031.  Flu shot given today.  Moderna by Valent booster given today.  Consider Prevnar 20 given history of PFO.  Consider  hepatitis B vaccination if immune testing today shows you are not immune  If traveling outside the country recommend being seen by the travel clinic.    Return in 1 year for preventive physical or sooner in an office visit for any new concerns    Preventive Health Recommendations  Male Ages 40 to 49    Yearly exam:             See your health care provider every year in order to  o   Review health changes.   o   Discuss preventive care.    o   Review your medicines if your doctor has prescribed any.  You should be tested each year for STDs (sexually transmitted diseases) if you re at risk.   Have a cholesterol test every 5 years.   Have a colonoscopy (test for colon cancer) if someone in your family has had colon cancer or polyps before age 50.   After age 45, have a diabetes test (fasting glucose). If you are at risk for diabetes, you should have this test every 3 years.    Talk with your health care provider about whether or not a prostate cancer screening test (PSA) is right for you.    Shots: Get a flu shot each year. Get a tetanus shot every 10 years.     Nutrition:  Eat at least 5 servings of fruits and vegetables daily.   Eat whole-grain bread, whole-wheat pasta and brown rice instead of white grains and rice.   Get adequate Calcium and Vitamin D.     Lifestyle  Exercise for at least 150 minutes a week (30 minutes a day, 5 days a week). This will help you control your weight and prevent disease.   Limit alcohol to one drink per day.   No smoking.   Wear sunscreen to prevent skin cancer.   See your dentist every six months for an exam and cleaning.

## 2022-11-28 NOTE — RESULT ENCOUNTER NOTE
Kuldeep Mr. Xie,  Your results came back and are within acceptable limits. -Liver and gallbladder tests (ALT,AST, Alk phos,bilirubin) are normal.  -Kidney function (GFR) is normal.  -Sodium is normal.  -Potassium is normal.  -Calcium is normal.  -Glucose is slight elevated and may be a sign of early diabetes (prediabetes). ADVISE:: eating a low carbohydrate diet, exercising, trying to lose weight (if necessary) and rechecking your glucose level in 6 months.  -TSH (thyroid stimulating hormone) level is normal which indicates normal thyroid function.  B12 is in normal range   if you have any further concerns please do not hesitate to contact us by message, phone or making an appointment.  Have a good day   Dr Haresh GUERRERO

## 2022-11-28 NOTE — PROGRESS NOTES
SUBJECTIVE:   CC: Zaid is an 47 year old who presents for preventative health visit.     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, with history of right saline pallor stroke with word finding difficulty and right-sided clumsiness in July 2022 resolved with time, negative hypercoagulable work-up, work-up did not reveal a PFO s/p closure at [facility] November 14, 2022, hx of cervical radiculitis treated with Flexeril prn in the past, hx of migraine with aura secondary to dehydration in 2017, and advised to avoid triptans due to history of stroke, elevated TG in past, low HDL, hx of resolved esophageal reflux, vitiligo noted since 2008, lipoma, left back s/p excision 4/2021, has lipoma right forearm, vitiligo, multiple pigmented nevi under care of dermatology, hx of impaired glucose, prediabetes, history of low platelets, low white count in the past, varicocele, ED on Cialis as needed, on Vit B,C, D, fish oil and lutein, prior noted ulcer with NSAID use, history of COVID, is a vegetarian, previously under care of PCP Dr Marino,   Seen by PCP 2/10/21 for left neck and shoulder pain, Dx with left cervical radiculopathy and advised PT and given Flexeril. U/S done 2/15 for large subcutaneous mass left lateral upper back showed a lipoma, referred to the surgeon, seen 3/22/21 and noted to get surgically removed under local anesthesia.  Seen first time on 4/14/2021 by this provider for a prep for lipoma excision  under local. Camp physical & preventive labs done. Addressed hx of impaired glucose, elevated TG in past, BMI, vitiligo hx and labs done showed glucose of 104, HBA1c was normal at 5.5, negative for Hep C, HIV, normal TSH, Low HDL, LDL & TG were normal, Rest of BMP normal. CBC showed stable low wbc and platelets of 145 previously 115. Reminded to work on advance directives. Was to continue routine care & preventive physical with PCP Dr Marino. Had lipoma excision left back on 4/26/21.   Seen 8/11/21 for preventive health and additional concerns.  Advised self testicular check regularly. Deferred checking labs done in April . Cbc done due to low wbc & platelets in the past. Noted had recovered from lipoma excision & coincidentally the nerve pain he'd had prior to that resolved 80 % after surgery. Felt the left neck pinched nerve was better doing some strength training, & stretching helped & declined need to see ortho. Had completed PT around same time had excision of lipoma and strangely noted improvement after lipoma excision. Noted had gone mostly vegetarian since feb, eliminated 80 percent red meat, was eating some fish and chicken. BMI down from 29 to 27. Vitiligo was unchanged, had done UVB Rx 5 yrs prior & seen derm also for hx of prior atypical nevi on Bx. Due for a follow up & was to schedule with derm on his own. Hx of varicocele not bothering him, dx in high school, dad of three. Had a Lipoma right forearm not bothering him, unchanged. Was to monitor. Everyone in family vaccinated against Covid. Hx of vit d def in past, took in the winter time. Health care maintenance reviewed. Was to follow up with the dentist. For colon cancer screening, colonoscopy ordered.   Vitamin D came back low ferritin and B12 normal and advise increase vitamin D in diet.  WBC and platelets were low but stable to prior and possibly his new baseline.  Colonoscopy done 11/29/2021 was normal and advised to recheck in 10 years.  Call to  get checked for testosterone 12/8/2021 request sent in and this came back normal.  On 2/9 communicated via [employer] about numbness and tingling right hand 3 to 5 days.  Referred to orthopedics and physical therapy.  Opted to hold off on physical therapy seen by neurosurgeon on 2/14/2022 MRI C-spine advised to consider C-spine traction.  [place]  oxycodone 5 mg # 10 on 4/26/21    Seen 3/7/22 for numbness tingling right hand, right arm pain, hx of cervical radiculopathy & right upper back pain between scapula and spine that started after episode of snowboarding in December and had persisted, seen by neurosurgeon and MRI C-spine to be done  For ED noted testosterone in the fall had been normal.  Vitamin D was normal, B12, CMP normal other than elevated glucose TSH was normal.  Hemoglobin A1c in prediabetic range.  CBC showed chronically low platelets and white count not different from before.  Felt stress and age-related.  Urology referral given.  Discussed options and felt Cialis covered by insurance.  Prescribed to take 5 mg half an hour prior to sexual activity as needed.  May be effective for 36 hours after single dose.  Do not split dose.  Counseled about risk of flushing, headache, sinus infection, priapism. Avoid any alpha blockers on this medication.  If should have chest pain or EMS comes to the house or presents to ER needs to disclose if used this medication in the prior 48 hours to prevent being given meds that may cause drop in blood pressure.  Given number 12 tablets to try if not helpful could consider next dose up.   Was to continue care with dermatology regarding vitiligo.  Continue with sun protection. Check vitamin D and CBC given prior history of low white count platelets and vitamin D.  Had an elevated blood pressure.  Recheck was borderline.  This was new. Felt related to stress and pain.  To check at home and if consistently over 130 x 80 to give us a call.  Decrease salt in diet, limit alcohol to 1  drink in a 24 hr time frame.  No more than 7 total a week, avoid anti-inflammatories as much as possible.  Limit caffeine intake.  Stay active and work on losing a few pounds.  Recheck at upcoming physical  Due to elevated blood pressure, low HDL elevated triglycerides, BMI 28, family history of heart disease discussed and referred to preventive cardiology for evaluation.  Last physical noted  done in August.  But needed a camp physical done in May and June so could make a big hiking camping trip in July.     MRI C-spine done 3/7/2022 showed multilevel cervical spondylosis mostly at C6-7 with moderate to severe neural foraminal stenosis without evidence of spinal stenosis.  Referred to PT did traction and that helped and was discharged from physical therapy/11/22.  On 4/26/2022 preparticipation camp physical form filled based on 2021 physical visit.    On 7/22/2022 seen at [facility] ER for word finding difficulty right-sided clumsiness and work-up revealed acute cerebellar right-sided CVA and TTE showed positive bubble study showing a PFO with a negative hypercoagulable work-up.  Seen at [facility] and ultimately evaluated to have a cryptogenic stroke with negative hypercoagulable work-up, negative labs, negative Dopplers, a moderate sized PFO and was referred to interventional cardiology and had PFO closure done November 14 at [facility].  In between also had COVID October 1, 2022 treated with Paxil with and had a rebound when that resolved on its own.    CURRENTLY  Here for a physical    Right sided tingling and numbness from from last spring has since resolved with physical therapy and cervical traction after MRI showed some cervical stenosis and symptoms have not recurred with that regard.    Low HDL elevated triglycerides family history of heart concern with BMI more than 28 and was referred to preventive cardiology in the spring but did not get this  .  Since seen noted on July 22, woke up with a headache,  initially thought similar to migraines he has had in the past about 4-5 a year.  Never had a debilitating migraine in over 10 years but felt it was going to be debilitating enough that he canceled his clinic schedule and was at home.  While taking his breakfast into the kitchen he suddenly felt a strange lack of coordination right side hand and leg.  His wife happened to be working from home when he called out to her and he felt something was wrong as he could not get his mouth to function right.  Due to the fast at home he had no facial asymmetry etc. but decided to go to [facility] ER initially CT did not show anything but an MRI showed an acute right cerebellar stroke.  He was admitted to the hospital where he was evaluated by neurology, therapies hypercoagulable work-up was negative he progressed daily.  Echo done showed possible PFO was advised a transesophageal echo which showed a small opening and no recommendations came after that.  He was 95% normal by the time he was discharged home 5 days later.  He was advised to see a stroke specialist but could not get home and to [facility] a [facility] time and an appointment with [facility] was not till November so he was able to be seen by neurovascular specialist at [facility] who repeated his transthoracic echo as well as some labs and an ultrasound that ruled out DVT.  His hypercoagulable work-up was assessed to be negative a Holter done for 2 weeks was negative for A. fib.  The transthoracic echo at [facility] showed moderate PFO end of September and was forwarded to interventional cardiology who told him his PFO was large and recommended to close it.  He had a closure procedure on 14 November  He is suspected to have a thrombotic event cryptogenic stroke.  Interestingly his mother had a stroke at age 56 and also had a PFO fixed in her 50's in 1999.  Not sure there is a genetic component.  His brother did get tested after he had a stroke and was negative.   He has been taking aspirin about 6 days of the week and not use much statin since started first time in July    He has been on aspirin daily completed Plavix after 21 days.  He was also scheduled on atorvastatin 80 mg bedtime and this appointment today was originally scheduled to reassess atorvastatin.  He has not been good about taking it every day specially after he held it when he was on Paxil made in October.  He wonders if he needs to be on it and if on such a high dose as his HDL though low with elevated triglycerides his LDL has always been less than 100.  PFO was closed through a groin angiogram access.  Has had no bruising or bleeding at the site more than usual.    About 2 weeks ago he had chills and tachypnea seen in [facility] ER ruled out for PE A. fib infection and discharged home not sure what it was related to we did speak to the surgeon at [facility] and they are not sure what was the cause.  He was monitored and sent home and his EKG monitoring on his "[employer] zach on his watch was normal as well and has been feeling fine since then.  He reports no current cardiac symptoms.    His blood pressure has been normal all along since last seen.    History of migraines with aura visual aura in the past always associated usually with dehydration never debilitating has only had 2 debilitating migraines in his lifetime.  At most will get mild migraines 4-5 times a year.  Has not used Imitrex in the past and since he had a stroke was advised not to use any triptans in the future.  He was told that some studies show that closure of PFO can decrease migraines as well.    BMI is 27 feels down 15 pounds since 2021 working on eating healthy and remains vegetarian 80% of the time.    History of low platelets and low white count in the past chronically has had no bruising or bleeding other than bruising noted post PFO closure when on Plavix 1 week and post stroke when on Plavix for 21 days.     Lipoma right forearm not bothering  him    Vitiligo unchanged elbows forearms pubic area.  Has had testing in the past did not reveal any other autoimmune condition.    Multiple pigmented nevi does see dermatology in fact saw dermatology consultants sometime in the spring for urticaria 4 days of unclear cause.  Initially thought might have had vasculitis but biopsy revealed no problems, ultimately told it was due to virus and it resolved on its own.    History of COVID mild given Paxil with due to stroke history and when he completed that he had rebound COVID but recovered from that as well in October 2022.    Varicocele currently asymptomatic.    ED on Cialis as needed no side effects understands risk declines need to see urology.  Reports told not a problem to use per cardiology and neurology     reviewed  Has ACP on file  Colonoscopy due 2031, 10/20/2021 and was normal  Vaccines discussed, due for flu shot, COVID booster, pneumonia vaccine and hepatitis B if not immune.  Will get flu shot today.  Will do Prevnar another day.  Has had Pfizer vaccine series before desires to get Moderna COVID bivalent today.  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    Today's PHQ-2 Score:   PHQ-2 ( 1999 Pfizer) 11/28/2022   Q1: Little interest or pleasure in doing things 0   Q2: Feeling down, depressed or hopeless 0   PHQ-2 Score 0   PHQ-2 Total Score (12-17 Years)- Positive if 3 or more points; Administer PHQ-A if positive -   Q1: Little interest or pleasure in doing things Not at all   Q2: Feeling down, depressed or hopeless Not at all   PHQ-2 Score 0     Social History     Tobacco Use     Smoking status: Never     Smokeless tobacco: Never   Substance Use Topics     Alcohol use: Yes     Comment: 5 dirnks a week      If you drink alcohol do you typically have >3 drinks per day or >7 drinks per week?  No    Alcohol Use 2022   Prescreen: >3 drinks/day or >7 drinks/week? No   Prescreen: >3 drinks/day or >7 drinks/week? -     Last PSA: No results found for: PSA    Reviewed orders with patient. Reviewed health maintenance and updated orders accordingly - Yes  Lab work is in process  Labs reviewed in EPIC  BP Readings from Last 3 Encounters:   22 100/70   22 120/80   22 (!) 138/90    Wt Readings from Last 3 Encounters:   22 90.9 kg (200 lb 6.4 oz)   22 89.4 kg (197 lb)   22 95.3 kg (210 lb)         Patient Active Problem List   Diagnosis     Abnormally low high density lipoprotein (HDL) cholesterol with hypertriglyceridemia     Varicocele     Vitiligo     History of decreased platelet count     Leukopenia, unspecified type     Lipoma of right forearm     History of CVA (cerebrovascular accident)     Migraine headache with aura     Overweight     PFO (patent foramen ovale)     Past Surgical History:   Procedure Laterality Date     ARTHROSCOPY KNEE       EXCISE LESION BACK Left 2021    Procedure: EXCISION, LESION, BACK;  Surgeon: Daniel Barbosa MD;  [place] [facility] [place]      KNEE SCOPE, DIAGNOSTIC      Description: Arthroscopy Knee Right;  Recorded: 2013;     REMOVAL OF SPERM DUCT(S)      Description: Surgery Of Male Genitalia Vasectomy;  Recorded: 2013;     VASECTOMY         Social History     Tobacco Use     Smoking status: Never     Smokeless tobacco: Never   Substance Use Topics     Alcohol use: Yes     Comment: 5 dirnks a week      Family History   Problem Relation Age of Onset     Hypertension Mother      Cerebrovascular Disease Mother         patent foramen ovale     Seizure Disorder Mother      Diabetes Father      Diabetes Paternal Grandmother                  Current Outpatient Medications   Medication Sig Dispense Refill     aspirin (ASA) 81 MG chewable tablet Take 81 mg by mouth daily       Cyanocobalamin (VITAMIN B 12 PO) daily b  complex       LUTEIN PO        OMEGA-3 FATTY ACIDS PO        tadalafil (CIALIS) 5 MG tablet Take 1 tablet (5 mg) by mouth daily as needed (1/2 to 4 hrs priro to need) 12 tablet 11     vitamin B complex with vitamin C (STRESS TAB) tablet        vitamin C (ASCORBIC ACID) 500 MG tablet Take 500 mg by mouth       atorvastatin (LIPITOR) 80 MG tablet Take 80 mg by mouth daily       cholecalciferol (VITAMIN D3) 125 mcg (5000 units) capsule        Allergies   Allergen Reactions     Sumatriptan Other (See Comments)     History of cerebellar CVA     Nsaids Other (See Comments)     ulcer     Recent Labs   Lab Test 11/28/22  1000 03/07/22  1213 04/14/21  0855 06/18/19  0826   A1C 5.8* 5.5 5.6 5.6   LDL 95  --  91 94   HDL 35*  --  37* 31*   TRIG 151*  --  141 150*   ALT 46 44 63 27   CR 0.83 0.79 0.87 0.91   GFRESTIMATED >90 >90 >90 >60   GFRESTBLACK  --   --  >90 >60   POTASSIUM 4.5 4.2 4.3 4.6   TSH 2.02 2.27 1.87 2.39        Reviewed and updated as needed this visit by clinical staff   Tobacco  Allergies  Meds  Problems             Reviewed and updated as needed this visit by Provider   Tobacco  Allergies  Meds  Problems            Past Medical History:   Diagnosis Date     BMI 27.0-27.9,adult 8/11/2021     Cerebrovascular accident (CVA) involving cerebellum (H) 7/19/2022     Cervical radiculitis 2/15/2021     Esophageal reflux 04/13/2021     Lipoma of skin and subcutaneous tissue 3/23/2021    Added automatically from request for surgery [id number]     Migraine 06/20/2016    Formatting of this note might be different from the original. Related to dehydration.     Patent foramen ovale 9/26/2022     Vitiligo       Past Surgical History:   Procedure Laterality Date     ARTHROSCOPY KNEE       EXCISE LESION BACK Left 4/26/2021    Procedure: EXCISION, LESION, BACK;  Surgeon: Daniel Barbosa MD;  [place] [facility] [place]      KNEE SCOPE, DIAGNOSTIC      Description: Arthroscopy Knee Right;  Recorded: 05/07/2013;     REMOVAL  "OF SPERM DUCT(S)      Description: Surgery Of Male Genitalia Vasectomy;  Recorded: 05/07/2013;     VASECTOMY       OB History   No obstetric history on file.       Review of Systems   Constitutional: Negative for chills and fever.   HENT: Negative for congestion, ear pain, hearing loss and sore throat.    Eyes: Negative for pain and visual disturbance.   Respiratory: Negative for cough and shortness of breath.    Cardiovascular: Negative for chest pain, palpitations and peripheral edema.   Gastrointestinal: Negative for abdominal pain, constipation, diarrhea, heartburn, hematochezia and nausea.   Genitourinary: Positive for impotence. Negative for dysuria, frequency, genital sores, hematuria, penile discharge and urgency.   Musculoskeletal: Negative for arthralgias, joint swelling and myalgias.   Skin: Negative for rash.   Neurological: Negative for dizziness, weakness, headaches and paresthesias.   Psychiatric/Behavioral: Negative for mood changes. The patient is not nervous/anxious.      OBJECTIVE:   /70 (BP Location: Right arm, Patient Position: Sitting, Cuff Size: Adult Large)   Pulse 81   Temp 97.4  F (36.3  C) (Temporal)   Resp 16   Ht 1.803 m (5' 11\")   Wt 90.9 kg (200 lb 6.4 oz)   SpO2 98%   BMI 27.95 kg/m      Physical Exam  GENERAL: healthy, alert, no distress and over weight  EYES: Eyes grossly normal to inspection, PERRL and conjunctivae and sclerae normal  HENT: ear canals and TM's normal, nose and mouth without ulcers or lesions  NECK: no adenopathy, no asymmetry, masses, or scars and thyroid normal to palpation  RESP: lungs clear to auscultation - no rales, rhonchi or wheezes  CV: regular rate and rhythm, normal S1 S2, no S3 or S4, no murmur, click or rub, no peripheral edema and peripheral pulses strong  ABDOMEN: soft, nontender, no hepatosplenomegaly, no masses and bowel sounds normal   (male): normal male genitalia without lesions or urethral discharge, no hernia, varicocele  MS: no " gross musculoskeletal defects noted, no edema  SKIN: no suspicious lesions or rashes.  Multiple pigmented nevi, lipoma right forearm ventral aspect mid forearm, vitiligo present antecubital area bilaterally dorsum of hands pubic area,  NEURO: Normal strength and tone, mentation intact and speech normal  PSYCH: mentation appears normal, affect normal/bright    Diagnostic Test Results:  Labs reviewed in Epic  Results for orders placed or performed in visit on 11/28/22 (from the past 24 hour(s))   CBC with platelets and differential    Narrative    The following orders were created for panel order CBC with platelets and differential.  Procedure                               Abnormality         Status                     ---------                               -----------         ------                     CBC with platelets and d...[[id number]]                      Final result                 Please view results for these tests on the individual orders.   Comprehensive metabolic panel (BMP + Alb, Alk Phos, ALT, AST, Total. Bili, TP)   Result Value Ref Range    Sodium 142 136 - 145 mmol/L    Potassium 4.5 3.4 - 5.3 mmol/L    Chloride 106 98 - 107 mmol/L    Carbon Dioxide (CO2) 26 22 - 29 mmol/L    Anion Gap 10 7 - 15 mmol/L    Urea Nitrogen 12.2 6.0 - 20.0 mg/dL    Creatinine 0.83 0.67 - 1.17 mg/dL    Calcium 9.2 8.6 - 10.0 mg/dL    Glucose 106 (H) 70 - 99 mg/dL    Alkaline Phosphatase 93 40 - 129 U/L    AST 27 10 - 50 U/L    ALT 46 10 - 50 U/L    Protein Total 7.3 6.4 - 8.3 g/dL    Albumin 4.4 3.5 - 5.2 g/dL    Bilirubin Total 0.6 <=1.2 mg/dL    GFR Estimate >90 >60 mL/min/1.73m2   Lipid Profile (Chol, Trig, HDL, LDL calc)   Result Value Ref Range    Cholesterol 160 <200 mg/dL    Triglycerides 151 (H) <150 mg/dL    Direct Measure HDL 35 (L) >=40 mg/dL    LDL Cholesterol Calculated 95 <=100 mg/dL    Non HDL Cholesterol 125 <130 mg/dL    Narrative    Cholesterol  Desirable:  <200 mg/dL    Triglycerides  Normal:  Less  than 150 mg/dL  Borderline High:  150-199 mg/dL  High:  200-499 mg/dL  Very High:  Greater than or equal to 500 mg/dL    Direct Measure HDL  Female:  Greater than or equal to 50 mg/dL   Male:  Greater than or equal to 40 mg/dL    LDL Cholesterol  Desirable:  <100mg/dL  Above Desirable:  100-129 mg/dL   Borderline High:  130-159 mg/dL   High:  160-189 mg/dL   Very High:  >= 190 mg/dL    Non HDL Cholesterol  Desirable:  130 mg/dL  Above Desirable:  130-159 mg/dL  Borderline High:  160-189 mg/dL  High:  190-219 mg/dL  Very High:  Greater than or equal to 220 mg/dL   TSH with free T4 reflex   Result Value Ref Range    TSH 2.02 0.30 - 4.20 uIU/mL   Hemoglobin A1c   Result Value Ref Range    Hemoglobin A1C 5.8 (H) 0.0 - 5.6 %   Albumin Random Urine Quantitative with Creat Ratio   Result Value Ref Range    Albumin Urine mg/L <12.0 mg/L    Albumin Urine mg/g Cr      Creatinine Urine mg/dL 142.0 mg/dL   Hepatitis B surface antigen   Result Value Ref Range    Hepatitis B Surface Antigen Nonreactive Nonreactive   Hepatitis B Surface Antibody   Result Value Ref Range    Hepatitis B Surface Antibody Instrument Value 20.68 <8.00 m[IU]/mL    Hepatitis B Surface Antibody Reactive    Vitamin B12   Result Value Ref Range    Vitamin B12 544 232 - 1,245 pg/mL   CBC with platelets and differential   Result Value Ref Range    WBC Count 4.7 4.0 - 11.0 10e3/uL    RBC Count 4.52 4.40 - 5.90 10e6/uL    Hemoglobin 13.9 13.3 - 17.7 g/dL    Hematocrit 40.7 40.0 - 53.0 %    MCV 90 78 - 100 fL    MCH 30.8 26.5 - 33.0 pg    MCHC 34.2 31.5 - 36.5 g/dL    RDW 11.8 10.0 - 15.0 %    Platelet Count 173 150 - 450 10e3/uL    % Neutrophils 51 %    % Lymphocytes 34 %    % Monocytes 9 %    % Eosinophils 6 %    % Basophils 1 %    % Immature Granulocytes 0 %    Absolute Neutrophils 2.4 1.6 - 8.3 10e3/uL    Absolute Lymphocytes 1.6 0.8 - 5.3 10e3/uL    Absolute Monocytes 0.4 0.0 - 1.3 10e3/uL    Absolute Eosinophils 0.3 0.0 - 0.7 10e3/uL    Absolute Basophils  0.0 0.0 - 0.2 10e3/uL    Absolute Immature Granulocytes 0.0 <=0.4 10e3/uL     Results for orders placed or performed in visit on 11/28/22   Comprehensive metabolic panel (BMP + Alb, Alk Phos, ALT, AST, Total. Bili, TP)     Status: Abnormal   Result Value Ref Range    Sodium 142 136 - 145 mmol/L    Potassium 4.5 3.4 - 5.3 mmol/L    Chloride 106 98 - 107 mmol/L    Carbon Dioxide (CO2) 26 22 - 29 mmol/L    Anion Gap 10 7 - 15 mmol/L    Urea Nitrogen 12.2 6.0 - 20.0 mg/dL    Creatinine 0.83 0.67 - 1.17 mg/dL    Calcium 9.2 8.6 - 10.0 mg/dL    Glucose 106 (H) 70 - 99 mg/dL    Alkaline Phosphatase 93 40 - 129 U/L    AST 27 10 - 50 U/L    ALT 46 10 - 50 U/L    Protein Total 7.3 6.4 - 8.3 g/dL    Albumin 4.4 3.5 - 5.2 g/dL    Bilirubin Total 0.6 <=1.2 mg/dL    GFR Estimate >90 >60 mL/min/1.73m2   Lipid Profile (Chol, Trig, HDL, LDL calc)     Status: Abnormal   Result Value Ref Range    Cholesterol 160 <200 mg/dL    Triglycerides 151 (H) <150 mg/dL    Direct Measure HDL 35 (L) >=40 mg/dL    LDL Cholesterol Calculated 95 <=100 mg/dL    Non HDL Cholesterol 125 <130 mg/dL    Narrative    Cholesterol  Desirable:  <200 mg/dL    Triglycerides  Normal:  Less than 150 mg/dL  Borderline High:  150-199 mg/dL  High:  200-499 mg/dL  Very High:  Greater than or equal to 500 mg/dL    Direct Measure HDL  Female:  Greater than or equal to 50 mg/dL   Male:  Greater than or equal to 40 mg/dL    LDL Cholesterol  Desirable:  <100mg/dL  Above Desirable:  100-129 mg/dL   Borderline High:  130-159 mg/dL   High:  160-189 mg/dL   Very High:  >= 190 mg/dL    Non HDL Cholesterol  Desirable:  130 mg/dL  Above Desirable:  130-159 mg/dL  Borderline High:  160-189 mg/dL  High:  190-219 mg/dL  Very High:  Greater than or equal to 220 mg/dL   TSH with free T4 reflex     Status: Normal   Result Value Ref Range    TSH 2.02 0.30 - 4.20 uIU/mL   Hemoglobin A1c     Status: Abnormal   Result Value Ref Range    Hemoglobin A1C 5.8 (H) 0.0 - 5.6 %   Albumin Random  Urine Quantitative with Creat Ratio     Status: None   Result Value Ref Range    Albumin Urine mg/L <12.0 mg/L    Albumin Urine mg/g Cr      Creatinine Urine mg/dL 142.0 mg/dL   Hepatitis B surface antigen     Status: Normal   Result Value Ref Range    Hepatitis B Surface Antigen Nonreactive Nonreactive   Hepatitis B Surface Antibody     Status: None   Result Value Ref Range    Hepatitis B Surface Antibody Instrument Value 20.68 <8.00 m[IU]/mL    Hepatitis B Surface Antibody Reactive    Vitamin B12     Status: Normal   Result Value Ref Range    Vitamin B12 544 232 - 1,245 pg/mL   CBC with platelets and differential     Status: None   Result Value Ref Range    WBC Count 4.7 4.0 - 11.0 10e3/uL    RBC Count 4.52 4.40 - 5.90 10e6/uL    Hemoglobin 13.9 13.3 - 17.7 g/dL    Hematocrit 40.7 40.0 - 53.0 %    MCV 90 78 - 100 fL    MCH 30.8 26.5 - 33.0 pg    MCHC 34.2 31.5 - 36.5 g/dL    RDW 11.8 10.0 - 15.0 %    Platelet Count 173 150 - 450 10e3/uL    % Neutrophils 51 %    % Lymphocytes 34 %    % Monocytes 9 %    % Eosinophils 6 %    % Basophils 1 %    % Immature Granulocytes 0 %    Absolute Neutrophils 2.4 1.6 - 8.3 10e3/uL    Absolute Lymphocytes 1.6 0.8 - 5.3 10e3/uL    Absolute Monocytes 0.4 0.0 - 1.3 10e3/uL    Absolute Eosinophils 0.3 0.0 - 0.7 10e3/uL    Absolute Basophils 0.0 0.0 - 0.2 10e3/uL    Absolute Immature Granulocytes 0.0 <=0.4 10e3/uL   CBC with platelets and differential     Status: None    Narrative    The following orders were created for panel order CBC with platelets and differential.  Procedure                               Abnormality         Status                     ---------                               -----------         ------                     CBC with platelets and d...[[id number]]                      Final result                 Please view results for these tests on the individual orders.       ASSESSMENT/PLAN:       ICD-10-CM    1. Routine history and physical examination of adult   Z00.00 Hemoglobin A1c      2. History of CVA (cerebrovascular accident)  Z86.73 Comprehensive metabolic panel (BMP + Alb, Alk Phos, ALT, AST, Total. Bili, TP)     Lipid Profile (Chol, Trig, HDL, LDL calc)     TSH with free T4 reflex     Albumin Random Urine Quantitative with Creat Ratio     Vitamin B12      3. PFO (patent foramen ovale)  Q21.12     s/p closure 11/14      4. Abnormally low high density lipoprotein (HDL) cholesterol with hypertriglyceridemia  E78.6     E78.1       5. Elevated blood pressure reading without diagnosis of hypertension  R03.0 Comprehensive metabolic panel (BMP + Alb, Alk Phos, ALT, AST, Total. Bili, TP)     Albumin Random Urine Quantitative with Creat Ratio      6. Migraine with aura and without status migrainosus, not intractable  G43.109       7. Vegetarian  Z78.9 Vitamin B12      8. Overweight  E66.3       9. History of decreased platelet count  Z86.2 CBC with platelets and differential     TSH with free T4 reflex      10. Leukopenia, unspecified type  D72.819 CBC with platelets and differential     TSH with free T4 reflex      11. Lipoma of right forearm  D17.21       12. History of 2019 novel coronavirus disease (COVID-19)  Z86.16       13. Vitiligo  L80       14. Varicocele  I86.1       15. Erectile dysfunction, unspecified erectile dysfunction type  N52.9 tadalafil (CIALIS) 5 MG tablet      16. Health care maintenance  Z00.00 REVIEW OF HEALTH MAINTENANCE PROTOCOL ORDERS      17. Advanced directives, counseling/discussion  Z71.89       18. Colon cancer screening  Z12.11       19. Need for prophylactic vaccination and inoculation against influenza  Z23 INFLUENZA VACCINE IM > 6 MONTHS VALENT IIV4 (AFLURIA/FLUZONE)      20. Need for COVID-19 vaccine  Z23 COVID-19 VACCINE BIVALENT BOOSTER 18+ (MODERNA)      21. Need for pneumococcal vaccine  Z23       22. Need for hepatitis B vaccination  Z23       23. Screening for diabetes mellitus  Z13.1 Hemoglobin A1c      24. Immunity status  testing  Z01.84 Hepatitis B surface antigen     Hepatitis B Surface Antibody        Seen for preventive health and additional concerns today   Self testicular check regularly   Labs today and will make further recommendations once reviewed  History of right cerebellar [facility] with [facility] word finding difficulty and right-sided clumsiness improved.  Writing not back to baseline but improved from before.  Seen by neurology and interventional neurologist at the [facility] post stroke. No known hypercoagulable state found.  Assessed to be cryptogenic thrombotic nature, no DVT found on ultrasound.  PFO found s/p closure November 14 at the [facility].  Currently doing well.  To follow-up with cardiology as planned in a few weeks at [facility], may discuss loop recorder with them.  Continue aspirin 81 mg daily.  Recommend continuing statin may be not at 80 mg dosing we will see what lipids show and go from there.   History of historically low HDL and hypertriglyceridemia likely genetic.  But statins will decrease endothelial damage and cardiovascular risk.  Blood pressure is looking improved.  History of migraines with visual aura avoid all triptans given stroke history.  A well-hydrated as dehydration appears to be trigger historically  Vegetarian continue with B complex we will check B12 level today.  Weight has improved continue to work on eating healthy and BMI in a healthy range.  History of historically low platelets and white cell count we will recheck labs today.  Lipoma right forearm and multiple pigmented nevi continue care with dermatology.  History of vitiligo unchanged follow-up with dermatology as planned.  Urticaria of unknown cause ruled out for vasculitis in April 2022 by dermatology  History of COVID s/p Paxlovid in October with rebound COVID symptoms now resolved.  Varicocele unchanged and asymptomatic.  ED managed with Cialis 5 mg as needed #12 given with 11 refills.   Currently prescribed to take 5 mg half an hour prior to sexual activity as needed.  May be effective for 36 hours after single dose.  Do not split dose.  Counseled about risk of flushing, headache, sinus infection, priapism. Avoid any alpha blockers on this medication.  If should have chest pain or EMS comes to the house or presents to ER needs to disclose if used this medication in the prior 48 hours to prevent being given meds that may cause drop in blood pressure.  Plan #12 with 11 refills.    Healthcare maintenance reviewed.  Has ACP at home and on file.  Colon cancer screening due in 2031.  Flu shot given today.  Moderna by Valent booster given today.  Consider Prevnar 20 given history of PFO.  Consider hepatitis B vaccination if immune testing today shows not immune.  These later came back showing immunity  If traveling outside the country recommend being seen by the travel clinic.    Return in 1 year for preventive physical or sooner in an office visit for any new concerns     ADDENDUM  Triglycerides 151 similar to prior.  HDL is low similar to prior.  LDL is 95 ideally if has had a stroke recommendation is to keep the LDL less than 70.  Currently on atorvastatin 80 mg and LDL is 95.  If we lower the dose or take him off it completely then it may go higher.  But he also mentioned that had not been taking medication recently so not sure if it is elevated now because had not been taking it.  It would be a good idea if  resumes the atorvastatin 80 mg bedtime and recheck cholesterol in 3 months on this dose and if LDL is less than 70 at that time then we can cut back on the dose and recheck after that and adjust as needed.  May also visit this issue with the neurologist and cardiologist at the [facility] to get their opinion.  [employer] message sent and awaiting his response on this.  Hemoglobin A1c also came back showing prediabetic range recommend rechecking in 6 months and a lab only appointment.    Patient has  "been advised of split billing requirements and indicates understanding: Yes      COUNSELING:   Reviewed preventive health counseling, as reflected in patient instructions       Regular exercise       Healthy diet/nutrition       Vision screening       Immunizations    Vaccinated for: Covid-19 and Influenza         Alcohol Use        Colorectal cancer screening       The ASCVD Risk score (Pascale REYNA, et al., 2019) failed to calculate for the following reasons:    The patient has a prior MI or stroke diagnosis       Advance Care Planning    BMI:   Estimated body mass index is 27.95 kg/m  as calculated from the following:    Height as of this encounter: 1.803 m (5' 11\").    Weight as of this encounter: 90.9 kg (200 lb 6.4 oz).   Weight management plan: Discussed healthy diet and exercise guidelines    He reports that he has never smoked. He has never used smokeless tobacco.    Izzy Hutson MD  [facility]  "

## 2022-11-29 ENCOUNTER — PRE VISIT (OUTPATIENT)
Dept: NEUROLOGY | Facility: CLINIC | Age: 47
End: 2022-11-29

## 2023-03-12 NOTE — PROGRESS NOTES
[facility] [address] [place] [address]  Phone: [phone]  Primary Provider: Regis Marino  Pre-op Performing Provider: L[facility]    PREOPERATIVE EVALUATION:  Today's date: 4/14/2021    Zaid Xie is a 45 year old male who presents for a preoperative evaluation.    Surgical Information:  Surgery/Procedure: EXCISION, LESION, BACK  Surgery Location: [facility]  Surgeon: Daniel Barbosa MD  Surgery Date: 04/26/2021  Time of Surgery: 7:15 AM  Where patient plans to recover: At home with family  Fax number for surgical facility: Note does not need to be faxed, will be available electronically in Epic.     Type of Anesthesia Anticipated: Local    Assessment & Plan     The proposed surgical procedure is considered LOW risk.    Pre-operative general physical examination  Here for a preop. Ne to this provider.   Covid testing per surgeon 4/22/21  Labs and diagnostics today   EKG not indicated  If stable will clear for surgery otherwise may need additional work up   Take no meds am of surgery  Hold aspirin, antiinflammatories like motrin aleve etc, fish oil, all vitamins and glucosamine preferably 7 days prior to surgery  Will fax /send note to surgeon once completed   Camp physical & preventive labs done  Cannot not do preventive physical at same time as preop physical due to limitations in time and insurance   Work on advance directives  Continue routine care & preventive physical with PCP Dr Marino  - CBC with platelets differential  - Comprehensive metabolic panel    Lipoma of skin and subcutaneous tissue  To have surgically removed under local left upper mid back on 4/26/21  - CBC with platelets differential    Essential hypertriglyceridemia  - Comprehensive metabolic panel  - Lipid panel reflex to direct LDL Fasting  - TSH with free T4 reflex    BMI 28.0-28.9,adult  Diet, exercise, weight loss briefly discussed   - TSH with free T4  Transfusion administration record tubed to PICU at 2015   reflex    Vitiligo  Chronic unchanged. S/o UVB rx , follows with dermatology.  - TSH with free T4 reflex    Screening for HIV (human immunodeficiency virus)  - HIV Antigen Antibody Combo    Need for hepatitis C screening test  - Hepatitis C Screen Reflex to HCV RNA Quant and Genotype    Physical exam for camp  Form clearing for  activities given & copy made for epic    Advanced directives, counseling/discussion  Mentioned briefly working on advance directives    Risks and Recommendations:  The patient has the following additional risks and recommendations for perioperative complications:   - No identified additional risk factors other than previously addressed    Medication Instructions:  Patient is on no chronic medications    RECOMMENDATION:  APPROVAL GIVEN to proceed with proposed procedure, without further diagnostic evaluation.    Review of prior external note(s) from - [facility] information from [facility] [employer] [facility] reviewed  Review of the result(s) of each unique test - cbc, tpo, prior labs in care everywhere 2011 to 2019    35 minutes spent on the date of the encounter doing chart review, history and exam, documentation and further activities per the note    Subjective     HPI related to upcoming procedure:   Here for a preop for left upper back large lipoma removal under local anesthesia. New to this provider. Also desiring  camp physical with labs. Due to pandemic not had a preventive physical in a while.    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 U/S done 2/15 for large subcutaneous mass left  lateral upper back showed a lipoma, referred to the surgeon, seen 3/22 and noted to get surgically removed under local anesthesia.    Recently no fever or chills, no headache or dizziness, no double or blurry vision, no facial pain, earache, sore throat, runny nose, post nasal drip, no trouble hearing, smelling, tasting or swallowing, no cough , no chest pain, trouble breathing or palpitations, No abdominal pain, heart burn, reflux, nausea or vomiting or diarrhea or constipation, no blood in stools or black stools, no weight loss or night sweats. No dysuria, hematuria, frequency, urgency, hesitancy, incontinence, No pelvic complaints. No leg swelling or joint pain. No rash.    Pinched nerve in neck, doing PT.   Elevated TG in past on on meds  BMI 28  vitiligo hx, prior TPO positive in [facility], has done UVB rx with derm in past, seen by derm for removal of skin moles, has several and gets skin checked yearly   Due to pandemic unable to see derm or dentist last year but plans to this year  Ok to get cbc, CMP, TSH, lipids(is fasting), HIV and hep C testing done    Preop Questions 4/13/2021   1. Have you ever had a heart attack or stroke? No   2. Have you ever had surgery on your heart or blood vessels, such as a stent placement, a coronary artery bypass, or surgery on an artery in your head, neck, heart, or legs? No   3. Do you have chest pain with activity? No   4. Do you have a history of  heart failure? No   5. Do you currently have a cold, bronchitis or symptoms of other infection? No   6. Do you have a cough, shortness of breath, or wheezing? No   7. Do you or anyone in your family have previous history of blood clots? YES - mother had a stroke  via a pfo since fixed    8. Do you or does anyone in your family have a serious bleeding problem such as prolonged bleeding following surgeries or cuts? No   9. Have you ever had problems with anemia or been told to take iron pills? No   10. Have you had any abnormal  blood loss such as black, tarry or bloody stools? No   11. Have you ever had a blood transfusion? No   12. Are you willing to have a blood transfusion if it is medically needed before, during, or after your surgery? Yes   13. Have you or any of your relatives ever had problems with anesthesia? No   14. Do you have sleep apnea, excessive snoring or daytime drowsiness? No   15. Do you have any artifical heart valves or other implanted medical devices like a pacemaker, defibrillator, or continuous glucose monitor? No   16. Do you have artificial joints? No   17. Are you allergic to latex? No     Health Care Directive:  Patient does not have a Health Care Directive or Living Will:    Preoperative Review of :   reviewed - no record of controlled substances prescribed.    Status of Chronic Conditions:as above    Review of Systems  CONSTITUTIONAL: NEGATIVE for fever, chills, change in weight  INTEGUMENTARY/SKIN: NEGATIVE for worrisome rashes, moles or lesions. Has large left lipoma & vitiligo long standing  EYES: NEGATIVE for vision changes or irritation  ENT/MOUTH: NEGATIVE for ear, mouth and throat problems  RESP: NEGATIVE for significant cough or SOB  BREAST: NEGATIVE for masses, tenderness or discharge  CV: NEGATIVE for chest pain, palpitations or peripheral edema  GI: NEGATIVE for nausea, abdominal pain, heartburn, or change in bowel habits  : NEGATIVE for frequency, dysuria, or hematuria  MUSCULOSKELETAL: NEGATIVE for significant arthralgias or myalgia  NEURO: NEGATIVE for weakness, dizziness or paresthesias  ENDOCRINE: NEGATIVE for temperature intolerance, skin/hair changes  HEME: NEGATIVE for bleeding problems  PSYCHIATRIC: NEGATIVE for changes in mood or affect    Patient Active Problem List    Diagnosis Date Noted     Essential hypertriglyceridemia 04/13/2021     Priority: Medium     Lipoma of skin and subcutaneous tissue 03/23/2021     Priority: Medium     Added automatically from request for surgery  "[id number]       Cervical radiculitis 02/15/2021     Priority: Medium     Varicocele 06/21/2017     Priority: Medium     Vitiligo 06/20/2016     Priority: Medium     Formatting of this note might be different from the original.  First noticed around 2008.        Past Medical History:   Diagnosis Date     Esophageal reflux 04/13/2021     Migraine 06/20/2016    Formatting of this note might be different from the original. Related to dehydration.     Vitiligo      Past Surgical History:   Procedure Laterality Date     ARTHROSCOPY KNEE       VASECTOMY       Current Outpatient Medications   Medication Sig Dispense Refill     cholecalciferol (VITAMIN D3) 125 mcg (5000 units) capsule        LUTEIN PO        OMEGA-3 FATTY ACIDS PO        vitamin B complex with vitamin C (VITAMIN  B COMPLEX) tablet        vitamin C (ASCORBIC ACID) 500 MG tablet Take 500 mg by mouth         Allergies   Allergen Reactions     Nsaids Other (See Comments)     ulcer        Social History     Tobacco Use     Smoking status: Never Smoker     Smokeless tobacco: Never Used   Substance Use Topics     Alcohol use: Yes     Frequency: 4 or more times a week     Drinks per session: 1 or 2     Comment: 5 dirnks a week      Family History   Problem Relation Age of Onset     Hypertension Mother      Cerebrovascular Disease Mother         patent foramen ovale     Diabetes Father      History   Drug Use Unknown         Objective     /76 (BP Location: Right arm, Patient Position: Chair, Cuff Size: Adult Regular)   Pulse 61   Temp 98.2  F (36.8  C) (Oral)   Resp 16   Ht 1.81 m (5' 11.25\")   Wt 93.9 kg (207 lb)   SpO2 98%   BMI 28.67 kg/m      Physical Exam    GENERAL APPEARANCE: healthy, alert and no distress     EYES: EOMI,  PERRL     HENT: ear canals and TM's normal and nose and mouth without ulcers or lesions     NECK: no adenopathy, no asymmetry, masses, or scars and thyroid normal to palpation     RESP: lungs clear to auscultation - no rales, " rhonchi or wheezes     CV: regular rates and rhythm, normal S1 S2, no S3 or S4 and no murmur, click or rub     ABDOMEN:  soft, non tender, no HSM or masses and bowel sounds normal     GU_male: testicles normal without atrophy or masses, no hernias and penis normal without urethral discharge     MS: extremities normal- no gross deformities noted, no evidence of inflammation in joints, FROM in all extremities.     SKIN: no suspicious lesions or rashes. Large left lateral lipoma left upper back below axilla & lateral upper back about 10 plus cm, also scattered multiple pigmented nevi & lentigines noted on chest, back, abdomen torso & sun freckles on exposed surfaces extremities     NEURO: Normal strength and tone, sensory exam grossly normal, mentation intact and speech normal     PSYCH: mentation appears normal. and affect normal/bright     LYMPHATICS: No cervical adenopathy    No results for input(S): HGB, PLT, INR, NA, POTASSIUM, CR, A1C in the last [id number] hours.     Diagnostics:  Labs pending at this time.  Results will be reviewed when available.  Recent Results (from the past 24 hour(s))   CBC with platelets differential    Collection Time: 04/14/21  8:55 AM   Result Value Ref Range    WBC 3.9 (L) 4.0 - 11.0 10e9/L    RBC Count 4.77 4.4 - 5.9 10e12/L    Hemoglobin 14.8 13.3 - 17.7 g/dL    Hematocrit 43.3 40.0 - 53.0 %    MCV 91 78 - 100 fl    MCH 31.0 26.5 - 33.0 pg    MCHC 34.2 31.5 - 36.5 g/dL    RDW 11.9 10.0 - 15.0 %    Platelet Count 145 (L) 150 - 450 10e9/L    Diff Method Automated Method     % Neutrophils 49.2 %    % Lymphocytes 34.5 %    % Monocytes 11.2 %    % Eosinophils 4.6 %    % Basophils 0.5 %    Absolute Neutrophil 1.9 1.6 - 8.3 10e9/L    Absolute Lymphocytes 1.4 0.8 - 5.3 10e9/L    Absolute Monocytes 0.4 0.0 - 1.3 10e9/L    Absolute Eosinophils 0.2 0.0 - 0.7 10e9/L    Absolute Basophils 0.0 0.0 - 0.2 10e9/L      No EKG required for low risk surgery (cataract, skin procedure, breast biopsy,  etc).    Revised Cardiac Risk Index (RCRI):  The patient has the following serious cardiovascular risks for perioperative complications:   - No serious cardiac risks = 0 points     RCRI Interpretation: 0 points: Class I (very low risk - 0.4% complication rate)           Signed Electronically by: Izzy Hutson MD  Copy of this evaluation report is provided to requesting physician.

## 2023-06-05 ENCOUNTER — OFFICE VISIT (OUTPATIENT)
Dept: FAMILY MEDICINE | Facility: CLINIC | Age: 48
End: 2023-06-05
Payer: COMMERCIAL

## 2023-06-05 VITALS
DIASTOLIC BLOOD PRESSURE: 75 MMHG | OXYGEN SATURATION: 98 % | RESPIRATION RATE: 15 BRPM | TEMPERATURE: 97.6 F | HEIGHT: 72 IN | BODY MASS INDEX: 27.77 KG/M2 | HEART RATE: 63 BPM | SYSTOLIC BLOOD PRESSURE: 110 MMHG | WEIGHT: 205 LBS

## 2023-06-05 DIAGNOSIS — Z23 NEED FOR PNEUMOCOCCAL VACCINE: ICD-10-CM

## 2023-06-05 DIAGNOSIS — L80 VITILIGO: ICD-10-CM

## 2023-06-05 DIAGNOSIS — M25.571 RIGHT ANKLE PAIN, UNSPECIFIED CHRONICITY: ICD-10-CM

## 2023-06-05 DIAGNOSIS — Z87.74 S/P PATENT FORAMEN OVALE CLOSURE: ICD-10-CM

## 2023-06-05 DIAGNOSIS — Z86.73 HISTORY OF CVA (CEREBROVASCULAR ACCIDENT): Primary | ICD-10-CM

## 2023-06-05 DIAGNOSIS — E78.6 ABNORMALLY LOW HIGH DENSITY LIPOPROTEIN (HDL) CHOLESTEROL WITH HYPERTRIGLYCERIDEMIA: ICD-10-CM

## 2023-06-05 DIAGNOSIS — G43.109 MIGRAINE WITH AURA AND WITHOUT STATUS MIGRAINOSUS, NOT INTRACTABLE: ICD-10-CM

## 2023-06-05 DIAGNOSIS — E78.1 ABNORMALLY LOW HIGH DENSITY LIPOPROTEIN (HDL) CHOLESTEROL WITH HYPERTRIGLYCERIDEMIA: ICD-10-CM

## 2023-06-05 DIAGNOSIS — R03.0 ELEVATED BLOOD PRESSURE READING WITHOUT DIAGNOSIS OF HYPERTENSION: ICD-10-CM

## 2023-06-05 DIAGNOSIS — I86.1 VARICOCELE: ICD-10-CM

## 2023-06-05 DIAGNOSIS — R73.03 PREDIABETES: ICD-10-CM

## 2023-06-05 DIAGNOSIS — Z78.9 VEGETARIAN: ICD-10-CM

## 2023-06-05 DIAGNOSIS — E66.3 OVERWEIGHT: ICD-10-CM

## 2023-06-05 DIAGNOSIS — N52.9 ERECTILE DYSFUNCTION, UNSPECIFIED ERECTILE DYSFUNCTION TYPE: ICD-10-CM

## 2023-06-05 DIAGNOSIS — Z02.89 ENCOUNTER FOR OTHER ADMINISTRATIVE EXAMINATIONS: ICD-10-CM

## 2023-06-05 DIAGNOSIS — I83.91 VARICOSE VEINS OF RIGHT LOWER EXTREMITY, UNSPECIFIED WHETHER COMPLICATED: ICD-10-CM

## 2023-06-05 DIAGNOSIS — D17.21 LIPOMA OF RIGHT FOREARM: ICD-10-CM

## 2023-06-05 PROBLEM — Z86.2 HISTORY OF DECREASED PLATELET COUNT: Status: RESOLVED | Noted: 2021-08-11 | Resolved: 2023-06-05

## 2023-06-05 PROBLEM — I83.93 ASYMPTOMATIC VARICOSE VEINS OF BOTH LOWER EXTREMITIES: Status: ACTIVE | Noted: 2023-06-05

## 2023-06-05 PROBLEM — D72.819 LEUKOPENIA, UNSPECIFIED TYPE: Status: RESOLVED | Noted: 2021-08-11 | Resolved: 2023-06-05

## 2023-06-05 PROBLEM — M25.572 BILATERAL ANKLE PAIN, UNSPECIFIED CHRONICITY: Status: ACTIVE | Noted: 2023-06-05

## 2023-06-05 LAB
ALBUMIN SERPL BCG-MCNC: 4.5 G/DL (ref 3.5–5.2)
ALP SERPL-CCNC: 91 U/L (ref 40–129)
ALT SERPL W P-5'-P-CCNC: 32 U/L (ref 10–50)
ANION GAP SERPL CALCULATED.3IONS-SCNC: 8 MMOL/L (ref 7–15)
AST SERPL W P-5'-P-CCNC: 29 U/L (ref 10–50)
BILIRUB SERPL-MCNC: 0.5 MG/DL
BUN SERPL-MCNC: 14 MG/DL (ref 6–20)
CALCIUM SERPL-MCNC: 9.2 MG/DL (ref 8.6–10)
CHLORIDE SERPL-SCNC: 107 MMOL/L (ref 98–107)
CHOLEST SERPL-MCNC: 143 MG/DL
CREAT SERPL-MCNC: 0.93 MG/DL (ref 0.67–1.17)
DEPRECATED HCO3 PLAS-SCNC: 24 MMOL/L (ref 22–29)
GFR SERPL CREATININE-BSD FRML MDRD: >90 ML/MIN/1.73M2
GLUCOSE SERPL-MCNC: 107 MG/DL (ref 70–99)
HBA1C MFR BLD: 5.5 % (ref 0–5.6)
HDLC SERPL-MCNC: 35 MG/DL
LDLC SERPL CALC-MCNC: 83 MG/DL
NONHDLC SERPL-MCNC: 108 MG/DL
POTASSIUM SERPL-SCNC: 4.1 MMOL/L (ref 3.4–5.3)
PROT SERPL-MCNC: 7.2 G/DL (ref 6.4–8.3)
SODIUM SERPL-SCNC: 139 MMOL/L (ref 136–145)
TRIGL SERPL-MCNC: 124 MG/DL

## 2023-06-05 PROCEDURE — 83036 HEMOGLOBIN GLYCOSYLATED A1C: CPT | Performed by: FAMILY MEDICINE

## 2023-06-05 PROCEDURE — 80061 LIPID PANEL: CPT | Performed by: FAMILY MEDICINE

## 2023-06-05 PROCEDURE — 36415 COLL VENOUS BLD VENIPUNCTURE: CPT | Performed by: FAMILY MEDICINE

## 2023-06-05 PROCEDURE — 90677 PCV20 VACCINE IM: CPT | Performed by: FAMILY MEDICINE

## 2023-06-05 PROCEDURE — 99215 OFFICE O/P EST HI 40 MIN: CPT | Mod: 25 | Performed by: FAMILY MEDICINE

## 2023-06-05 PROCEDURE — 80053 COMPREHEN METABOLIC PANEL: CPT | Performed by: FAMILY MEDICINE

## 2023-06-05 PROCEDURE — 90471 IMMUNIZATION ADMIN: CPT | Performed by: FAMILY MEDICINE

## 2023-06-05 RX ORDER — ATORVASTATIN CALCIUM 20 MG/1
20 TABLET, FILM COATED ORAL DAILY
Qty: 90 TABLET | Refills: 0 | Status: SHIPPED | OUTPATIENT
Start: 2023-06-05 | End: 2023-08-30

## 2023-06-05 ASSESSMENT — PAIN SCALES - GENERAL: PAINLEVEL: MODERATE PAIN (4)

## 2023-06-05 NOTE — PATIENT INSTRUCTIONS
Seen today for follow-up.  Prior right cerebellar stroke July 2022.  Felt was due to PFO since closed.  On aspirin 81 mg and no longer on statin.  Chronically low HDL and elevated triglycerides.  Will recheck lipids today and make recommendations ideally poststroke LDL should be less than 70.  Could consider CT calcium score to get more objective data of statin beneficial or not.  May discuss with [facility] cardiologist when gets echocardiogram with them in a month.    S/p PFO closure in November 2022 reports had arrhythmia that was not alarming in December or January since resolved.  Continue to monitor.    Blood pressure is looking better.    We will check hemoglobin A1c given history of prediabetes recommend low carbohydrate intake.    BMI stable at 27 continue to eat healthy and be active.    Migraines with visual aura no recent issues avoid triptans given stroke history.    Continue with B complex and supplements.    Resolved low platelets and low white count.    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    Vitiligo unchanged sun protection advised.    No recurrence of urticaria of unknown etiology.    Recovered from COVID October 2022 in addition to relapse.    ED on Cialis as needed, understands risk    Varicocele unchanged.    Varicose vein right leg may be contributing to discoloration right lower leg.  Unlikely to be causing ankle pain in the morning suspect that might be more from arthritis.  Recommend wearing compression socks during the day for 3 months and follow-up with vascular to assess if surgical treatment needed.  May discuss more with them.    Camp form for high intensity camp form filled.  Please see form fordetails, currently no restrictions noted.    Prevnar 20 given today.    Noted immunity to hepatitis B on prior titers & updated epic chart    Return in November December for routine preventive physical and sooner in an office visit  for any new concerns

## 2023-06-05 NOTE — PROGRESS NOTES
Prior to immunization administration, verified patients identity using patient s name and date of birth. Please see Immunization Activity for additional information.     Screening Questionnaire for Adult Immunization    Are you sick today?   No   Do you have allergies to medications, food, a vaccine component or latex?   No   Have you ever had a serious reaction after receiving a vaccination?   No   Do you have a long-term health problem with heart, lung, kidney, or metabolic disease (e.g., diabetes), asthma, a blood disorder, no spleen, complement component deficiency, a cochlear implant, or a spinal fluid leak?  Are you on long-term aspirin therapy?   No   Do you have cancer, leukemia, HIV/AIDS, or any other immune system problem?   No   Do you have a parent, brother, or sister with an immune system problem?   No   In the past 3 months, have you taken medications that affect  your immune system, such as prednisone, other steroids, or anticancer drugs; drugs for the treatment of rheumatoid arthritis, Crohn s disease, or psoriasis; or have you had radiation treatments?   No   Have you had a seizure, or a brain or other nervous system problem?   No   During the past year, have you received a transfusion of blood or blood    products, or been given immune (gamma) globulin or antiviral drug?   No   For women: Are you pregnant or is there a chance you could become       pregnant during the next month?   No   Have you received any vaccinations in the past 4 weeks?   No     Immunization questionnaire answers were all negative.      Injection of PCV given by Mikaela Kovacs MA. Patient instructed to remain in clinic for 15 minutes afterwards, and to report any adverse reactions.     Screening performed by Mikaela Kovacs MA on 6/5/2023 at 9:10 AM.      Answers for HPI/ROS submitted by the patient on 6/5/2023  What is the reason for your visit today? : stroke care follow up/health check  How many servings of fruits and  vegetables do you eat daily?: 2-3  On average, how many sweetened beverages do you drink each day (Examples: soda, juice, sweet tea, etc.  Do NOT count diet or artificially sweetened beverages)?: 1  How many minutes a day do you exercise enough to make your heart beat faster?: 20 to 29  How many days a week do you exercise enough to make your heart beat faster?: 3 or less  How many days per week do you miss taking your medication?: 1  What makes it hard for you to take your medication every day?: remembering to take

## 2023-06-05 NOTE — PROGRESS NOTES
Assessment & Plan     History of CVA (cerebrovascular accident)  Seen today for follow-up.  Prior right cerebellar stroke July 2022. History of right [facility] with resolved word finding difficulty and right-sided clumsiness. Seen by neurology and interventional neurologist at the [facility] post stroke. No known hypercoagulable state found.  Assessed to be cryptogenic thrombotic nature, no DVT found on ultrasound.  PFO found s/p closure November 14 at the [facility].  Notes feeling well. Stopped taking atorvastatin since 2 months after stroke. Was on 80 mg. Thinking 20 would be better. LDL was 95 in nov 2022 on no meds. Did send a message to [place] neurologist & was told the benefit of statin in stroke was not studied in his age group. As far as stroke prevention told not necessary. Will recheck lipids today. He understood the stroke was felt to be due to the PFO since closed.  Is on aspirin 81 mg and no longer on statin. Later after the visit labs showed total cholesterol, HDL and triglycerides looked good. LDL is 83.  Liver and gallbladder tests (ALT,AST, Alk phos,bilirubin)  & electrolytes are normal. See below.    Has had chronically low HDL and elevated triglycerides.  Will recheck lipids today and make recommendations ideally post stroke LDL should be less than 70.  Could consider CT calcium score to get more objective data of statin beneficial or not.  May discuss with [place] cardiologist when gets echocardiogram with them in a month. Later after the visit labs showed total cholesterol, HDL and triglycerides looked good. LDL is 83.  Liver and gallbladder tests (ALT,AST, Alk phos,bilirubin)  & electrolytes are normal. Given prior history of stroke recommended should be less than 70 ideally. I will send in atorvastatin 20 mg bedtime to his pharmacy number 90 pills and recommend rechecking fasting cholesterol on this dose in about 2 to 3 months before run out of the medication  to see if it makes a difference. The other option might be to consider doing a CT calcium score to assess coronary calcifications and objective risk to guide statin treatment or can discuss more with his cardiologist at the [facility] as well when sees  them in a month    S/p PFO closure in November 2022 reports had arrhythmia that was not alarming in December or January since resolved.  No records available. Continue to monitor.    Blood pressure is looking better.    We will check hemoglobin A1C given history of prediabetes recommend low carbohydrate intake. Later labs showed  Fasting glucose is slight elevated and may be a sign of early diabetes (prediabetes) though Hba1c was normal today. Advised eating a low carbohydrate diet, exercising, trying to lose weight (if necessary) and rechecking glucose level in 6 months.    BMI stable at 27 continue to eat healthy and be active.    Migraines with visual aura no recent issues, to avoid triptans given stroke history.    Continue with B complex and supplements. As mostly vegetarian    Resolved low platelets and low white count. Asymptomatic historically low counts in the past.    To [facility]-up with [facility] for right forearm lipoma that has become troublesome and for skin check given multiple pigmented nevi.    Vitiligo unchanged & sun protection advised.    No recurrence of urticaria of unknown etiology since April 2022. Was ruled out for vasculitis with Bx by derm.     Recovered from COVID October 2022 in addition to relapse. Wonder if that could have contributed to palpitations in dec and jan .    ED managed with Cialis 5 mg as needed #12 given with 11 refills in nov. Counseled about risk of flushing, headache, sinus infection, priapism. Avoid any alpha blockers on this medication.  If should have chest pain or EMS comes to the house or presents to ER needs to disclose if used this medication in the prior 48 hours to prevent being given meds that may cause  drop in blood pressure    Varicocele unchanged.    Varicose vein right leg may be contributing to discoloration right lower leg.  Unlikely to be causing ankle pain in the morning suspect that might be more from arthritis.  Recommend wearing compression socks during the day for 3 months and follow-up with vascular to assess if surgical treatment needed.  May discuss more with them. DME script for compression socks 15 to 20 mm hg given to try form a home medical store. Consider xray ankle and referral to ortho in the future if ankle symptoms get worse currently symptoms not suggestive of rheumatoid arthritis.     Camp form for high intensity [facility] backpacking hiking trip with scouts and 3 other adults form filled.  Please see form for details, currently no restrictions noted.  Pre participation physical form for [employer] [place] [employer]  [place] filled.  Noted the high adventurous advisory.  The [place] [place] [employer] experience was not risk-free however by taking responsibility for ones own health and safety and cooperating with staff it is expected that most participants will have an enjoyable safe [place] [employer] experience.  Summer temperatures could go 30 to 100 degrees low humidity and frequent sometimes severe thunderstorms.  For summer hikes each participant must be able to carry 25 to 35% of their own body weight & be able to hike 5 to 12 miles per day [place] and elevations range from 6500 to 12,500 feet or trails that are steep and sid.  He does not have any special dietary needs.  Tetanus is up-to-date as is COVID.  Is immune to hepatitis A and noted has immunity to hepatitis B from prior vaccination when younger, pneumonia vaccine given today.  Vaccinations such as MMR varicella and meningococcal disease is recommended but currently not mandatory, there is no records of these in our chart or in [facility] not noted on his immunization record.  There is no allergy or anaphylaxis.  Unexplained urticaria last year has not  recurred.  He needs to take enough medication for the duration of the trip.  And be aware that altitude heat exertion can affect the medications efficacy.  There is no history of seizures or hypertension or diabetes or asthma.  He has had a stroke in the past reported by him that neurology said it was due to PFO which has since been closed.  There is no cardiac disease per se.  He has had no recent musculoskeletal injuries or orthopedic surgery.  He does have varicose veins on the right with some achiness in the right ankle in the morning that resolves with activity.  He has been advised to use compression socks.  There is no psychological or emotional condition currently.  His BMI is 27 he does not exceed the maximum acceptable weight limit for his height.  He does exceed the weight limit for horseback riding which is 200 pounds and he is 205 pounds.  Staff and physicians at the [place] reserve the right to deny the participation of any individual based on a physical exam and a medical history.  Each participant is subject to medical recheck at [facility].  Currently he is on aspirin 81 mg daily and takes supplements vitamin D, B12 routinely & fish oil, B complex, and vitamin C and Cialis as needed.  He has a lipoma right forearm and varicose veins right leg and currently I certify that he has no contraindication for participation in a scouting experience.  Including the high adventure program.  Form was filled signed stamped original given to patient and copy made for our records.    Prevnar 20 given today.    Noted immunity to hepatitis B on prior titers & updated epic chart    To return in November/ December for routine preventive physical and sooner in an office visit for any new concerns  - Hemoglobin A1C; Future  - Lipid Profile (Chol, Trig, HDL, LDL calc); Future  - Comprehensive metabolic panel (BMP + Alb, Alk Phos, ALT, AST, Total. Bili, TP); Future  - Hemoglobin A1C  - Lipid Profile (Chol, Trig, HDL, LDL  calc)  - Comprehensive metabolic panel (BMP + Alb, Alk Phos, ALT, AST, Total. Bili, TP)  - atorvastatin (LIPITOR) 20 MG tablet; Take 1 tablet (20 mg) by mouth daily  - Lipid panel reflex to direct LDL Fasting; Future    Abnormally low high density lipoprotein (HDL) cholesterol with hypertriglyceridemia  Has had chronically low HDL and elevated triglycerides.  Will recheck lipids today and make recommendations ideally post stroke LDL should be less than 70.  Could consider CT calcium score to get more objective data of statin beneficial or not.  May discuss with [place] cardiologist when gets echocardiogram with them in a month. Later after the visit labs showed total cholesterol, HDL and triglycerides looked good. LDL is 83.  Liver and gallbladder tests (ALT,AST, Alk phos,bilirubin)  & electrolytes are normal. Given prior history of stroke recommended should be less than 70 ideally. I will send in atorvastatin 20 mg bedtime to his pharmacy number 90 pills and recommend rechecking fasting cholesterol on this dose in about 2 to 3 months before run out of the medication to see if it makes a difference. The other option might be to consider doing a CT calcium score to assess coronary calcifications and objective risk to guide statin treatment or can discuss more with his cardiologist at the [facility] as well when sees  them in a month  - Lipid Profile (Chol, Trig, HDL, LDL calc); Future  - Lipid Profile (Chol, Trig, HDL, LDL calc)    S/P patent foramen ovale closure  S/p PFO closure in November 2022 reports had arrhythmia that was not alarming in December or January since resolved.  No records available. Continue to monitor. Blood pressure is looking better. Recovered from COVID October 2022 in addition to relapse. Wonder if that could have contributed to palpitations in dec and jan .    Prediabetes  We will check hemoglobin A1C given history of prediabetes recommend low carbohydrate intake. Later labs showed  Fasting  glucose is slight elevated and may be a sign of early diabetes (prediabetes) though Hba1c was normal today. Advised eating a low carbohydrate diet, exercising, trying to lose weight (if necessary) and rechecking glucose level in 6 months.  - Hemoglobin A1C; Future  - Comprehensive metabolic panel (BMP + Alb, Alk Phos, ALT, AST, Total. Bili, TP); Future  - Hemoglobin A1C  - Comprehensive metabolic panel (BMP + Alb, Alk Phos, ALT, AST, Total. Bili, TP)    Elevated blood pressure reading without diagnosis of hypertension  BP wnl today   - Comprehensive metabolic panel (BMP + Alb, Alk Phos, ALT, AST, Total. Bili, TP); Future  - Comprehensive metabolic panel (BMP + Alb, Alk Phos, ALT, AST, Total. Bili, TP)    Overweight  BMI stable at 27 continue to eat healthy and be active.  - Hemoglobin A1C; Future  - Lipid Profile (Chol, Trig, HDL, LDL calc); Future  - Comprehensive metabolic panel (BMP + Alb, Alk Phos, ALT, AST, Total. Bili, TP); Future  - Hemoglobin A1C  - Lipid Profile (Chol, Trig, HDL, LDL calc)  - Comprehensive metabolic panel (BMP + Alb, Alk Phos, ALT, AST, Total. Bili, TP)    Migraine with aura and without status migrainosus, not intractable  Migraines with visual aura no recent issues, to avoid triptans given stroke history.  - Comprehensive metabolic panel (BMP + Alb, Alk Phos, ALT, AST, Total. Bili, TP); Future  - Comprehensive metabolic panel (BMP + Alb, Alk Phos, ALT, AST, Total. Bili, TP)    Vegetarian  Continue with B complex and supplements. As mostly vegetarian  Resolved low platelets and low white count. Asymptomatic historically low counts in the past.    Lipoma of right forearm  Vitiligo  To [facility] for right forearm lipoma that has become troublesome and for skin check given multiple pigmented nevi.  Vitiligo unchanged & sun protection advised.  No recurrence of urticaria of unknown etiology since April 2022. Was ruled out for vasculitis with Bx by derm.     Erectile  dysfunction, unspecified erectile dysfunction type  Varicocele  ED managed with Cialis 5 mg as needed #12 given with 11 refills in nov. Counseled about risk of flushing, headache, sinus infection, priapism. Avoid any alpha blockers on this medication.  If should have chest pain or EMS comes to the house or presents to ER needs to disclose if used this medication in the prior 48 hours to prevent being given meds that may cause drop in blood pressure  Varicocele unchanged.    Varicose veins of right lower extremity, unspecified whether complicated  Varicose vein right leg may be contributing to discoloration right lower leg.  Unlikely to be causing ankle pain in the morning suspect that might be more from arthritis.  Recommend wearing compression socks during the day for 3 months and follow-up with vascular to assess if surgical treatment needed.  May discuss more with them. DME script for compression socks 15 to 20 mm hg given to try form a home medical store. Consider xray ankle and referral to ortho in the future if ankle symptoms get worse currently symptoms not suggestive of rheumatoid arthritis.   - Compression Sleeve/Stocking Order for DME - ONLY FOR DME  - Vascular Surgery Referral; Future    Right ankle pain, unspecified chronicity  Try compression socks incase varicose veins causing ankle pain see vascular and if that's not the answer then consider xray ankle and referral to ortho in the future if ankle symptoms get worse currently symptoms not suggestive of rheumatoid arthritis.   - Vascular Surgery Referral; Future    Encounter for other admin exam   Camp form for high intensity camp [place] backpacking hiking trip with scouts and 3 other adults form filled.  Please see form for details, currently no restrictions noted.  Pre participation physical form for [place]  [employer] filled.  Noted the high adventurous advisory.  The [place] experience was not risk-free however by taking responsibility for  ones own health and safety and cooperating with staff it is expected that most participants will have an enjoyable safe [place] experience.  Summer temperatures could go 30 to 100 degrees low humidity and frequent sometimes severe thunderstorms.  For summer hikes each participant must be able to carry 25 to 35% of their own body weight & be able to hike 5 to 12 miles per [facility] and elevations range from 6500 to 12,500 feet or trails that are steep and sid.  He does not have any special dietary needs.  Tetanus is up-to-date as is COVID.  Is immune to hepatitis A and noted has immunity to hepatitis B from prior vaccination when younger, pneumonia vaccine given today.  Vaccinations such as MMR varicella and meningococcal disease is recommended but currently not mandatory, there is no records of these in our chart or in [facility] not noted on his immunization record.  There is no allergy or anaphylaxis.  Unexplained urticaria last year has not recurred.  He needs to take enough medication for the duration of the trip.  And be aware that altitude heat exertion can affect the medications efficacy.  There is no history of seizures or hypertension or diabetes or asthma.  He has had a stroke in the past reported by him that neurology said it was due to PFO which has since been closed.  There is no cardiac disease per se.  He has had no recent musculoskeletal injuries or orthopedic surgery.  He does have varicose veins on the right with some achiness in the right ankle in the morning that resolves with activity.  He has been advised to use compression socks.  There is no psychological or emotional condition currently.  His BMI is 27 he does not exceed the maximum acceptable weight limit for his height.  He does exceed the weight limit for horseback riding which is 200 pounds and he is 205 pounds.  Staff and physicians at the [place] reserve the right to deny the participation of any individual based on a  physical exam and a medical history.  Each participant is subject to medical recheck at [place].  Currently he is on aspirin 81 mg daily and takes supplements vitamin D, B12 routinely & fish oil, B complex, and vitamin C and Cialis as needed.  He has a lipoma right forearm and varicose veins right leg and currently I certify that he has no contraindication for participation in a scouting experience.  Including the high adventure program.  Form was filled signed stamped original given to patient and copy made for our records.    Need for pneumococcal vaccine  Prevnar 20 given today.  Noted immunity to hepatitis B on prior titers & updated epic chart  To return in November/ December for routine preventive physical and sooner in an office visit for any new concerns  - PNEUMOCOCCAL 20 VALENT CONJUGATE (PREVNAR 20)    Review of the result(s) of each unique test - dx since nov 2022  Diagnosis or treatment significantly limited by social determinants of health - limited records from [place]  Ordering of each unique test  Prescription drug management  54 minutes spent by me on the date of the encounter doing chart review, history and exam, documentation and further activities per the note       BMI:   Estimated body mass index is 27.8 kg/m  as calculated from the following:    Height as of this encounter: 1.829 m (6').    Weight as of this encounter: 93 kg (205 lb).   Weight management plan: Discussed healthy diet and exercise guidelines    Work on weight loss  Regular exercise  See Patient Instructions    Izzy Hutson MD  [facility] ARMAAN Mar is a 47 year old, presenting for the following health issues:  RECHECK (Stroke and lipid/)        6/5/2023     8:09 AM   Additional Questions   Roomed by odilia fried   Accompanied by none     Forms 6/5/2023   Any forms needing to be completed Yes         6/5/2023     8:09 AM   Patient Reported Additional Medications   Patient reports taking the  following new medications none     History of Present Illness       Reason for visit:  Stroke care follow up/health check    He eats 2-3 servings of fruits and vegetables daily.He consumes 1 sweetened beverage(s) daily.He exercises with enough effort to increase his heart rate 20 to 29 minutes per day.  He exercises with enough effort to increase his heart rate 3 or less days per week. He is missing 1 dose(s) of medications per week.  He is not taking prescribed medications regularly due to remembering to take.     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, with history of right cerebellar stroke with word finding difficulty and right-sided clumsiness in July 2022 resolved with time, negative hypercoagulable work-up, work-up did not reveal a PFO s/p closure at [facility] November 14, 2022, hx of cervical radiculitis treated with Flexeril prn in the past, hx of migraine with aura secondary to dehydration in 2017, and advised to avoid triptans due to history of stroke, elevated TG in past, low HDL, prediabetes,  hx of resolved esophageal reflux, vitiligo noted since 2008, lipoma, left back s/p excision 4/2021, has lipoma right forearm, vitiligo, multiple pigmented nevi under care of dermatology, hx of impaired glucose, prediabetes, history of low platelets, low white count in the past, varicocele, ED on Cialis as needed, on Vit B,C, D, fish oil and lutein, prior noted ulcer with NSAID use, history of COVID, is a vegetarian, previously under care of PCP Dr Marino,   Seen by PCP 2/10/21 for left neck and shoulder pain, Dx with left cervical radiculopathy and advised PT and given Flexeril. U/S done 2/15 for large subcutaneous mass left lateral upper back showed a lipoma, referred to the surgeon, seen 3/22/21 and noted to get surgically removed under local anesthesia.    Seen first time on 4/14/2021 by this provider for a preop for lipoma excision under local. Camp physical & preventive labs done. Addressed hx  of impaired glucose, elevated TG in past, BMI, vitiligo hx and labs done showed glucose of 104, Hba1c was normal at 5.5, negative for Hep C, HIV, normal TSH, Low HDL, LDL & TG were normal, Rest of BMP normal. CBC showed stable low wbc and platelets of 145 previously 115. Reminded to work on advance directives. Was to continue routine care & preventive physical with PCP Dr Marino. Had lipoma excision left back on 4/26/21.   Seen 8/11/21 for preventive health and additional concerns.  Advised self testicular check regularly. Deferred checking labs done in April . Cbc done due to low wbc & platelets in the past. Noted had recovered from lipoma excision & coincidentally the nerve pain he'd had prior to that resolved 80 % after surgery. Felt the left neck pinched nerve was better doing some strength training, & stretching helped & declined need to see ortho. Had completed PT around same time had excision of lipoma and strangely noted improvement after lipoma excision. Noted had gone mostly vegetarian since feb, eliminated 80 percent red meat, was eating some fish and chicken. BMI down from 29 to 27. Vitiligo was unchanged, had done UVB Rx 5 yrs prior & seen derm also for hx of prior atypical nevi on Bx. Due for a follow up & was to schedule with derm on his own. Hx of varicocele not bothering him, dx in high school, dad of three. Had a Lipoma right forearm not bothering him, unchanged. Was to monitor. Everyone in family vaccinated against Covid. Hx of vit d def in past, took in the winter time. Health care maintenance reviewed. Was to follow up with the dentist. For colon cancer screening, colonoscopy ordered.   Vitamin D came back low ferritin and B12 normal and advise increase vitamin D in diet.  WBC and platelets were low but stable to prior and possibly his new baseline.  Colonoscopy done 11/29/2021 was normal and advised to recheck in 10 years.  Call to get checked for testosterone 12/8/2021 request sent in and this  came back normal.  On 2/9 communicated via [place] about numbness and tingling right hand 3 to 5 days.  Referred to orthopedics and physical therapy.  Opted to hold off on physical therapy seen by neurosurgeon on 2/14/2022 MRI C-spine advised to consider C-spine traction.  MN  oxycodone 5 mg # 10 on 4/26/21     Seen 3/7/22 for numbness tingling right hand, right arm pain, hx of cervical radiculopathy & right upper back pain between scapula and spine that started after episode of snowboarding in December and had persisted, seen by neurosurgeon and MRI C-spine to be done  For ED noted testosterone in the fall had been normal.  Vitamin D was normal, B12, CMP normal other than elevated glucose TSH was normal.  Hemoglobin A1C in prediabetic range.  CBC showed chronically low platelets and white count not different from before.  Felt stress and age-related.  Urology referral given.  Discussed options and felt Cialis covered by insurance.  Prescribed to take 5 mg half an hour prior to sexual activity as needed.  May be effective for 36 hours after single dose.  Do not split dose.  Counseled about risk of flushing, headache, sinus infection, priapism. Avoid any alpha blockers on this medication.  If should have chest pain or EMS comes to the house or presents to ER needs to disclose if used this medication in the prior 48 hours to prevent being given meds that may cause drop in blood pressure.  Given number 12 tablets to try if not helpful could consider next dose up.   Was to continue care with dermatology regarding vitiligo.  Continue with sun protection. Check vitamin D and CBC given prior history of low white count platelets and vitamin D.  Had an elevated blood pressure.  Recheck was borderline.  This was new. Felt related to stress and pain.  To check at home and if consistently over 130 x 80 to give us a call.  Decrease salt in diet, limit alcohol to 1 drink in a 24 hr time frame.  No more than 7 total a week,  avoid anti-inflammatories as much as possible.  Limit caffeine intake.  Stay active and work on losing a few pounds.  Recheck at upcoming physical  Due to elevated blood pressure, low HDL elevated triglycerides, BMI 28, family history of heart disease discussed and referred to preventive cardiology for evaluation.  Last physical noted  done in August.  But needed a camp physical done in May and June so could make a big hiking camping trip in July.      MRI C-spine done 3/7/2022 showed multilevel cervical spondylosis mostly at C6-7 with moderate to severe neural foraminal stenosis without evidence of spinal stenosis.  Referred to PT did traction and that helped and was discharged from physical therapy/11/22.  On 4/26/2022 pre participation camp physical form filled based on 2021 physical visit.     On 7/22/2022 seen at [facility] ER for word finding difficulty right-sided clumsiness and work-up revealed acute cerebellar right-sided CVA and TTE showed positive bubble study showing a PFO with a negative hypercoagulable work-up.  Seen at [facility] and ultimately evaluated to have a cryptogenic stroke with negative hypercoagulable work-up, negative labs, negative Dopplers, a moderate sized PFO and was referred to interventional cardiology and had PFO closure done November 14 at [facility]. In between also had COVID October 1, 2022 treated with Paxlovid and had a rebound after completed med that resolved on its own.    Seen 11/28/22 for preventive health and additional concerns. Reviewed the history of right cerebellar stroke in July dx at [facility] er with resolved word finding difficulty and right-sided clumsiness improved.  Writing was not back to baseline but improved from before.  Seen by neurology and interventional neurologist at the [facility] post stroke. No known hypercoagulable state found.  Assessed to be cryptogenic thrombotic nature, no DVT found on ultrasound.  PFO found s/p closure November 14 at the [facility]. Felt was doing well.  Was to follow-up with cardiology as planned in a few weeks at [facility], to discuss loop recorder with them.  Was to continue aspirin 81 mg daily. Recommended continuing statin may be not at 80 mg dosing given post stroke but to see what lipids showed and go from there. Had a history of historically low HDL and hypertriglyceridemia likely genetic. Counseled that statins decrease endothelial damage and cardiovascular risk. Blood pressure was looking improved. History of migraines with visual aura to avoid all triptans given stroke history. To stay well-hydrated as dehydration appeared to be migraine trigger historically. Remained vegetarian to continue with B complex. Weight had improved, to continue to work on eating healthy and keep BMI in a healthy range. History of historically low platelets and white cell count that was asymptomatic. Lipoma right forearm and multiple pigmented nevi under care of  Dermatology. History of vitiligo unchanged to follow-up with dermatology as planned. Hx of Urticaria of unknown cause ruled out for vasculitis in April 2022 by dermatology. History of COVID s/p Paxlovid in October with rebound COVID symptoms since resolved. Varicocele unchanged and asymptomatic. ED managed with Cialis 5 mg as needed #12 given with 11 refills. Was prescribed to take 5 mg half an hour prior to sexual activity as needed.  Felt could be effective for 36 hours after a single dose.  Advised to not split dose.  Counseled about risk of flushing, headache, sinus infection, priapism. Avoid any alpha blockers on this medication.  If should go to the ER or EMS came to the house needed to disclose if used this medication in the prior 48 hours to prevent being given meds that could cause a drop in blood pressure.       Healthcare maintenance reviewed. Noted had ACP at home and on file. Colon cancer screening due in 2031. Flu shot & Moderna bivalent booster given & to consider Prevnar  20 given history of PFO. To consider hepatitis B vaccination if immune testing showed was not immune.  These later came back showing immunity. If traveling outside the country recommend being seen by the travel clinic. Was to return in 1 year for preventive physical or sooner in an office visit for any new  Concerns.   Labs showed normal Microalbumin, , HDL low, LDL 95, CMP with elevated glucose, normal TSH, B12, cbc & Hep B immunity positive. Hba1c elevated at 5.8.     CURRENTLY  Here for follow up  History of right cerebellar stroke in July dx at [facility] er with resolved word finding difficulty and right-sided clumsiness. Seen by neurology and interventional neurologist at the [facility] post stroke. No known hypercoagulable state found.  Assessed to be cryptogenic thrombotic nature, no DVT found on ultrasound.  PFO found s/p closure November 14 at the [facility].  Notes feeling well. Stopped taking atorvastatin since 2 months after stroke. Was on 80 mg. Thinking 20 would be better. LDL was 95 in nov 2022 on no meds. Did send a message to [place] neurologist & was told the benefit of statin in stroke was not studied in his age group. As far as stroke prevention told not necessary. Will recheck lipids today.     History of historically low HDL and hypertriglyceridemia likely genetic.  Is trying to do more exercise  Discussed considering a ct calcium score. Has a follow up apt in [place] with echo in 1 month who did PFO closure & will discuss with them     S/p PFO closure, to see brunner cards in 1 month. Notes end of 2022 noted had funny arrhythmias, was this was normal in 6 to 7 % in patients post PFO closure. Felt it mid dec to jan. [place] heart fluttering and did a portable EKG , told no concerning findings. Lasted 1 month and ended two months after surgery, consistent with what was told could happen post surgery, was self limited. No notes in epic or care every where about any of this. Thinks had  sinus rhythm  with supraventricular ectopy noted on his phone , One ? A fib  And pvcs in jan, would feel mostly when laid down in bed but not had it since jan 2023. Heart rate on low side chronically, & has been asymptomatic & working out without any symptoms.     Blood pressure is wnl     Prediabetes will check labs today. Notes blood sugar itself while in hospital checked 4/ day was normal. Feels 80 % of his diet is vegetarian. Is careful of meat alternatives not being all carb's    BMI stable 27 Weight has improved continues to work on eating healthy..    History of migraines with visual aura so avoids all triptans given stroke history.  Tries to stay well hydrated as dehydration was a migraine trigger in the past.    Mostly vegetarian diet & on a B complex.    History of historically asymptomatic low platelets and white cell count     Lipoma right forearm getting more troublesome when rests arm on desk. Has apt with derm consultants end of month downstairs to check skin given multiple pigmented nevi and will discuss with them about removal.    History of vitiligo unchanged sees dermatology as planned.    Hx of urticaria of unknown cause ruled out for vasculitis in April 2022 by dermatology. No recurrence    History of COVID s/p Paxlovid in October 2022 with rebound COVID symptoms since resolved.    Varicocele unchanged and asymptomatic.    ED managed with Cialis 5 mg as needed #12 given with 11 refills in nov. Counseled about risk of flushing, headache, sinus infection, priapism. Avoid any alpha blockers on this medication.  If should have chest pain or EMS comes to the house or presents to ER needs to disclose if used this medication in the prior 48 hours to prevent being given meds that may cause drop in blood pressure.      varicocele chronic not bothersome.    Varicose vein right lower leg, with some discoloration right ankle lower shin   Feels right ankle is painful in am but then clears up soon after waking. No redness,  or swelling or warmth     Needs a camp physical form for  related trip in aug. 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      Objective    /75 (BP Location: Right arm, Patient Position: Chair, Cuff Size: Adult Large)   Pulse 63   Temp 97.6  F (36.4  C) (Temporal)   Resp 15   Ht 1.829 m (6')   Wt 93 kg (205 lb)   SpO2 98%   BMI 27.80 kg/m    Body mass index is 27.8 kg/m .  Physical Exam   GENERAL: healthy, alert, no distress and over weight  EYES: Eyes grossly normal to inspection, PERRL and conjunctivae and sclerae normal  HENT: ear canals and TM's normal, nose and mouth without ulcers or lesions  NECK: no adenopathy, no asymmetry, masses, or scars and thyroid normal to palpation  RESP: lungs clear to auscultation - no rales, rhonchi or wheezes  CV: regular rates and rhythm, normal S1 S2, no S3 or S4, no murmur, click or rub, peripheral pulses strong, no peripheral edema and varicosities right more than left  ABDOMEN: soft, nontender, no hepatosplenomegaly, no masses and bowel sounds normal  MS: normal muscle tone, normal range of motion, no cyanosis, clubbing, or edema, varicose veins tortuous bulging varicose vein noted below right medial knee and medial aspect of lower leg, hemosiderin deposits right lower shin, no edema, peripheral pulses normal and RLE exam shows normal strength and muscle mass, no deformities, no erythema, induration, or nodules, no evidence of joint effusion, ROM of all joints is normal, no evidence of joint instability and normal right ankle joint mobility  SKIN: no suspicious lesions or rashes, has vitiligo, tattoos, right mid forearm lipoma 2  cm dorsum ulnar edge of forearm, multiple pigmented nevi  NEURO: Normal strength and tone, mentation intact and speech normal  PSYCH: mentation appears normal, affect normal/bright    Results for orders placed or performed in visit on 06/05/23   Hemoglobin A1c     Status: Normal   Result Value Ref Range    Hemoglobin A1C 5.5 0.0 - 5.6 %   Lipid Profile (Chol, Trig, HDL, LDL calc)     Status: Abnormal   Result Value Ref Range    Cholesterol 143 <200 mg/dL    Triglycerides 124 <150 mg/dL    Direct Measure HDL 35 (L) >=40 mg/dL    LDL Cholesterol Calculated 83 <=100 mg/dL    Non HDL Cholesterol 108 <130 mg/dL    Narrative    Cholesterol  Desirable:  <200 mg/dL    Triglycerides  Normal:  Less than 150 mg/dL  Borderline High:  150-199 mg/dL  High:  200-499 mg/dL  Very High:  Greater than or equal to 500 mg/dL    Direct Measure HDL  Female:  Greater than or equal to 50 mg/dL   Male:  Greater than or equal to 40 mg/dL    LDL Cholesterol  Desirable:  <100mg/dL  Above Desirable:  100-129 mg/dL   Borderline High:  130-159 mg/dL   High:  160-189 mg/dL   Very High:  >= 190 mg/dL    Non HDL Cholesterol  Desirable:  130 mg/dL  Above Desirable:  130-159 mg/dL  Borderline High:  160-189 mg/dL  High:  190-219 mg/dL  Very High:  Greater than or equal to 220 mg/dL   Comprehensive metabolic panel (BMP + Alb, Alk Phos, ALT, AST, Total. Bili, TP)     Status: Abnormal   Result Value Ref Range    Sodium 139 136 - 145 mmol/L    Potassium 4.1 3.4 - 5.3 mmol/L    Chloride 107 98 - 107 mmol/L    Carbon Dioxide (CO2) 24 22 - 29 mmol/L    Anion Gap 8 7 - 15 mmol/L    Urea Nitrogen 14.0 6.0 - 20.0 mg/dL    Creatinine 0.93 0.67 - 1.17 mg/dL    Calcium 9.2 8.6 - 10.0 mg/dL    Glucose 107 (H) 70 - 99 mg/dL    Alkaline Phosphatase 91 40 - 129 U/L    AST 29 10 - 50 U/L    ALT 32 10 - 50 U/L    Protein Total 7.2 6.4 - 8.3 g/dL    Albumin 4.5 3.5 - 5.2 g/dL    Bilirubin Total 0.5 <=1.2 mg/dL    GFR Estimate >90 >60 mL/min/1.73m2     No results found for  this or any previous visit (from the past 24 hour(s)).

## 2023-06-08 ENCOUNTER — MYC MEDICAL ADVICE (OUTPATIENT)
Dept: FAMILY MEDICINE | Facility: CLINIC | Age: 48
End: 2023-06-08

## 2023-06-08 DIAGNOSIS — Z86.73 HISTORY OF CVA (CEREBROVASCULAR ACCIDENT): ICD-10-CM

## 2023-07-11 NOTE — RESULT ENCOUNTER NOTE
Kuldeep Mr. Xie,  Some of your results came back and are within acceptable limits.   No anemia: normal hemoglobin & hematocrit  Wbc on lower end but not significant  Platelets are low  At 145. But in 2011 at [facility] when you last had a cbc that I could find they were lower at 115. Currently at this value there is no bleeding risk but monitor for easy bruisability, bleeding etc & can recheck then to make sure not trended down  If you have any further concerns please do not hesitate to contact us by message, phone or making an appointment.  Have a good day   Dr Haresh GUERRERO severe

## 2023-08-30 RX ORDER — ATORVASTATIN CALCIUM 20 MG/1
20 TABLET, FILM COATED ORAL DAILY
Qty: 90 TABLET | Refills: 3 | Status: SHIPPED | OUTPATIENT
Start: 2023-08-30 | End: 2024-06-03

## 2023-08-31 NOTE — TELEPHONE ENCOUNTER
ANA MARIA Mar  Thanks for the update  I was able to review the [facility] note visit & see the results in careevery where.  Good to know the ldl and tg are improved from june when last checked on the atorvastatin dose.   There is then no need to repeat a lipid panel this year. I'll cancel that lab ordered pended in the system  Your physical is due this fall / dec. We can see you then and discuss more if you want  In mean while I'll refill the atorvastatin at 20 mg bedtime for a yr.

## 2023-10-30 ENCOUNTER — PATIENT OUTREACH (OUTPATIENT)
Dept: CARE COORDINATION | Facility: CLINIC | Age: 48
End: 2023-10-30
Payer: COMMERCIAL

## 2023-11-13 ENCOUNTER — PATIENT OUTREACH (OUTPATIENT)
Dept: CARE COORDINATION | Facility: CLINIC | Age: 48
End: 2023-11-13
Payer: COMMERCIAL

## 2023-11-29 ENCOUNTER — OFFICE VISIT (OUTPATIENT)
Dept: VASCULAR SURGERY | Facility: CLINIC | Age: 48
End: 2023-11-29
Attending: FAMILY MEDICINE
Payer: COMMERCIAL

## 2023-11-29 VITALS
HEART RATE: 68 BPM | DIASTOLIC BLOOD PRESSURE: 76 MMHG | HEIGHT: 72 IN | SYSTOLIC BLOOD PRESSURE: 116 MMHG | BODY MASS INDEX: 28.39 KG/M2 | WEIGHT: 209.6 LBS | TEMPERATURE: 98.2 F | RESPIRATION RATE: 16 BRPM

## 2023-11-29 DIAGNOSIS — I83.91 VARICOSE VEINS OF RIGHT LOWER EXTREMITY, UNSPECIFIED WHETHER COMPLICATED: Primary | ICD-10-CM

## 2023-11-29 DIAGNOSIS — M25.571 RIGHT ANKLE PAIN, UNSPECIFIED CHRONICITY: ICD-10-CM

## 2023-11-29 PROCEDURE — 99214 OFFICE O/P EST MOD 30 MIN: CPT | Performed by: HOSPITALIST

## 2023-11-29 PROCEDURE — 99203 OFFICE O/P NEW LOW 30 MIN: CPT | Performed by: HOSPITALIST

## 2023-11-29 RX ORDER — UBIDECARENONE 100 MG
CAPSULE ORAL
COMMUNITY
Start: 2023-09-01

## 2023-11-29 ASSESSMENT — PAIN SCALES - GENERAL: PAINLEVEL: NO PAIN (0)

## 2023-11-29 NOTE — PATIENT INSTRUCTIONS
Kuldeep Mar,    Thank you for entrusting your care with us today. After your visit today with MD Shira Mitchell this is the plan that was discussed at your appointment.    Wear graduated compression socks 20-30 mmHg daily.  See below for more information on use.    See podiatry for ankle pain.     Follow up as needed with new or worsening symptoms.    I am including additional information on these things and our contact information if you have any questions or concerns.   Please do not hesitate to reach out to us if you felt we did not answer your questions or you are unsure of the treatment plan after your visit today. Our number is [phone].Thank you for trusting us with your care.         Again thank you for your time.     Wear your compression stockings every day; put them on first thing in the morning and remove at bedtime    Replace your compression stockings every 4-6 months; these garments will lose their elasticity and become ineffective    Hang your stockings, do not put them in the dryer.  This will help prolong the life of your compressions stockings.

## 2023-11-29 NOTE — PROGRESS NOTES
VASCULAR MEDICINE CONSULT NOTE          LOCATION:  [facility]         Date of Service: 11/29/2023      Primary Care Provider: Izzy Hutson  Referring provider;  Izzy Hutson      Reason for the visit/chief complaint:   Varicose veins      HPI:  Zaid Xie is a pleasant 48 year old male who presents to our Vascular Medicine for varicose veins as referral from his PCP Izzy Hyatt.    Mr. Xie has prior history of unexplained cerebellar stroke July 2022 with large right-to-left shunting now s/p PFO closure Nov 14. 2022, vitiligo and varicocele.    With regards to his varicose veins, he has right inner leg varicose veins that has not certainly noticed it until it was noted by his PCP during evaluation for right ankle pain and discoloration.  His right ankle pain has been happening mostly in the morning when he wakes up and walks, it feels like tightness and stiffness in the heel/ankle joint.  Improves within few minutes of walking.  On the other hand, he has noticed mild swelling to his right lower extremity by the end of the day that is typically not for some.  Denies leg pain, cramping or heaviness.  Denies any pain over his dilated or varicose veins. No symptoms on the left.  Denies any previous blood clots.  He was suggested to try compression stockings to see if this will help with his symptoms however he is yet to be prescribed any or try any.    With regards to his varicocele, reports this was picked up to bring regular exam in high school.  It has not caused him any trouble throughout the years. He doesn't know on which side he has it. No issues with fertility.    Social history: He is not a smoker.  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  He is pretty active otherwise and tries to exercise regularly.    Family history: Denies family history of varicose veins.   Interestingly, reports family history of stroke in his mother at age 56 with PFO status postclosure.  Also history of AAA and his dad who was not a smoker (under surveillance, did not undergo repair).        REVIEW OF SYSTEMS:    A 12 point ROS was reviewed and is negative except what is mentioned in HPI.       Past medical history, surgical history, medications, family history, social history and allergies were reviewed. Pertinent points mentioned under HPI.      OBJECTIVE:    Vital signs:  /76   Pulse 68   Temp 98.2  F (36.8  C)   Resp 16   Ht 6' (1.829 m)   Wt 209 lb 9.6 oz (95.1 kg)   BMI 28.43 kg/m    Wt Readings from Last 1 Encounters:   11/29/23 209 lb 9.6 oz (95.1 kg)     Body mass index is 28.43 kg/m .    Physical exam:  General appearance: Pleasant male in no apparent distress.    HEENT: NC/AT.    Neck: No jugular venous distension.   Heart: RRR. Normal S1, S2. No murmur, rub, click, or gallop.   Chest: Clear to auscultation bilaterally.  Abdomen: Soft, nontender, nondistended. No dilated veins.  Extremities: No lower extremity swelling noted on today's exam.  On the right lower extremity there is varicose veins noted on the medial distal thigh and upper calf.  Easily compressible.  No tenderness.  No redness.  No warmth.  No cordlike feeling.  Palpable DP and PT 2/2 bilaterally.  Skin: Corona phlebectatica noted on the right ankle area.  No significant stasis dermatitis, lipodermatosclerosis or skin wounds.  Neurological: Alert, awake and oriented               DIAGNOSTIC STUDIES:   Labs and diagnostics reviewed including outside records. Pertinent points are mentioned under HPI and assessment and plan sections.        ASSESSMENT AND PLAN:    Mildly symptomatic right calf varicose veins, CEAP: C2  Long history of asymptomatic varicocele  History of unexplained cerebellar stroke July 2022 with large PFO status postclosure November 2022  Family history of AAA    Mr. Xie has mildly symptomatic  right calf varicose veins.  His symptoms are limited to mild swelling by the end of the day and has some evidence of corona phlebectatica on his right ankle.  His right ankle pain is certainly not consistent with venous etiology and I have recommended he get evaluated by podiatry.    We spent some time today discussing in general venous insufficiency and varicose veins.  We discussed risk factors, clinical features and complications.  He has no significant risk factors for varicose veins such as smoking, family history, obesity, lifestyle or extremely tall stature, etc. we briefly discussed about possible venous compression syndromes.  However, given the very mild symptoms, further evaluating this will be low yield.  We overall discussed indications for stenting and these compression syndromes are always guided by severe symptoms such as leg swelling, heaviness or cramping that does not improve with compression stockings, recurrent DVTs, etc.  In addition, we discussed indications for venous ablation for varicose veins (recurrent phlebitis or thrombophlebitis, symptoms that are not well-controlled with conservative therapy or compression stockings or advanced skin changes and venous ulcers).  He currently does not meet any of these criteria.    At this point, I encouraged him to continue conservative management.  We will be prescribing knee-high graduated 20-30 mmHg compression stockings.  That he can wear it most days to alleviate symptoms, the more the better.  We also discussed the importance of continuing to become active, exercises that involve calf muscle pumps and maintaining healthy weight.      Recommendations:  Start wearing knee-high graduated 20-30 mmHg compression stocking.  Discussed other conservative therapy such as leg elevation, avoiding prolonged sitting and standing, calf muscle pump exercises and maintaining healthy weight.  Follow-up as needed.  Patient was encouraged to call our clinic if his  symptoms start getting any worse despite wearing compression stockings or should he develop any complications as mentioned above.  Would recommend AAA US aortic screening at age 65-75 based on the society of vascular surgery recommendations.  It was made aware of this to follow-up with his PCP when he turns 63.    It was a pleasure meeting with Mr. Xie in our clinic today.    Shira Mitchell MD  Vascular Medicine  November 29, 2023

## 2023-11-29 NOTE — PROGRESS NOTES
[facility]        Patient is here for a consult to discuss varicose vein(s). The patient has varicose veins that are problematic in right legs. Symptoms patient has been experiencing are aching, discoloration, and  swelling. Swelling usually begins in the afternoon. Patient states their varicose veins are bothersome when  first waking up in the morning for 15-20 minutes . Patient has not been using pain medication or anti-inflammatory's. Patient has not had recent imaging on legs done. Patient has not work compression stockings.  Educated patient to work on conservative treatments.      Pt is currently taking Aspirin and Statin.    The provider has been notified that the patient has concerns of RLE pain and swelling..     Questions patient would like addressed today are: N/A.    Refills are needed: N/A    Has homecare services and agency name:  No

## 2023-12-23 DIAGNOSIS — N52.9 ERECTILE DYSFUNCTION, UNSPECIFIED ERECTILE DYSFUNCTION TYPE: ICD-10-CM

## 2023-12-26 RX ORDER — TADALAFIL 5 MG/1
TABLET ORAL
Qty: 12 TABLET | Refills: 0 | Status: SHIPPED | OUTPATIENT
Start: 2023-12-26 | End: 2024-06-03

## 2024-03-02 ENCOUNTER — HEALTH MAINTENANCE LETTER (OUTPATIENT)
Age: 49
End: 2024-03-02

## 2024-05-29 SDOH — HEALTH STABILITY: PHYSICAL HEALTH: ON AVERAGE, HOW MANY MINUTES DO YOU ENGAGE IN EXERCISE AT THIS LEVEL?: 30 MIN

## 2024-05-29 SDOH — HEALTH STABILITY: PHYSICAL HEALTH: ON AVERAGE, HOW MANY DAYS PER WEEK DO YOU ENGAGE IN MODERATE TO STRENUOUS EXERCISE (LIKE A BRISK WALK)?: 3 DAYS

## 2024-05-29 ASSESSMENT — SOCIAL DETERMINANTS OF HEALTH (SDOH): HOW OFTEN DO YOU GET TOGETHER WITH FRIENDS OR RELATIVES?: TWICE A WEEK

## 2024-06-03 ENCOUNTER — OFFICE VISIT (OUTPATIENT)
Dept: FAMILY MEDICINE | Facility: CLINIC | Age: 49
End: 2024-06-03
Payer: COMMERCIAL

## 2024-06-03 VITALS
OXYGEN SATURATION: 98 % | HEART RATE: 68 BPM | TEMPERATURE: 97.7 F | DIASTOLIC BLOOD PRESSURE: 79 MMHG | RESPIRATION RATE: 15 BRPM | BODY MASS INDEX: 28.76 KG/M2 | HEIGHT: 72 IN | WEIGHT: 212.3 LBS | SYSTOLIC BLOOD PRESSURE: 112 MMHG

## 2024-06-03 DIAGNOSIS — Z13.1 SCREENING FOR DIABETES MELLITUS: ICD-10-CM

## 2024-06-03 DIAGNOSIS — Z86.73 HISTORY OF CVA (CEREBROVASCULAR ACCIDENT): ICD-10-CM

## 2024-06-03 DIAGNOSIS — Z02.89 ENCOUNTER FOR COMPLETION OF FORM WITH PATIENT: ICD-10-CM

## 2024-06-03 DIAGNOSIS — E78.1 ABNORMALLY LOW HIGH DENSITY LIPOPROTEIN (HDL) CHOLESTEROL WITH HYPERTRIGLYCERIDEMIA: ICD-10-CM

## 2024-06-03 DIAGNOSIS — N52.9 ERECTILE DYSFUNCTION, UNSPECIFIED ERECTILE DYSFUNCTION TYPE: ICD-10-CM

## 2024-06-03 DIAGNOSIS — K21.9 GASTROESOPHAGEAL REFLUX DISEASE WITHOUT ESOPHAGITIS: ICD-10-CM

## 2024-06-03 DIAGNOSIS — E78.6 ABNORMALLY LOW HIGH DENSITY LIPOPROTEIN (HDL) CHOLESTEROL WITH HYPERTRIGLYCERIDEMIA: ICD-10-CM

## 2024-06-03 DIAGNOSIS — Z00.00 ROUTINE GENERAL MEDICAL EXAMINATION AT A HEALTH CARE FACILITY: Primary | ICD-10-CM

## 2024-06-03 LAB — HBA1C MFR BLD: 5.7 % (ref 0–5.6)

## 2024-06-03 PROCEDURE — 36415 COLL VENOUS BLD VENIPUNCTURE: CPT

## 2024-06-03 PROCEDURE — 80061 LIPID PANEL: CPT

## 2024-06-03 PROCEDURE — 99396 PREV VISIT EST AGE 40-64: CPT

## 2024-06-03 PROCEDURE — 80053 COMPREHEN METABOLIC PANEL: CPT

## 2024-06-03 PROCEDURE — 99214 OFFICE O/P EST MOD 30 MIN: CPT | Mod: 25

## 2024-06-03 PROCEDURE — 83036 HEMOGLOBIN GLYCOSYLATED A1C: CPT

## 2024-06-03 RX ORDER — ATORVASTATIN CALCIUM 20 MG/1
20 TABLET, FILM COATED ORAL DAILY
Qty: 90 TABLET | Refills: 3 | Status: SHIPPED | OUTPATIENT
Start: 2024-06-03

## 2024-06-03 RX ORDER — TADALAFIL 5 MG/1
5 TABLET ORAL DAILY PRN
Qty: 12 TABLET | Refills: 1 | Status: SHIPPED | OUTPATIENT
Start: 2024-06-03 | End: 2024-08-05

## 2024-06-03 ASSESSMENT — PAIN SCALES - GENERAL: PAINLEVEL: NO PAIN (0)

## 2024-06-03 NOTE — PROGRESS NOTES
Preventive Care Visit  [facility]  MARY KATE Watson CNP, Nurse Practitioner Primary Care  Erik 3, 2024      Assessment & Plan     Routine general medical examination at a health care facility  Preventative exam completed today. Discussed healthy lifestyle recommendations. Reviewed and updated health maintenance. Labs updated today. He is due for zoster vaccine. He will return to have this completed.   - REVIEW OF HEALTH MAINTENANCE PROTOCOL ORDERS  - Comprehensive metabolic panel (BMP + Alb, Alk Phos, ALT, AST, Total. Bili, TP)    History of CVA (cerebrovascular accident)  Prior cerebellar CVA s/p PFO closure November 2022. He stopped daily asa due to adverse effects of bruising easily. Continues on atorvastatin.   - atorvastatin (LIPITOR) 20 MG tablet  Dispense: 90 tablet; Refill: 3    Erectile dysfunction, unspecified erectile dysfunction type  Stable. Continue on current medication.   - tadalafil (CIALIS) 5 MG tablet  Dispense: 12 tablet; Refill: 1    Abnormally low high density lipoprotein (HDL) cholesterol with hypertriglyceridemia  Stable. Continues on atorvastatin.   - Lipid panel reflex to direct LDL Fasting    Gastroesophageal reflux disease without esophagitis  Worsening acid reflux over the past 2 years. Starts about an hour after eating. Will check for H.Pylor, trial of PPI. If no improvement consider EGD.   - Helicobacter pylori Antigen Stool  - omeprazole (PRILOSEC) 20 MG DR capsule  Dispense: 90 capsule; Refill: 0    Screening for diabetes mellitus  Previous A1c in prediabetes range. Update lab today.  - Hemoglobin A1c     Encounter for completion of form with patient  Form completed for his 's trip. No restrictions to participate.     Patient has been advised of split billing requirements and indicates understanding: Yes    Plan to follow up for routine physical in one year.     BMI  Estimated body mass index is 28.79 kg/m  as calculated from the following:    Height as of  this encounter: 1.829 m (6').    Weight as of this encounter: 96.3 kg (212 lb 4.8 oz).   Weight management plan: Discussed healthy diet and exercise guidelines    Counseling  Appropriate preventive services were discussed with this patient, including applicable screening as appropriate for fall prevention, nutrition, physical activity, Tobacco-use cessation, weight loss and cognition.  Checklist reviewing preventive services available has been given to the patient.  Reviewed patient's diet, addressing concerns and/or questions.   He is at risk for lack of exercise and has been provided with information to increase physical activity for the benefit of his well-being.   The patient was instructed to see the dentist every 6 months.   He is at risk for psychosocial distress and has been provided with information to reduce risk.         Modesto Mar is a 48 year old, presenting for the following:  Recheck Medication and Physical        6/3/2024     7:57 AM   Additional Questions   Roomed by shagufta sears         6/3/2024   Forms   Any forms needing to be completed Yes        Health Care Directive  Patient has a Health Care Directive on file  Advance care planning document is on file and is current.    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  Supervises  camping trip over the summer and has a trip planned to [facility] with his wife.    History of CVA July 2022, found PFO s/p device closure November 2022. Stopped taking his aspirin as he was having adverse effects of bruising.     Father history diabetes, alzheimer's. Past away this last year.     Mother with PFO found in her 50's.     Last two years worsening acid reflux.  Some dietary changes.  Cut out red meat, rare alcohol and coffee use.  Even when he does not eat spicy food will get acid reflux.  Has been taking Pepcid which has been helpful.  Usually hurts about an hour after eating and sometimes will make him cough.          5/29/2024    General Health   How would you rate your overall physical health? Good   Feel stress (tense, anxious, or unable to sleep) Only a little   (!) STRESS CONCERN      5/29/2024   Nutrition   Three or more servings of calcium each day? Yes   Diet: Vegetarian/vegan   How many servings of fruit and vegetables per day? (!) 2-3   How many sweetened beverages each day? 0-1         5/29/2024   Exercise   Days per week of moderate/strenous exercise 3 days   Average minutes spent exercising at this level 30 min         5/29/2024   Social Factors   Frequency of gathering with friends or relatives Twice a week   Worry food won't last until get money to buy more No   Food not last or not have enough money for food? No   Do you have housing?  Yes   Are you worried about losing your housing? No   Lack of transportation? No   Unable to get utilities (heat,electricity)? No         5/29/2024   Fall Risk   Fallen 2 or more times in the past year? No   Trouble with walking or balance? No          5/29/2024   Dental   Dentist two times every year? (!) NO         5/29/2024   TB Screening   Were you born outside of the [place]? No       Today's PHQ-2 Score:       6/3/2024     7:55 AM   PHQ-2 ( 1999 Pf[employer])   Q1: Little interest or pleasure in doing things 0   Q2: Feeling down, depressed or hopeless 0   PHQ-2 Score 0   Q1: Little interest or pleasure in doing things Not at all   Q2: Feeling down, depressed or hopeless Not at all   PHQ-2 Score 0           5/29/2024   Substance Use   Alcohol more than 3/day or more than 7/wk No   Do you use any other substances recreationally? (!) ALCOHOL     Social History     Tobacco Use    Smoking status: Never     Passive exposure: Never    Smokeless tobacco: Never   Vaping Use    Vaping status: Never Used   Substance Use Topics    Alcohol use: Yes     Comment: 5 dirnks a week     Drug use: Never           5/29/2024   STI Screening   New sexual partner(s) since last STI/HIV test? No   ASCVD Risk   The  ASCVD Risk score (Pascale REYNA, et al., 2019) failed to calculate for the following reasons:    The patient has a prior MI or stroke diagnosis        Reviewed and updated as needed this visit by Provider                  Lab work is in process  Labs reviewed in EPIC  BP Readings from Last 3 Encounters:   06/03/24 112/79   11/29/23 116/76   06/05/23 110/75    Wt Readings from Last 3 Encounters:   06/03/24 96.3 kg (212 lb 4.8 oz)   11/29/23 95.1 kg (209 lb 9.6 oz)   06/05/23 93 kg (205 lb)                Review of Systems  CONSTITUTIONAL: NEGATIVE for fever, chills, change in weight  INTEGUMENTARY/SKIN: NEGATIVE for worrisome rashes, moles or lesions  EYES: NEGATIVE for vision changes or irritation  ENT/MOUTH: NEGATIVE for ear, mouth and throat problems  RESP: NEGATIVE for significant cough or SOB  CV: NEGATIVE for chest pain, palpitations or peripheral edema  GI: heartburn or reflux  : NEGATIVE for frequency, dysuria, or hematuria  MUSCULOSKELETAL: NEGATIVE for significant arthralgias or myalgia  NEURO: NEGATIVE for weakness, dizziness or paresthesias  ENDOCRINE: NEGATIVE for temperature intolerance, skin/hair changes  HEME: NEGATIVE for bleeding problems  PSYCHIATRIC: NEGATIVE for changes in mood or affect     Objective    Exam  /79 (BP Location: Left arm, Patient Position: Sitting, Cuff Size: Adult Large)   Pulse 68   Temp 97.7  F (36.5  C)   Resp 15   Ht 1.829 m (6')   Wt 96.3 kg (212 lb 4.8 oz)   SpO2 98%   BMI 28.79 kg/m     Estimated body mass index is 28.79 kg/m  as calculated from the following:    Height as of this encounter: 1.829 m (6').    Weight as of this encounter: 96.3 kg (212 lb 4.8 oz).    Physical Exam  GENERAL: alert and no distress  EYES: Eyes grossly normal to inspection, PERRL and conjunctivae and sclerae normal  HENT: ear canals and TM's normal, nose and mouth without ulcers or lesions  NECK: no adenopathy, no asymmetry, masses, or scars  RESP: lungs clear to auscultation - no  rales, rhonchi or wheezes  CV: regular rate and rhythm, normal S1 S2, no S3 or S4, no murmur, click or rub, no peripheral edema  ABDOMEN: soft, nontender, no hepatosplenomegaly, no masses and bowel sounds normal  MS: no gross musculoskeletal defects noted, no edema  SKIN: no suspicious lesions or rashes  NEURO: Normal strength and tone, mentation intact and speech normal  PSYCH: mentation appears normal, affect normal/bright      Signed Electronically by: MARY KATE Watson CNP

## 2024-06-03 NOTE — PATIENT INSTRUCTIONS
"Preventive Care Advice   This is general advice we often give to help people stay healthy. Your care team may have specific advice just for you. Please talk to your care team about your own preventive care needs.  Lifestyle  Exercise at least 150 minutes each week (30 minutes a day, 5 days a week).  Do muscle strengthening activities 2 days a week. These help control your weight and prevent disease.  No smoking.  Wear sunscreen to prevent skin cancer.  Have your home tested for radon every 2 to 5 years. Radon is a colorless, odorless gas that can harm your lungs. To learn more, go to www.health.[facility].mn. and search for \"Radon in Homes.\"  Keep guns unloaded and locked up in a safe place like a safe or gun vault, or, use a gun lock and hide the keys. Always lock away bullets separately. To learn more, visit [employer].mn.gov and search for \"safe gun storage.\"  Nutrition  Eat 5 or more servings of fruits and vegetables each day.  Try wheat bread, brown rice and whole grain pasta (instead of white bread, rice, and pasta).  Get enough calcium and vitamin D. Check the label on foods and aim for 100% of the RDA (recommended daily allowance).  Regular exams  Have a dental exam and cleaning every 6 months.  See your health care team every year to talk about:  Any changes in your health.  Any medicines your care team has prescribed.  Preventive care, family planning, and ways to prevent chronic diseases.  Shots (vaccines)   HPV shots (up to age 26), if you've never had them before.  Hepatitis B shots (up to age 59), if you've never had them before.  COVID-19 shot: Get this shot when it's due.  Flu shot: Get a flu shot every year.  Tetanus shot: Get a tetanus shot every 10 years.  Pneumococcal, hepatitis A, and RSV shots: Ask your care team if you need these based on your risk.  Shingles shot (for age 50 and up).  General health tests  Diabetes screening:  Starting at age 35, Get screened for diabetes at least every 3 years.  If " you are younger than age 35, ask your care team if you should be screened for diabetes.  Cholesterol test: At age 39, start having a cholesterol test every 5 years, or more often if advised.  Bone density scan (DEXA): At age 50, ask your care team if you should have this scan for osteoporosis (brittle bones).  Hepatitis C: Get tested at least once in your life.  Abdominal aortic aneurysm screening: Talk to your doctor about having this screening if you:  Have ever smoked; and  Are biologically male; and  Are between the ages of 65 and 75.  STIs (sexually transmitted infections)  Before age 24: Ask your care team if you should be screened for STIs.  After age 24: Get screened for STIs if you're at risk. You are at risk for STIs (including HIV) if:  You are sexually active with more than one person.  You don't use condoms every time.  You or a partner was diagnosed with a sexually transmitted infection.  If you are at risk for HIV, ask about PrEP medicine to prevent HIV.  Get tested for HIV at least once in your life, whether you are at risk for HIV or not.  Cancer screening tests  Cervical cancer screening: If you have a cervix, begin getting regular cervical cancer screening tests at age 21. Most people who have regular screenings with normal results can stop after age 65. Talk about this with your provider.  Breast cancer scan (mammogram): If you've ever had breasts, begin having regular mammograms starting at age 40. This is a scan to check for breast cancer.  Colon cancer screening: It is important to start screening for colon cancer at age 45.  Have a colonoscopy test every 10 years (or more often if you're at risk) Or, ask your provider about stool tests like a FIT test every year or Cologuard test every 3 years.  To learn more about your testing options, visit: www.Runivermag/894364.pdf.  For help making a decision, visit: delicia/rd75620.  Prostate cancer screening test: If you have a prostate and are age 55  to 69, ask your provider if you would benefit from a yearly prostate cancer screening test.  Lung cancer screening: If you are a current or former smoker age 50 to 80, ask your care team if ongoing lung cancer screenings are right for you.  For informational purposes only. Not to replace the advice of your health care provider. Copyright   2023 [employer]. All rights reserved. Clinically reviewed by the [facility] Transitions Program. Breezy [id number] - REV 04/24.    Learning About Stress  What is stress?     Stress is your body's response to a hard situation. Your body can have a physical, emotional, or mental response. Stress is a fact of life for most people, and it affects everyone differently. What causes stress for you may not be stressful for someone else.  A lot of things can cause stress. You may feel stress when you go on a job interview, take a test, or run a race. This kind of short-term stress is normal and even useful. It can help you if you need to work hard or react quickly. For example, stress can help you finish an important job on time.  Long-term stress is caused by ongoing stressful situations or events. Examples of long-term stress include long-term health problems, ongoing problems at work, or conflicts in your family. Long-term stress can harm your health.  How does stress affect your health?  When you are stressed, your body responds as though you are in danger. It makes hormones that speed up your heart, make you breathe faster, and give you a burst of energy. This is called the fight-or-flight stress response. If the stress is over quickly, your body goes back to normal and no harm is done.  But if stress happens too often or lasts too long, it can have bad effects. Long-term stress can make you more likely to get sick, and it can make symptoms of some diseases worse. If you tense up when you are stressed, you may develop neck, shoulder, or low back pain. Stress is  linked to high blood pressure and heart disease.  Stress also harms your emotional health. It can make you veronica, tense, or depressed. Your relationships may suffer, and you may not do well at work or school.  What can you do to manage stress?  You can try these things to help manage stress:   Do something active. Exercise or activity can help reduce stress. Walking is a great way to get started. Even everyday activities such as housecleaning or yard work can help.  Try yoga or katja chi. These techniques combine exercise and meditation. You may need some training at first to learn them.  Do something you enjoy. For example, listen to music or go to a movie. Practice your hobby or do volunteer work.  Meditate. This can help you relax, because you are not worrying about what happened before or what may happen in the future.  Do guided imagery. Imagine yourself in any setting that helps you feel calm. You can use online videos, books, or a teacher to guide you.  Do breathing exercises. For example:  From a standing position, bend forward from the waist with your knees slightly bent. Let your arms dangle close to the floor.  Breathe in slowly and deeply as you return to a standing position. Roll up slowly and lift your head last.  Hold your breath for just a few seconds in the standing position.  Breathe out slowly and bend forward from the waist.  Let your feelings out. Talk, laugh, cry, and express anger when you need to. Talking with supportive friends or family, a counselor, or a loyda leader about your feelings is a healthy way to relieve stress. Avoid discussing your feelings with people who make you feel worse.  Write. It may help to write about things that are bothering you. This helps you find out how much stress you feel and what is causing it. When you know this, you can find better ways to cope.  What can you do to prevent stress?  You might try some of these things to help prevent stress:  Manage your time.  "This helps you find time to do the things you want and need to do.  Get enough sleep. Your body recovers from the stresses of the day while you are sleeping.  Get support. Your family, friends, and community can make a difference in how you experience stress.  Limit your news feed. Avoid or limit time on social media or news that may make you feel stressed.  Do something active. Exercise or activity can help reduce stress. Walking is a great way to get started.  Where can you learn more?  Go to https://www.[employer].net/patiented  Enter N032 in the search box to learn more about \"Learning About Stress.\"  Current as of: October 24, 2023               Content Version: 14.0    4097-7788 The[employer]is.   Care instructions adapted under license by your healthcare professional. If you have questions about a medical condition or this instruction, always ask your healthcare professional. [employer] disclaims any warranty or liability for your use of this information.      Substance Use Disorder: Care Instructions  Overview     You can improve your life and health by stopping your use of alcohol or drugs. When you don't drink or use drugs, you may feel and sleep better. You may get along better with your family, friends, and coworkers. There are medicines and programs that can help with substance use disorder.  How can you care for yourself at home?  Here are some ways to help you stay sober and prevent relapse.  If you have been given medicine to help keep you sober or reduce your cravings, be sure to take it exactly as prescribed.  Talk to your doctor about programs that can help you stop using drugs or drinking alcohol.  Do not keep alcohol or drugs in your home.  Plan ahead. Think about what you'll say if other people ask you to drink or use drugs. Try not to spend time with people who drink or use drugs.  Use the time and money spent on drinking or drugs to do something that's important to " you.  Preventing a relapse  Have a plan to deal with relapse. Learn to recognize changes in your thinking that lead you to drink or use drugs. Get help before you start to drink or use drugs again.  Try to stay away from situations, friends, or places that may lead you to drink or use drugs.  If you feel the need to drink alcohol or use drugs again, seek help right away. Call a trusted friend or family member. Some people get support from organizations such as Narcotics Anonymous or [employer] or from treatment facilities.  If you relapse, get help as soon as you can. Some people make a plan with another person that outlines what they want that person to do for them if they relapse. The plan usually includes how to handle the relapse and who to notify in case of relapse.  Don't give up. Remember that a relapse doesn't mean that you have failed. Use the experience to learn the triggers that lead you to drink or use drugs. Then quit again. Recovery is a lifelong process. Many people have several relapses before they are able to quit for good.  Follow-up care is a key part of your treatment and safety. Be sure to make and go to all appointments, and call your doctor if you are having problems. It's also a good idea to know your test results and keep a list of the medicines you take.  When should you call for help?   Call 911  anytime you think you may need emergency care. For example, call if you or someone else:    Has overdosed or has withdrawal signs. Be sure to tell the emergency workers that you are or someone else is using or trying to quit using drugs. Overdose or withdrawal signs may include:  Losing consciousness.  Seizure.  Seeing or hearing things that aren't there (hallucinations).     Is thinking or talking about suicide or harming others.   Where to get help 24 hours a day, 7 days a week   If you or someone you know talks about suicide, self-harm, a mental health crisis, a substance use crisis, or any  "other kind of emotional distress, get help right away. You can:    Call the Suicide and Crisis Lifeline at 988.     Call 9-368-662-RVJY (1-430.458.7462).     Text HOME to 892946 to access the Crisis Text Line.   Consider saving these numbers in your phone.  Go to [employer] for more information or to chat online.  Call your doctor now or seek immediate medical care if:    You are having withdrawal symptoms. These may include nausea or vomiting, sweating, shakiness, and anxiety.   Watch closely for changes in your health, and be sure to contact your doctor if:    You have a relapse.     You need more help or support to stop.   Where can you learn more?  Go to https://www.linkedÃ¼.net/patiented  Enter H573 in the search box to learn more about \"Substance Use Disorder: Care Instructions.\"  Current as of: November 15, 2023               Content Version: 14.0    1547-0911 [employer].   Care instructions adapted under license by your healthcare professional. If you have questions about a medical condition or this instruction, always ask your healthcare professional. Healthwise, [employer] disclaims any warranty or liability for your use of this information.      "

## 2024-06-04 LAB
ALBUMIN SERPL BCG-MCNC: 4.5 G/DL (ref 3.5–5.2)
ALP SERPL-CCNC: 87 U/L (ref 40–150)
ALT SERPL W P-5'-P-CCNC: 71 U/L (ref 0–70)
ANION GAP SERPL CALCULATED.3IONS-SCNC: 11 MMOL/L (ref 7–15)
AST SERPL W P-5'-P-CCNC: 49 U/L (ref 0–45)
BILIRUB SERPL-MCNC: 0.7 MG/DL
BUN SERPL-MCNC: 11.4 MG/DL (ref 6–20)
CALCIUM SERPL-MCNC: 9.3 MG/DL (ref 8.6–10)
CHLORIDE SERPL-SCNC: 108 MMOL/L (ref 98–107)
CHOLEST SERPL-MCNC: 101 MG/DL
CREAT SERPL-MCNC: 0.93 MG/DL (ref 0.67–1.17)
DEPRECATED HCO3 PLAS-SCNC: 22 MMOL/L (ref 22–29)
EGFRCR SERPLBLD CKD-EPI 2021: >90 ML/MIN/1.73M2
FASTING STATUS PATIENT QL REPORTED: ABNORMAL
FASTING STATUS PATIENT QL REPORTED: ABNORMAL
GLUCOSE SERPL-MCNC: 106 MG/DL (ref 70–99)
HDLC SERPL-MCNC: 37 MG/DL
LDLC SERPL CALC-MCNC: 42 MG/DL
NONHDLC SERPL-MCNC: 64 MG/DL
POTASSIUM SERPL-SCNC: 4.4 MMOL/L (ref 3.4–5.3)
PROT SERPL-MCNC: 7 G/DL (ref 6.4–8.3)
SODIUM SERPL-SCNC: 141 MMOL/L (ref 135–145)
TRIGL SERPL-MCNC: 110 MG/DL

## 2024-06-05 DIAGNOSIS — R74.8 ELEVATED LIVER ENZYMES: Primary | ICD-10-CM

## 2024-06-05 PROCEDURE — 87338 HPYLORI STOOL AG IA: CPT

## 2024-06-06 ENCOUNTER — APPOINTMENT (OUTPATIENT)
Dept: LAB | Facility: CLINIC | Age: 49
End: 2024-06-06
Payer: COMMERCIAL

## 2024-06-07 LAB — H PYLORI AG STL QL IA: NEGATIVE

## 2024-08-05 DIAGNOSIS — N52.9 ERECTILE DYSFUNCTION, UNSPECIFIED ERECTILE DYSFUNCTION TYPE: ICD-10-CM

## 2024-08-05 RX ORDER — TADALAFIL 5 MG/1
5 TABLET ORAL DAILY PRN
Qty: 12 TABLET | Refills: 3 | Status: SHIPPED | OUTPATIENT
Start: 2024-08-05

## 2025-01-27 DIAGNOSIS — N52.9 ERECTILE DYSFUNCTION, UNSPECIFIED ERECTILE DYSFUNCTION TYPE: ICD-10-CM

## 2025-01-27 RX ORDER — TADALAFIL 5 MG/1
5 TABLET ORAL DAILY PRN
Qty: 12 TABLET | Refills: 3 | Status: SHIPPED | OUTPATIENT
Start: 2025-01-27

## 2025-02-07 NOTE — PROGRESS NOTES
HEART CARE OUT-PATIENT CONSULTATON NOTE      [facility]  [phone]      Assessment/Recommendations   Assessment: 49 year old male with palpitations     Plan:  Palpitations  Suspect PACs or PVCs by description, but with his history of CVA also worry about atrial fibrillation        -3 day Zio     Bradycardia  No symptoms, likely sinus      -ECG    Family history of AAA  He has not been screened      -Abdominal aorta US    History of PFO closure  Done at [facility] 2022 for cryptogenic stroke      -Rest echocardiogram as below    Thoracic aortic aneurysm   40mm by echocardiogram 2022      -Recheck rest echocardiogram    Hyperlipidemia   LDL = 42      -Continue Lipitor 20mg     The longitudinal plan of care for the diagnosis(es)/condition(s) as documented were addressed during this visit. Due to the added complexity in care, I will continue to support Zaid in the subsequent management and with ongoing continuity of care.        History of Present Illness/Subjective    Indication for Consult:  I was asked to see Zaid Xie by Dr Hutson for palpitations       HPI: Zaid Xie is a 49 year old male with a history of hyperlipidemia, CVA s/p PFO closure who presents for evaluation of palpitations.    He reports about a month of irregular heart beats.  Had similar symptoms a few years ago after PFO closure, resolved after a few weeks and resolved.  This time feels a little different.  Doesn't last as long, hasn't caught on Kardia.  Happens multiple times every day, causing brief dyspnea.  Works out several times per week, exercise doesn't trigger symptoms and feels normal between palpitation episodes without chest pain, dyspnea, orthopnea, PND.    I reviewed notes from PCP, [facility] (Dr Wagner) prior to this visit.         Physical Examination  Past Cardiac History   Vitals: /80 (BP Location: Right arm, Patient Position: Sitting, Cuff Size: Adult Large)   Pulse 50   Resp 14   Wt 96.6  kg (213 lb)   BMI 28.89 kg/m    BMI= Body mass index is 28.89 kg/m .  Wt Readings from Last 3 Encounters:   02/12/25 96.6 kg (213 lb)   06/03/24 96.3 kg (212 lb 4.8 oz)   11/29/23 95.1 kg (209 lb 9.6 oz)       General Appearance:   no distress, normal body habitus   ENT/Mouth: membranes moist, no oral lesions or bleeding gums.      EYES:  no scleral icterus, normal conjunctivae   Neck: no carotid bruits or thyromegaly   Chest/Lungs:   lungs are clear to auscultation, no rales or wheezing,  sternal scar, equal chest wall expansion    Cardiovascular:   Regular. Normal first and second heart sounds with no murmurs, rubs, or gallops; the carotid, radial and posterior tibial pulses are intact, Jugular venous pressure normal , no edema bilaterally    Abdomen:  no organomegaly, masses, bruits, or tenderness; bowel sounds are present   Extremities: no cyanosis or clubbing   Skin: no xanthelasma, warm.    Neurologic: normal  bilateral, no tremors           PFO: s/p closure with 30mm [place] Cardioform 11/14/2022 for cryptogenic stroke   Aorta 40mm  Conduction disease: 1st degree AVB, incomplete RBBB    Most Recent Echocardiogram: 7/10/2023 ([place])  1. Status post patent foramen ovale  closure (14-NOV-2022). PFO closure using a 30 mm [place] CARDIOFORM PFO occluder device under ICE guidance.   2. Status post GORE CARDIOFORM Septal Occluder 30 mm.   3. No atrial level shunt by agitated saline contrast injection.     Most Recent Stress Test: None    Most Recent Angiogram: None    ECG (reviewed by myself): 2/12/2025 NSR 47 bpm, PACs, 1st degree AVB ([place] 212ms), incomplete RBBB           Medical History  Family History Social History   Past Medical History:   Diagnosis Date    BMI 27.0-27.9,adult 8/11/2021    Cerebrovascular accident (CVA) involving cerebellum (H) 7/19/2022    Cervical radiculitis 2/15/2021    Esophageal reflux 04/13/2021    History of decreased platelet count 8/11/2021    Leukopenia, unspecified type 8/11/2021     Lipoma of skin and subcutaneous tissue 3/23/2021    Added automatically from request for surgery [id number]    Migraine 2016    Formatting of this note might be different from the original. Related to dehydration.    Patent foramen ovale 2022    Vitiligo      Family History   Problem Relation Age of Onset    Hypertension Mother     Cerebrovascular Disease Mother         patent foramen ovale    Seizure Disorder Mother     Diabetes Father     Diabetes Paternal Grandmother                Mother had PFO  Father had AAA  Social History     Socioeconomic History    Marital status:      Spouse name: Not on file    Number of children: Not on file    Years of education: Not on file    Highest education level: Not on file   Occupational History    Not on file   Tobacco Use    Smoking status: Never     Passive exposure: Never    Smokeless tobacco: Never   Vaping Use    Vaping status: Never Used   Substance and Sexual Activity    Alcohol use: Yes     Comment: 5 dirnks a week     Drug use: Never    Sexual activity: Yes     Partners: Female   Other Topics Concern    Not on file   Social History Narrative    2021: father of three boys,oldest an undergrad at [employer] in [place], 2nd son going off to [employer] in [place]. 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, 3rd at college, and a dog     Social Drivers of Health     Financial Resource Strain: Low Risk  (2024)    Financial Resource Strain     Within the past 12 months, have you or your family members you live with been unable to get utilities (heat, electricity) when it was really needed?: No   Food Insecurity: Low Risk  (2024)    Food Insecurity     Within the past 12 months, did you worry that your food would run out before you got money to buy more?: No     Within the past 12 months, did the food you bought just not last and you didn t have  money to get more?: No   Transportation Needs: Low Risk  (5/29/2024)    Transportation Needs     Within the past 12 months, has lack of transportation kept you from medical appointments, getting your medicines, non-medical meetings or appointments, work, or from getting things that you need?: No   Physical Activity: Insufficiently Active (5/29/2024)    Exercise Vital Sign     Days of Exercise per Week: 3 days     Minutes of Exercise per Session: 30 min   Stress: No Stress Concern Present (5/29/2024)    [place] [employer] - Occupational Stress Questionnaire     Feeling of Stress : Only a little   Social Connections: Unknown (5/29/2024)    Social Connection and Isolation Panel [NHANES]     Frequency of Communication with Friends and Family: Not on file     Frequency of Social Gatherings with Friends and Family: Twice a week     Attends [employer] Services: Not on file     Active Member of Clubs or Organizations: Not on file     Attends Club or Organization Meetings: Not on file     Marital Status: Not on file   Interpersonal Safety: Low Risk  (6/3/2024)    Interpersonal Safety     Do you feel physically and emotionally safe where you currently live?: Yes     Within the past 12 months, have you been hit, slapped, kicked or otherwise physically hurt by someone?: No     Within the past 12 months, have you been humiliated or emotionally abused in other ways by your partner or ex-partner?: No   Housing Stability: Low Risk  (5/29/2024)    Housing Stability     Do you have housing? : Yes     Are you worried about losing your housing?: No           Medications  Allergies   Current Outpatient Medications   Medication Sig Dispense Refill    atorvastatin (LIPITOR) 20 MG tablet Take 1 tablet (20 mg) by mouth daily 90 tablet 3    cholecalciferol (VITAMIN D3) 125 mcg (5000 units) capsule       co-enzyme Q-10 100 MG CAPS capsule       Cyanocobalamin (VITAMIN B 12 PO) daily b complex      LUTEIN PO       OMEGA-3  "FATTY ACIDS PO       tadalafil (CIALIS) 5 MG tablet TAKE 1 TABLET (5 MG) BY MOUTH DAILY AS NEEDED (ED) 12 tablet 3    vitamin C (ASCORBIC ACID) 500 MG tablet Take 500 mg by mouth      omeprazole (PRILOSEC) 20 MG DR capsule Take 1 capsule (20 mg) by mouth daily (Patient not taking: Reported on 2/12/2025) 90 capsule 0       Allergies   Allergen Reactions    Triptans Other (See Comments)     History of cerebellar CVA          Lab Results    Chemistry/lipid CBC Cardiac Enzymes/BNP/TSH/INR   Recent Labs   Lab Test 06/03/24  0845   CHOL 101   HDL 37*   LDL 42   TRIG 110     Recent Labs   Lab Test 06/03/24  0845 06/05/23  0919 11/28/22  1000   LDL 42 83 95     Recent Labs   Lab Test 06/03/24  0845      POTASSIUM 4.4   CHLORIDE 108*   CO2 22   *   BUN 11.4   CR 0.93   GFRESTIMATED >90   AGUSTIN 9.3     Recent Labs   Lab Test 06/03/24  0845 06/05/23  0919 11/28/22  1000   CR 0.93 0.93 0.83     Recent Labs   Lab Test 06/03/24  0845 06/05/23  0919 11/28/22  1000   A1C 5.7* 5.5 5.8*          Recent Labs   Lab Test 11/28/22  1000   WBC 4.7   HGB 13.9   HCT 40.7   MCV 90        Recent Labs   Lab Test 11/28/22  1000 03/07/22  1213 08/11/21  0855   HGB 13.9 14.6 15.1    No results for input(s): \"TROPONINI\" in the last 79375 hours.  No results for input(s): \"BNP\", \"NTBNPI\", \"NTBNP\" in the last 68827 hours.  Recent Labs   Lab Test 11/28/22  1000   TSH 2.02     No results for input(s): \"INR\" in the last 13328 hours.     Estephania Blount MD  Noninvasive Cardiologist   [facility]                                    "

## 2025-02-12 ENCOUNTER — ORDERS ONLY (AUTO-RELEASED) (OUTPATIENT)
Dept: CARDIOLOGY | Facility: CLINIC | Age: 50
End: 2025-02-12

## 2025-02-12 ENCOUNTER — OFFICE VISIT (OUTPATIENT)
Dept: CARDIOLOGY | Facility: CLINIC | Age: 50
End: 2025-02-12
Payer: COMMERCIAL

## 2025-02-12 VITALS
RESPIRATION RATE: 14 BRPM | DIASTOLIC BLOOD PRESSURE: 80 MMHG | BODY MASS INDEX: 28.89 KG/M2 | WEIGHT: 213 LBS | SYSTOLIC BLOOD PRESSURE: 122 MMHG | HEART RATE: 50 BPM

## 2025-02-12 DIAGNOSIS — I71.21 ANEURYSM OF ASCENDING AORTA WITHOUT RUPTURE: ICD-10-CM

## 2025-02-12 DIAGNOSIS — R00.1 BRADYCARDIA: ICD-10-CM

## 2025-02-12 DIAGNOSIS — Z82.49 FAMILY HISTORY OF ABDOMINAL AORTIC ANEURYSM (AAA): ICD-10-CM

## 2025-02-12 DIAGNOSIS — R00.2 PALPITATIONS: ICD-10-CM

## 2025-02-12 DIAGNOSIS — R00.2 PALPITATIONS: Primary | ICD-10-CM

## 2025-02-12 DIAGNOSIS — E78.5 HYPERLIPIDEMIA LDL GOAL <100: ICD-10-CM

## 2025-02-12 LAB
ATRIAL RATE - MUSE: 47 BPM
DIASTOLIC BLOOD PRESSURE - MUSE: NORMAL MMHG
INTERPRETATION ECG - MUSE: NORMAL
P AXIS - MUSE: 27 DEGREES
PR INTERVAL - MUSE: 212 MS
QRS DURATION - MUSE: 94 MS
QT - MUSE: 438 MS
QTC - MUSE: 387 MS
R AXIS - MUSE: -39 DEGREES
SYSTOLIC BLOOD PRESSURE - MUSE: NORMAL MMHG
T AXIS - MUSE: -6 DEGREES
VENTRICULAR RATE- MUSE: 47 BPM

## 2025-02-12 PROCEDURE — 99204 OFFICE O/P NEW MOD 45 MIN: CPT | Performed by: INTERNAL MEDICINE

## 2025-02-12 PROCEDURE — G2211 COMPLEX E/M VISIT ADD ON: HCPCS | Performed by: INTERNAL MEDICINE

## 2025-02-12 PROCEDURE — 93000 ELECTROCARDIOGRAM COMPLETE: CPT | Performed by: INTERNAL MEDICINE

## 2025-02-12 NOTE — PATIENT INSTRUCTIONS
I suspect your symptoms are from extra beats, either PACs or PVCs.  But we do also have to worry about atrial fibrillation or another arrhythmia.  Will have you wear a heart monitor for 3 days.    Additionally your echocardiogram in 2022 showed slightly enlarged aorta at 40mm.  Will recheck an echocardiogram to make sure that hasn't gotten bigger.  Also with your Dad's history of AAA will check an abdominal ultrasound to make sure you don't  have that as well.

## 2025-02-12 NOTE — LETTER
2/12/2025    Izzy Hutson MD  [address]  [place] [address]    RE: Zaid Xie       Dear Colleague,     I had the pleasure of seeing Zaid Xie in the [facility].    HEART CARE OUT-PATIENT CONSULTATON NOTE      [facility]  [phone]      Assessment/Recommendations   Assessment: 49 year old male with palpitations     Plan:  Palpitations  Suspect PACs or PVCs by description, but with his history of CVA also worry about atrial fibrillation        -3 day Zio     Bradycardia  No symptoms, likely sinus      -ECG    Family history of AAA  He has not been screened      -Abdominal aorta US    History of PFO closure  Done at [facility] 2022 for cryptogenic stroke      -Rest echocardiogram as below    Thoracic aortic aneurysm   40mm by echocardiogram 2022      -Recheck rest echocardiogram    Hyperlipidemia   LDL = 42      -Continue Lipitor 20mg     The longitudinal plan of care for the diagnosis(es)/condition(s) as documented were addressed during this visit. Due to the added complexity in care, I will continue to support Zaid in the subsequent management and with ongoing continuity of care.        History of Present Illness/Subjective    Indication for Consult:  I was asked to see Zaid Xie by Dr Hutson for palpitations       HPI: Zaid Xie is a 49 year old male with a history of hyperlipidemia, CVA s/p PFO closure who presents for evaluation of palpitations.    He reports about a month of irregular heart beats.  Had similar symptoms a few years ago after PFO closure, resolved after a few weeks and resolved.  This time feels a little different.  Doesn't last as long, hasn't caught on Kardia.  Happens multiple times every day, causing brief dyspnea.  Works out several times per week, exercise doesn't trigger symptoms and feels normal between palpitation episodes without chest pain, dyspnea, orthopnea, PND.    I reviewed notes from PCP, [facility] (  Kristy) prior to this visit.         Physical Examination  Past Cardiac History   Vitals: /80 (BP Location: Right arm, Patient Position: Sitting, Cuff Size: Adult Large)   Pulse 50   Resp 14   Wt 96.6 kg (213 lb)   BMI 28.89 kg/m    BMI= Body mass index is 28.89 kg/m .  Wt Readings from Last 3 Encounters:   02/12/25 96.6 kg (213 lb)   06/03/24 96.3 kg (212 lb 4.8 oz)   11/29/23 95.1 kg (209 lb 9.6 oz)       General Appearance:   no distress, normal body habitus   ENT/Mouth: membranes moist, no oral lesions or bleeding gums.      EYES:  no scleral icterus, normal conjunctivae   Neck: no carotid bruits or thyromegaly   Chest/Lungs:   lungs are clear to auscultation, no rales or wheezing,  sternal scar, equal chest wall expansion    Cardiovascular:   Regular. Normal first and second heart sounds with no murmurs, rubs, or gallops; the carotid, radial and posterior tibial pulses are intact, Jugular venous pressure normal , no edema bilaterally    Abdomen:  no organomegaly, masses, bruits, or tenderness; bowel sounds are present   Extremities: no cyanosis or clubbing   Skin: no xanthelasma, warm.    Neurologic: normal  bilateral, no tremors           PFO: s/p closure with 30mm [place] Cardioform 11/14/2022 for cryptogenic stroke   Aorta 40mm  Conduction disease: 1st degree AVB, incomplete RBBB    Most Recent Echocardiogram: 7/10/2023 ([place])  1. Status post patent foramen ovale  closure (14-NOV-2022). PFO closure using a 30 mm [place] CARDIOFORM PFO occluder device under ICE guidance.   2. Status post GORE CARDIOFORM Septal Occluder 30 mm.   3. No atrial level shunt by agitated saline contrast injection.     Most Recent Stress Test: None    Most Recent Angiogram: None    ECG (reviewed by myself): 2/12/2025 NSR 47 bpm, PACs, 1st degree AVB ([place] 212ms), incomplete RBBB           Medical History  Family History Social History   Past Medical History:   Diagnosis Date     BMI 27.0-27.9,adult 8/11/2021     Cerebrovascular  accident (CVA) involving cerebellum (H) 2022     Cervical radiculitis 2/15/2021     Esophageal reflux 2021     History of decreased platelet count 2021     Leukopenia, unspecified type 2021     Lipoma of skin and subcutaneous tissue 3/23/2021    Added automatically from request for surgery [id number]     Migraine 2016    Formatting of this note might be different from the original. Related to dehydration.     Patent foramen ovale 2022     Vitiligo      Family History   Problem Relation Age of Onset     Hypertension Mother      Cerebrovascular Disease Mother         patent foramen ovale     Seizure Disorder Mother      Diabetes Father      Diabetes Paternal Grandmother                Mother had PFO  Father had AAA  Social History     Socioeconomic History     Marital status:      Spouse name: Not on file     Number of children: Not on file     Years of education: Not on file     Highest education level: Not on file   Occupational History     Not on file   Tobacco Use     Smoking status: Never     Passive exposure: Never     Smokeless tobacco: Never   Vaping Use     Vaping status: Never Used   Substance and Sexual Activity     Alcohol use: Yes     Comment: 5 dirnks a week      Drug use: Never     Sexual activity: Yes     Partners: Female   Other Topics Concern     Not on file   Social History Narrative    2021: father of three boys,oldest an undergrad at [employer] in [place], 2nd son going off to [employer] in [place]. 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, 3rd at college, and a dog     Social Drivers of Health     Financial Resource Strain: Low Risk  (2024)    Financial Resource Strain      Within the past 12 months, have you or your family members you live with been unable to get utilities (heat, electricity) when it was really needed?: No   Food Insecurity: Low  Risk  (5/29/2024)    Food Insecurity      Within the past 12 months, did you worry that your food would run out before you got money to buy more?: No      Within the past 12 months, did the food you bought just not last and you didn t have money to get more?: No   Transportation Needs: Low Risk  (5/29/2024)    Transportation Needs      Within the past 12 months, has lack of transportation kept you from medical appointments, getting your medicines, non-medical meetings or appointments, work, or from getting things that you need?: No   Physical Activity: Insufficiently Active (5/29/2024)    Exercise Vital Sign      Days of Exercise per Week: 3 days      Minutes of Exercise per Session: 30 min   Stress: No Stress Concern Present (5/29/2024)    [place] [employer] - Occupational Stress Questionnaire      Feeling of Stress : Only a little   Social Connections: Unknown (5/29/2024)    Social Connection and Isolation Panel [NHANES]      Frequency of Communication with Friends and Family: Not on file      Frequency of Social Gatherings with Friends and Family: Twice a week      Attends [employer] Services: Not on file      Active Member of Clubs or Organizations: Not on file      Attends Club or Organization Meetings: Not on file      Marital Status: Not on file   Interpersonal Safety: Low Risk  (6/3/2024)    Interpersonal Safety      Do you feel physically and emotionally safe where you currently live?: Yes      Within the past 12 months, have you been hit, slapped, kicked or otherwise physically hurt by someone?: No      Within the past 12 months, have you been humiliated or emotionally abused in other ways by your partner or ex-partner?: No   Housing Stability: Low Risk  (5/29/2024)    Housing Stability      Do you have housing? : Yes      Are you worried about losing your housing?: No           Medications  Allergies   Current Outpatient Medications   Medication Sig Dispense Refill     atorvastatin  "(LIPITOR) 20 MG tablet Take 1 tablet (20 mg) by mouth daily 90 tablet 3     cholecalciferol (VITAMIN D3) 125 mcg (5000 units) capsule        co-enzyme Q-10 100 MG CAPS capsule        Cyanocobalamin (VITAMIN B 12 PO) daily b complex       LUTEIN PO        OMEGA-3 FATTY ACIDS PO        tadalafil (CIALIS) 5 MG tablet TAKE 1 TABLET (5 MG) BY MOUTH DAILY AS NEEDED (ED) 12 tablet 3     vitamin C (ASCORBIC ACID) 500 MG tablet Take 500 mg by mouth       omeprazole (PRILOSEC) 20 MG DR capsule Take 1 capsule (20 mg) by mouth daily (Patient not taking: Reported on 2/12/2025) 90 capsule 0       Allergies   Allergen Reactions     Triptans Other (See Comments)     History of cerebellar CVA          Lab Results    Chemistry/lipid CBC Cardiac Enzymes/BNP/TSH/INR   Recent Labs   Lab Test 06/03/24  0845   CHOL 101   HDL 37*   LDL 42   TRIG 110     Recent Labs   Lab Test 06/03/24  0845 06/05/23  0919 11/28/22  1000   LDL 42 83 95     Recent Labs   Lab Test 06/03/24  0845      POTASSIUM 4.4   CHLORIDE 108*   CO2 22   *   BUN 11.4   CR 0.93   GFRESTIMATED >90   AGUSTIN 9.3     Recent Labs   Lab Test 06/03/24  0845 06/05/23  0919 11/28/22  1000   CR 0.93 0.93 0.83     Recent Labs   Lab Test 06/03/24  0845 06/05/23  0919 11/28/22  1000   A1C 5.7* 5.5 5.8*          Recent Labs   Lab Test 11/28/22  1000   WBC 4.7   HGB 13.9   HCT 40.7   MCV 90        Recent Labs   Lab Test 11/28/22  1000 03/07/22  1213 08/11/21  0855   HGB 13.9 14.6 15.1    No results for input(s): \"TROPONINI\" in the last 36296 hours.  No results for input(s): \"BNP\", \"NTBNPI\", \"NTBNP\" in the last 16338 hours.  Recent Labs   Lab Test 11/28/22  1000   TSH 2.02     No results for input(s): \"INR\" in the last 43720 hours.     Estephania lBount MD  Noninvasive Cardiologist   [facility]                                        Thank you for allowing me to participate in the care of your patient.      Sincerely,     Estephania Blount MD     [facility]  cc:   Referred Self, MD  No address on file

## 2025-03-03 ENCOUNTER — HOSPITAL ENCOUNTER (OUTPATIENT)
Dept: CARDIOLOGY | Facility: CLINIC | Age: 50
Discharge: HOME | End: 2025-03-03
Attending: INTERNAL MEDICINE
Payer: COMMERCIAL

## 2025-03-03 ENCOUNTER — HOSPITAL ENCOUNTER (OUTPATIENT)
Dept: ULTRASOUND IMAGING | Facility: CLINIC | Age: 50
Discharge: HOME | End: 2025-03-03
Attending: INTERNAL MEDICINE
Payer: COMMERCIAL

## 2025-03-03 DIAGNOSIS — R00.1 BRADYCARDIA: ICD-10-CM

## 2025-03-03 DIAGNOSIS — I71.21 ANEURYSM OF ASCENDING AORTA WITHOUT RUPTURE: Primary | ICD-10-CM

## 2025-03-03 DIAGNOSIS — R00.2 PALPITATIONS: ICD-10-CM

## 2025-03-03 DIAGNOSIS — I71.21 ANEURYSM OF ASCENDING AORTA WITHOUT RUPTURE: ICD-10-CM

## 2025-03-03 DIAGNOSIS — Z82.49 FAMILY HISTORY OF ABDOMINAL AORTIC ANEURYSM (AAA): ICD-10-CM

## 2025-03-03 LAB — LVEF ECHO: NORMAL

## 2025-03-03 PROCEDURE — 93306 TTE W/DOPPLER COMPLETE: CPT | Mod: 26 | Performed by: INTERNAL MEDICINE

## 2025-03-03 PROCEDURE — 93306 TTE W/DOPPLER COMPLETE: CPT

## 2025-03-03 PROCEDURE — 76775 US EXAM ABDO BACK WALL LIM: CPT

## 2025-04-07 ENCOUNTER — TRANSFERRED RECORDS (OUTPATIENT)
Dept: HEALTH INFORMATION MANAGEMENT | Facility: CLINIC | Age: 50
End: 2025-04-07

## 2025-04-07 ENCOUNTER — OFFICE VISIT (OUTPATIENT)
Dept: FAMILY MEDICINE | Facility: CLINIC | Age: 50
End: 2025-04-07
Payer: COMMERCIAL

## 2025-04-07 ENCOUNTER — TELEPHONE (OUTPATIENT)
Dept: FAMILY MEDICINE | Facility: CLINIC | Age: 50
End: 2025-04-07

## 2025-04-07 VITALS
SYSTOLIC BLOOD PRESSURE: 132 MMHG | HEART RATE: 78 BPM | TEMPERATURE: 97 F | DIASTOLIC BLOOD PRESSURE: 86 MMHG | WEIGHT: 210.6 LBS | HEIGHT: 72 IN | OXYGEN SATURATION: 99 % | RESPIRATION RATE: 18 BRPM | BODY MASS INDEX: 28.53 KG/M2

## 2025-04-07 DIAGNOSIS — Z87.898 HISTORY OF PREDIABETES: ICD-10-CM

## 2025-04-07 DIAGNOSIS — Z12.11 COLON CANCER SCREENING: ICD-10-CM

## 2025-04-07 DIAGNOSIS — E78.1 ABNORMALLY LOW HIGH DENSITY LIPOPROTEIN (HDL) CHOLESTEROL WITH HYPERTRIGLYCERIDEMIA: ICD-10-CM

## 2025-04-07 DIAGNOSIS — Z87.898 HISTORY OF HEARTBURN: ICD-10-CM

## 2025-04-07 DIAGNOSIS — I86.1 VARICOCELE: ICD-10-CM

## 2025-04-07 DIAGNOSIS — E78.6 ABNORMALLY LOW HIGH DENSITY LIPOPROTEIN (HDL) CHOLESTEROL WITH HYPERTRIGLYCERIDEMIA: ICD-10-CM

## 2025-04-07 DIAGNOSIS — I71.21 ANEURYSM OF ASCENDING AORTA WITHOUT RUPTURE: ICD-10-CM

## 2025-04-07 DIAGNOSIS — Z87.74 S/P PATENT FORAMEN OVALE CLOSURE: ICD-10-CM

## 2025-04-07 DIAGNOSIS — Z02.89 ENCOUNTER FOR OTHER ADMINISTRATIVE EXAMINATIONS: ICD-10-CM

## 2025-04-07 DIAGNOSIS — Z00.00 HEALTH CARE MAINTENANCE: ICD-10-CM

## 2025-04-07 DIAGNOSIS — R41.840 CONCENTRATION DEFICIT: ICD-10-CM

## 2025-04-07 DIAGNOSIS — R03.0 ELEVATED BLOOD PRESSURE READING WITHOUT DIAGNOSIS OF HYPERTENSION: ICD-10-CM

## 2025-04-07 DIAGNOSIS — Z00.00 ROUTINE HISTORY AND PHYSICAL EXAMINATION OF ADULT: Primary | ICD-10-CM

## 2025-04-07 DIAGNOSIS — M25.571 RIGHT ANKLE PAIN, UNSPECIFIED CHRONICITY: ICD-10-CM

## 2025-04-07 DIAGNOSIS — N52.9 ERECTILE DYSFUNCTION, UNSPECIFIED ERECTILE DYSFUNCTION TYPE: ICD-10-CM

## 2025-04-07 DIAGNOSIS — G43.109 MIGRAINE WITH AURA AND WITHOUT STATUS MIGRAINOSUS, NOT INTRACTABLE: ICD-10-CM

## 2025-04-07 DIAGNOSIS — Z13.0 SCREENING FOR DEFICIENCY ANEMIA: ICD-10-CM

## 2025-04-07 DIAGNOSIS — E66.3 OVERWEIGHT: ICD-10-CM

## 2025-04-07 DIAGNOSIS — L80 VITILIGO: ICD-10-CM

## 2025-04-07 DIAGNOSIS — Z82.49 FAMILY HISTORY OF ABDOMINAL AORTIC ANEURYSM: ICD-10-CM

## 2025-04-07 DIAGNOSIS — Z71.89 ADVANCED DIRECTIVES, COUNSELING/DISCUSSION: ICD-10-CM

## 2025-04-07 DIAGNOSIS — Z13.21 ENCOUNTER FOR VITAMIN DEFICIENCY SCREENING: ICD-10-CM

## 2025-04-07 DIAGNOSIS — R79.89 ELEVATED LFTS: ICD-10-CM

## 2025-04-07 DIAGNOSIS — Z78.9 VEGETARIAN: ICD-10-CM

## 2025-04-07 DIAGNOSIS — D69.6 LOW PLATELET COUNT: ICD-10-CM

## 2025-04-07 DIAGNOSIS — R00.2 PALPITATIONS: ICD-10-CM

## 2025-04-07 DIAGNOSIS — I83.91 VARICOSE VEINS OF RIGHT LOWER EXTREMITY, UNSPECIFIED WHETHER COMPLICATED: ICD-10-CM

## 2025-04-07 DIAGNOSIS — Z86.73 HISTORY OF CVA (CEREBROVASCULAR ACCIDENT): ICD-10-CM

## 2025-04-07 DIAGNOSIS — Z71.85 VACCINE COUNSELING: ICD-10-CM

## 2025-04-07 DIAGNOSIS — D22.9 MULTIPLE PIGMENTED NEVI: ICD-10-CM

## 2025-04-07 PROBLEM — R73.03 PREDIABETES: Status: RESOLVED | Noted: 2025-04-07 | Resolved: 2025-04-07

## 2025-04-07 PROBLEM — R73.03 PREDIABETES: Status: ACTIVE | Noted: 2025-04-07

## 2025-04-07 PROBLEM — I83.93 ASYMPTOMATIC VARICOSE VEINS OF BOTH LOWER EXTREMITIES: Status: RESOLVED | Noted: 2023-06-05 | Resolved: 2025-04-07

## 2025-04-07 PROBLEM — D17.21 LIPOMA OF RIGHT FOREARM: Status: RESOLVED | Noted: 2022-03-07 | Resolved: 2025-04-07

## 2025-04-07 PROBLEM — M25.572 BILATERAL ANKLE PAIN, UNSPECIFIED CHRONICITY: Status: RESOLVED | Noted: 2023-06-05 | Resolved: 2025-04-07

## 2025-04-07 LAB
ALBUMIN SERPL BCG-MCNC: 4.5 G/DL (ref 3.5–5.2)
ALP SERPL-CCNC: 103 U/L (ref 40–150)
ALT SERPL W P-5'-P-CCNC: 68 U/L (ref 0–70)
ANION GAP SERPL CALCULATED.3IONS-SCNC: 10 MMOL/L (ref 7–15)
AST SERPL W P-5'-P-CCNC: 49 U/L (ref 0–45)
BASOPHILS # BLD AUTO: 0 10E3/UL (ref 0–0.2)
BASOPHILS NFR BLD AUTO: 1 %
BILIRUB DIRECT SERPL-MCNC: 0.36 MG/DL (ref 0–0.3)
BILIRUB SERPL-MCNC: 0.8 MG/DL
BUN SERPL-MCNC: 11.4 MG/DL (ref 6–20)
CALCIUM SERPL-MCNC: 9.4 MG/DL (ref 8.8–10.4)
CHLORIDE SERPL-SCNC: 105 MMOL/L (ref 98–107)
CHOLEST SERPL-MCNC: 110 MG/DL
CREAT SERPL-MCNC: 0.93 MG/DL (ref 0.67–1.17)
CREAT UR-MCNC: 171 MG/DL
EGFRCR SERPLBLD CKD-EPI 2021: >90 ML/MIN/1.73M2
EOSINOPHIL # BLD AUTO: 0.3 10E3/UL (ref 0–0.7)
EOSINOPHIL NFR BLD AUTO: 5 %
ERYTHROCYTE [DISTWIDTH] IN BLOOD BY AUTOMATED COUNT: 12 % (ref 10–15)
EST. AVERAGE GLUCOSE BLD GHB EST-MCNC: 111 MG/DL
FASTING STATUS PATIENT QL REPORTED: YES
FASTING STATUS PATIENT QL REPORTED: YES
FERRITIN SERPL-MCNC: 119 NG/ML (ref 31–409)
GLUCOSE SERPL-MCNC: 108 MG/DL (ref 70–99)
HBA1C MFR BLD: 5.5 % (ref 0–5.6)
HCO3 SERPL-SCNC: 26 MMOL/L (ref 22–29)
HCT VFR BLD AUTO: 43.9 % (ref 40–53)
HDLC SERPL-MCNC: 41 MG/DL
HGB BLD-MCNC: 14.9 G/DL (ref 13.3–17.7)
IMM GRANULOCYTES # BLD: 0 10E3/UL
IMM GRANULOCYTES NFR BLD: 0 %
IRON BINDING CAPACITY (ROCHE): 280 UG/DL (ref 240–430)
IRON SATN MFR SERPL: 28 % (ref 15–46)
IRON SERPL-MCNC: 78 UG/DL (ref 61–157)
LDLC SERPL CALC-MCNC: 45 MG/DL
LYMPHOCYTES # BLD AUTO: 1.6 10E3/UL (ref 0.8–5.3)
LYMPHOCYTES NFR BLD AUTO: 31 %
MCH RBC QN AUTO: 30.7 PG (ref 26.5–33)
MCHC RBC AUTO-ENTMCNC: 33.9 G/DL (ref 31.5–36.5)
MCV RBC AUTO: 91 FL (ref 78–100)
MICROALBUMIN UR-MCNC: <12 MG/L
MICROALBUMIN/CREAT UR: NORMAL MG/G{CREAT}
MONOCYTES # BLD AUTO: 0.5 10E3/UL (ref 0–1.3)
MONOCYTES NFR BLD AUTO: 10 %
NEUTROPHILS # BLD AUTO: 2.8 10E3/UL (ref 1.6–8.3)
NEUTROPHILS NFR BLD AUTO: 54 %
NONHDLC SERPL-MCNC: 69 MG/DL
PLATELET # BLD AUTO: 128 10E3/UL (ref 150–450)
POTASSIUM SERPL-SCNC: 5 MMOL/L (ref 3.4–5.3)
PROT SERPL-MCNC: 7.2 G/DL (ref 6.4–8.3)
RBC # BLD AUTO: 4.85 10E6/UL (ref 4.4–5.9)
SODIUM SERPL-SCNC: 141 MMOL/L (ref 135–145)
TRIGL SERPL-MCNC: 122 MG/DL
TSH SERPL DL<=0.005 MIU/L-ACNC: 2.52 UIU/ML (ref 0.3–4.2)
VIT B12 SERPL-MCNC: 594 PG/ML (ref 232–1245)
VIT D+METAB SERPL-MCNC: 37 NG/ML (ref 20–50)
WBC # BLD AUTO: 5.2 10E3/UL (ref 4–11)

## 2025-04-07 PROCEDURE — 80053 COMPREHEN METABOLIC PANEL: CPT | Performed by: FAMILY MEDICINE

## 2025-04-07 PROCEDURE — 82248 BILIRUBIN DIRECT: CPT | Performed by: FAMILY MEDICINE

## 2025-04-07 PROCEDURE — 83540 ASSAY OF IRON: CPT | Performed by: FAMILY MEDICINE

## 2025-04-07 PROCEDURE — 82306 VITAMIN D 25 HYDROXY: CPT | Performed by: FAMILY MEDICINE

## 2025-04-07 PROCEDURE — 36415 COLL VENOUS BLD VENIPUNCTURE: CPT | Performed by: FAMILY MEDICINE

## 2025-04-07 PROCEDURE — 82728 ASSAY OF FERRITIN: CPT | Performed by: FAMILY MEDICINE

## 2025-04-07 PROCEDURE — 82570 ASSAY OF URINE CREATININE: CPT | Performed by: FAMILY MEDICINE

## 2025-04-07 PROCEDURE — 83036 HEMOGLOBIN GLYCOSYLATED A1C: CPT | Performed by: FAMILY MEDICINE

## 2025-04-07 PROCEDURE — 85025 COMPLETE CBC W/AUTO DIFF WBC: CPT | Performed by: FAMILY MEDICINE

## 2025-04-07 PROCEDURE — 82607 VITAMIN B-12: CPT | Performed by: FAMILY MEDICINE

## 2025-04-07 PROCEDURE — 84443 ASSAY THYROID STIM HORMONE: CPT | Performed by: FAMILY MEDICINE

## 2025-04-07 PROCEDURE — 83550 IRON BINDING TEST: CPT | Performed by: FAMILY MEDICINE

## 2025-04-07 PROCEDURE — 80061 LIPID PANEL: CPT | Performed by: FAMILY MEDICINE

## 2025-04-07 PROCEDURE — 82043 UR ALBUMIN QUANTITATIVE: CPT | Performed by: FAMILY MEDICINE

## 2025-04-07 RX ORDER — MULTIVIT WITH MINERALS/LUTEIN
1000 TABLET ORAL DAILY
COMMUNITY

## 2025-04-07 RX ORDER — TADALAFIL 5 MG/1
5 TABLET ORAL
Qty: 8 TABLET | Refills: 3 | Status: SHIPPED | OUTPATIENT
Start: 2025-04-07

## 2025-04-07 SDOH — HEALTH STABILITY: PHYSICAL HEALTH: ON AVERAGE, HOW MANY DAYS PER WEEK DO YOU ENGAGE IN MODERATE TO STRENUOUS EXERCISE (LIKE A BRISK WALK)?: 3 DAYS

## 2025-04-07 SDOH — HEALTH STABILITY: PHYSICAL HEALTH: ON AVERAGE, HOW MANY MINUTES DO YOU ENGAGE IN EXERCISE AT THIS LEVEL?: 50 MIN

## 2025-04-07 ASSESSMENT — SOCIAL DETERMINANTS OF HEALTH (SDOH): HOW OFTEN DO YOU GET TOGETHER WITH FRIENDS OR RELATIVES?: TWICE A WEEK

## 2025-04-07 ASSESSMENT — PAIN SCALES - GENERAL: PAINLEVEL_OUTOF10: NO PAIN (0)

## 2025-04-07 NOTE — RESULT ENCOUNTER NOTE
Hello -    Here are my comments about your recent results:  -Cholesterol levels are at your goal levels.  ADVISE: continuing your medication, atorvastatin 20 mg, a regular exercise program with at least 150 minutes of aerobic exercise per week, and eating a low saturated fat/low carbohydrate diet.  Also, you should recheck this fasting cholesterol panel in 12 months.  -Kidney function (GFR) is normal.  -Sodium is normal.  -Potassium is normal.  -Calcium is normal.  -Glucose is slightly elevated in prediabetic range, eating a low carbohydrate diet, exercising, trying to lose weight (if necessary) and rechecking your glucose level in 12 months.  -TSH (thyroid stimulating hormone) level is normal which indicates normal thyroid function.  LFTs are normal except AST remains elevated mildly at 49.  Direct bilirubin is minimally elevated that may go up with fasting and not considered clinically significant.  Ultrasound liver ordered to evaluate elevated LFTs more.  -Vitamin B12 result is normal  -Vitamin D level is normal   -Ferritin (iron) level is normal.  -Iron level is normal.  For additional lab test information, [email or web address].[email or web address] is an excellent reference..    Please let us know if you have any questions or concerns.     Regards,  Izzy Hutson MD

## 2025-04-07 NOTE — RESULT ENCOUNTER NOTE
Hello -    Here are my comments about your recent results:  -Microalbumin (urine protein) test is normal.  ADVISE: rechecking this annually.  For additional lab test information, [email or web address] is an excellent reference..    Please let us know if you have any questions or concerns.     Regards,  Izzy Hutson MD

## 2025-04-07 NOTE — PROGRESS NOTES
The below note was dictated using voice recognition. Although reviewed after completion, some word and grammatical error may remain .       Preventive Care Visit  [facility]  Izzy Hutson MD, Family Medicine  Apr 7, 2025  {Provider  Link to SmartSet :424571}    {PROVIDER CHARTING PREFERENCE:670061}    *** Hemoglobin A1c is now back to normal at 5.5  Will continue to monitor continue with a low carbohydrate intake regular aerobic activity is doing  ***White count is normal.  Hemoglobin is 14 hematocrit 43 normal.  Platelets are low down to 128 compared to previously.  Recommend rechecking in a month to make sure it is trending okay or earlier if having any bruising or bleeding.  -Cholesterol levels are at your goal levels.  ADVISE: continuing your medication, atorvastatin 20 mg, a regular exercise program with at least 150 minutes of aerobic exercise per week, and eating a low saturated fat/low carbohydrate diet.  Also, you should recheck this fasting cholesterol panel in 12 months.  -Kidney function (GFR) is normal.  -Sodium is normal.  -Potassium is normal.  -Calcium is normal.  -Glucose is slightly elevated in prediabetic range, eating a low carbohydrate diet, exercising, trying to lose weight (if necessary) and rechecking your glucose level in 12 months.  -TSH (thyroid stimulating hormone) level is normal which indicates normal thyroid function.  LFTs are normal except AST remains elevated mildly at 49.  Direct bilirubin is minimally elevated that may go up with fasting and not considered clinically significant.  Ultrasound liver ordered to evaluate elevated LFTs more.  -Vitamin B12 result is normal  -Vitamin D level is normal   -Ferritin (iron) level is normal.  -Iron level is normal.  ***Normal microalbumin.    Modesto Mar is a 49 year old, presenting for the following:  Physical (And follow up)        4/7/2025     8:03 AM   Additional Questions   Roomed by Yen love    Accompanied by self         4/7/2025   Forms   Any forms needing to be completed Yes    Yes    No       Multiple values from one day are sorted in reverse-chronological order         4/7/2025     8:02 AM   Patient Reported Additional Medications   Patient reports taking the following new medications probiatic          HPI    Advance Care Planning  Patient has a Health Care Directive on file  Advance care planning document is on file and is current.      4/7/2025   General Health   How would you rate your overall physical health? Good   Feel stress (tense, anxious, or unable to sleep) Only a little   (!) STRESS CONCERN      4/7/2025   Nutrition   Three or more servings of calcium each day? Yes   Diet: Vegetarian/vegan   How many servings of fruit and vegetables per day? (!) 2-3   How many sweetened beverages each day? 0-1         4/7/2025   Exercise   Days per week of moderate/strenous exercise 3 days   Average minutes spent exercising at this level 50 min         4/7/2025   Social Factors   Frequency of gathering with friends or relatives Twice a week   Worry food won't last until get money to buy more No   Food not last or not have enough money for food? No   Do you have housing? (Housing is defined as stable permanent housing and does not include staying ouside in a car, in a tent, in an abandoned building, in an overnight shelter, or couch-surfing.) Yes   Are you worried about losing your housing? No   Lack of transportation? No   Unable to get utilities (heat,electricity)? No         4/7/2025   Fall Risk   Fallen 2 or more times in the past year? No   Trouble with walking or balance? No          4/7/2025   Dental   Dentist two times every year? Yes         5/29/2024   TB Screening   Were you born outside of the [place]? No     Today's PHQ-2 Score:       4/7/2025     7:56 AM   PHQ-2 ( 1999 [employer])   Q1: Little interest or pleasure in doing things 0   Q2: Feeling down, depressed or hopeless 0   PHQ-2 Score 0    Q1:  Little interest or pleasure in doing things Not at all   Q2: Feeling down, depressed or hopeless Not at all   PHQ-2 Score 0       Patient-reported         4/7/2025   Substance Use   Alcohol more than 3/day or more than 7/wk No   Do you use any other substances recreationally? No     Social History     Tobacco Use    Smoking status: Never     Passive exposure: Never    Smokeless tobacco: Never   Vaping Use    Vaping status: Never Used   Substance Use Topics    Alcohol use: Yes     Comment: 5 dirnks a week     Drug use: Never     {Provider  If there are gaps in the social history shown above, please follow the link to update and then refresh the note Link to Social and Substance History :561577}      4/7/2025   STI Screening   New sexual partner(s) since last STI/HIV test? No   ASCVD Risk   The ASCVD Risk score (Pascale REYNA, et al., 2019) failed to calculate for the following reasons:    Risk score cannot be calculated because patient has a medical history suggesting prior/existing ASCVD    {Provider  REQUIRED FOR AWV Use the storyboard to review patient history, after sections have been marked as reviewed, refresh note to capture documentation:920857}   Reviewed and updated as needed this visit by Provider                    Past Medical History:   Diagnosis Date    Asymptomatic varicose veins of both lower extremities 06/05/2023    Bilateral ankle pain, unspecified chronicity 06/05/2023    BMI 27.0-27.9,adult 08/11/2021    Cerebrovascular accident (CVA) involving cerebellum (H) 07/19/2022    Cervical radiculitis 02/15/2021    Esophageal reflux 04/13/2021    History of decreased platelet count 08/11/2021    Leukopenia, unspecified type 08/11/2021    Lipoma of right forearm 03/07/2022    Lipoma of skin and subcutaneous tissue 03/23/2021    Added automatically from request for surgery [id number]    Migraine 06/20/2016    Formatting of this note might be different from the original. Related to dehydration.     Patent foramen ovale 09/26/2022    Vitiligo      Past Surgical History:   Procedure Laterality Date    ARTHROSCOPY KNEE      EXCISE LESION BACK Left 4/26/2021    Procedure: EXCISION, LESION, BACK;  Surgeon: Daniel Barbosa MD;  Location: [facility] OR     KNEE SCOPE, DIAGNOSTIC      Description: Arthroscopy Knee Right;  Recorded: 05/07/2013;    REMOVAL OF SPERM DUCT(S)      Description: Surgery Of Male Genitalia Vasectomy;  Recorded: 05/07/2013;    VASECTOMY       OB History   No obstetric history on file.     Lab work is in process  Labs reviewed in EPIC  BP Readings from Last 3 Encounters:   04/07/25 132/86   02/12/25 122/80   06/03/24 112/79    Wt Readings from Last 3 Encounters:   04/07/25 95.5 kg (210 lb 9.6 oz)   02/12/25 96.6 kg (213 lb)   06/03/24 96.3 kg (212 lb 4.8 oz)         Patient Active Problem List   Diagnosis    Abnormally low high density lipoprotein (HDL) cholesterol with hypertriglyceridemia    Varicocele    Vitiligo    History of CVA (cerebrovascular accident)    Migraine headache with aura    Overweight    S/P patent foramen ovale closure    Varicose veins of right lower extremity, unspecified whether complicated    Ascending aortic aneurysm    Family history of abdominal aortic aneurysm    Palpitations    Prediabetes    Elevated LFTs    Multiple pigmented nevi     Past Surgical History:   Procedure Laterality Date    ARTHROSCOPY KNEE      EXCISE LESION BACK Left 4/26/2021    Procedure: EXCISION, LESION, BACK;  Surgeon: Daniel Barbosa MD;  Location: [facility] OR     KNEE SCOPE, DIAGNOSTIC      Description: Arthroscopy Knee Right;  Recorded: 05/07/2013;    REMOVAL OF SPERM DUCT(S)      Description: Surgery Of Male Genitalia Vasectomy;  Recorded: 05/07/2013;    VASECTOMY         Social History     Tobacco Use    Smoking status: Never     Passive exposure: Never    Smokeless tobacco: Never   Substance Use Topics    Alcohol use: Yes     Comment: 5 dirnks a week      Family History   Problem  Relation Age of Onset    Hypertension Mother     Cerebrovascular Disease Mother         patent foramen ovale    Seizure Disorder Mother     Diabetes Father     Diabetes Paternal Grandmother                  Current Outpatient Medications   Medication Sig Dispense Refill    atorvastatin (LIPITOR) 20 MG tablet Take 1 tablet (20 mg) by mouth daily 90 tablet 3    cholecalciferol (VITAMIN D3) 125 mcg (5000 units) capsule       co-enzyme Q-10 100 MG CAPS capsule       Cyanocobalamin (VITAMIN B 12 PO) daily b complex      LUTEIN PO       OMEGA-3 FATTY ACIDS PO       vitamin C (ASCORBIC ACID) 500 MG tablet Take 500 mg by mouth       Allergies   Allergen Reactions    Triptans Other (See Comments)     History of cerebellar CVA     Recent Labs   Lab Test 24  0845 23  0919 22  1000 22  1213 22  1213 21  0855 19  0826 19  0826   A1C 5.7* 5.5 5.8*  --  5.5 5.6  --  5.6   LDL 42 83 95  --   --  91  --  94   HDL 37* 35* 35*  --   --  37*  --  31*   TRIG 110 124 151*  --   --  141  --  150*   ALT 71* 32 46  --  44 63  --  27   CR 0.93 0.93 0.83  --  0.79 0.87  --  0.91   GFRESTIMATED >90 >90 >90  --  >90 >90   < > >60   GFRESTBLACK  --   --   --   --   --  >90  --  >60   POTASSIUM 4.4 4.1 4.5   < > 4.2 4.3  --  4.6   TSH  --   --  2.02  --  2.27 1.87  --  2.39    < > = values in this interval not displayed.           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, with history of right cerebellar stroke with word finding difficulty and right-sided clumsiness in 2022 resolved with time, negative hypercoagulable work-up, work-up did not reveal a PFO s/p closure at [facility] 2022, hx of cervical radiculitis treated with Flexeril prn in the past, hx of migraine with aura secondary to dehydration in 2017, and advised to avoid triptans due to history of stroke, elevated TG in past, low HDL, prediabetes, ** Mildly symptomatic right calf varicose veins,  CEAP: C2Long history of asymptomatic varicocele History of unexplained cerebellar stroke July 2022 with large PFO status postclosure November 2022. Family history of AAA* *** hx of resolved esophageal reflux, vitiligo noted since 2008, lipoma, left back s/p excision 4/2021, has lipoma right forearm, vitiligo, multiple pigmented nevi under care of dermatology, hx of impaired glucose, prediabetes, history of low platelets, low white count in the past, varicocele, ED on Cialis as needed, on Vit B,C, D, fish oil and lutein, prior noted ulcer with NSAID use, history of COVID, is a vegetarian, previously under care of PCP Dr Marino,   Seen by PCP 2/10/21 for left neck and shoulder pain, Dx with left cervical radiculopathy and advised PT and given Flexeril. U/S done 2/15 for large subcutaneous mass left lateral upper back showed a lipoma, referred to the surgeon, seen 3/22/21 and noted to get surgically removed under local anesthesia.     Seen first time on 4/14/2021 by this provider for a preop for lipoma excision under local. Camp physical & preventive labs done. Addressed hx of impaired glucose, elevated TG in past, BMI, vitiligo hx and labs done showed glucose of 104, Hba1c was normal at 5.5, negative for Hep C, HIV, normal TSH, Low HDL, LDL & TG were normal, Rest of BMP normal. CBC showed stable low wbc and platelets of 145 previously 115. Reminded to work on advance directives. Was to continue routine care & preventive physical with PCP Dr Marino. Had lipoma excision left back on 4/26/21.   Seen 8/11/21 for preventive health and additional concerns.  Advised self testicular check regularly. Deferred checking labs done in April . Cbc done due to low wbc & platelets in the past. Noted had recovered from lipoma excision & coincidentally the nerve pain he'd had prior to that resolved 80 % after surgery. Felt the left neck pinched nerve was better doing some strength training, & stretching helped & declined need to see  ortho. Had completed PT around same time had excision of lipoma and strangely noted improvement after lipoma excision. Noted had gone mostly vegetarian since feb, eliminated 80 percent red meat, was eating some fish and chicken. BMI down from 29 to 27. Vitiligo was unchanged, had done UVB Rx 5 yrs prior & seen derm also for hx of prior atypical nevi on Bx. Due for a follow up & was to schedule with derm on his own. Hx of varicocele not bothering him, dx in high school, dad of three. Had a Lipoma right forearm not bothering him, unchanged. Was to monitor. Everyone in family vaccinated against Covid. Hx of vit d def in past, took in the winter time. Health care maintenance reviewed. Was to follow up with the dentist. For colon cancer screening, colonoscopy ordered.   Vitamin D came back low ferritin and B12 normal and advise increase vitamin D in diet.  WBC and platelets were low but stable to prior and possibly his new baseline.  Colonoscopy done 11/29/2021 was normal and advised to recheck in 10 years.  Call to get checked for testosterone 12/8/2021 request sent in and this came back normal.  On 2/9 communicated via [facility] about numbness and tingling right hand 3 to 5 days.  Referred to orthopedics and physical therapy.  Opted to hold off on physical therapy seen by neurosurgeon on 2/14/2022 MRI C-spine advised to consider C-spine traction.  MN  oxycodone 5 mg # 10 on 4/26/21     Seen 3/7/22 for numbness tingling right hand, right arm pain, hx of cervical radiculopathy & right upper back pain between scapula and spine that started after episode of snowboarding in December and had persisted, seen by neurosurgeon and MRI C-spine to be done  For ED noted testosterone in the fall had been normal.  Vitamin D was normal, B12, CMP normal other than elevated glucose TSH was normal.  Hemoglobin A1C in prediabetic range.  CBC showed chronically low platelets and white count not different from before.  Felt stress and  age-related.  Urology referral given.  Discussed options and felt Cialis covered by insurance.  Prescribed to take 5 mg half an hour prior to sexual activity as needed.  May be effective for 36 hours after single dose.  Do not split dose.  Counseled about risk of flushing, headache, sinus infection, priapism. Avoid any alpha blockers on this medication.  If should have chest pain or EMS comes to the house or presents to ER needs to disclose if used this medication in the prior 48 hours to prevent being given meds that may cause drop in blood pressure.  Given number 12 tablets to try if not helpful could consider next dose up.   Was to continue care with dermatology regarding vitiligo.  Continue with sun protection. Check vitamin D and CBC given prior history of low white count platelets and vitamin D.  Had an elevated blood pressure.  Recheck was borderline.  This was new. Felt related to stress and pain.  To check at home and if consistently over 130 x 80 to give us a call.  Decrease salt in diet, limit alcohol to 1 drink in a 24 hr time frame.  No more than 7 total a week, avoid anti-inflammatories as much as possible.  Limit caffeine intake.  Stay active and work on losing a few pounds.  Recheck at upcoming physical  Due to elevated blood pressure, low HDL elevated triglycerides, BMI 28, family history of heart disease discussed and referred to preventive cardiology for evaluation.  Last physical noted  done in August.  But needed a camp physical done in May and June so could make a big hiking camping trip in July.      MRI C-spine done 3/7/2022 showed multilevel cervical spondylosis mostly at C6-7 with moderate to severe neural foraminal stenosis without evidence of spinal stenosis.  Referred to PT did traction and that helped and was discharged from physical therapy/11/22.  On 4/26/2022 pre participation camp physical form filled based on 2021 physical visit.     On 7/22/2022 seen at [facility] ER for word finding  difficulty right-sided clumsiness and work-up revealed acute cerebellar right-sided CVA and TTE showed positive bubble study showing a PFO with a negative hypercoagulable work-up.  Seen at [facility] and ultimately evaluated to have a cryptogenic stroke with negative hypercoagulable work-up, negative labs, negative Dopplers, a moderate sized PFO and was referred to interventional cardiology and had PFO closure done November 14 at [facility]. In between also had COVID October 1, 2022 treated with Paxlovid and had a rebound after completed med that resolved on its own.     Seen 11/28/22 for preventive health and additional concerns. Reviewed the history of right cerebellar [facility] with resolved word finding difficulty and right-sided clumsiness improved.  Writing was not back to baseline but improved from before.  Seen by neurology and interventional neurologist at the [facility] post stroke. No known hypercoagulable state found.  Assessed to be cryptogenic thrombotic nature, no DVT found on ultrasound.  PFO found s/p closure November 14 at the [facility]. Canon was doing well.  Was to follow-up with cardiology as planned in a few weeks at [facility], to discuss loop recorder with them.  Was to continue aspirin 81 mg daily. Recommended continuing statin may be not at 80 mg dosing given post stroke but to see what lipids showed and go from there. Had a history of historically low HDL and hypertriglyceridemia likely genetic. Counseled that statins decrease endothelial damage and cardiovascular risk. Blood pressure was looking improved. History of migraines with visual aura to avoid all triptans given stroke history. To stay well-hydrated as dehydration appeared to be migraine trigger historically. Remained vegetarian to continue with B complex. Weight had improved, to continue to work on eating healthy and keep BMI in a healthy range. History of historically low platelets and white cell count  that was asymptomatic. Lipoma right forearm and multiple pigmented nevi under care of  Dermatology. History of vitiligo unchanged to follow-up with dermatology as planned. Hx of Urticaria of unknown cause ruled out for vasculitis in April 2022 by dermatology. History of COVID s/p Paxlovid in October with rebound COVID symptoms since resolved. Varicocele unchanged and asymptomatic. ED managed with Cialis 5 mg as needed #12 given with 11 refills. Was prescribed to take 5 mg half an hour prior to sexual activity as needed.  Felt could be effective for 36 hours after a single dose.  Advised to not split dose.  Counseled about risk of flushing, headache, sinus infection, priapism. Avoid any alpha blockers on this medication.  If should go to the ER or EMS came to the house needed to disclose if used this medication in the prior 48 hours to prevent being given meds that could cause a drop in blood pressure.       Healthcare maintenance reviewed. Noted had ACP at home and on file. Colon cancer screening due in 2031. Flu shot & Moderna bivalent booster given & to consider Prevnar 20 given history of PFO. To consider hepatitis B vaccination if immune testing showed was not immune.  These later came back showing immunity. If traveling outside the country recommend being seen by the travel clinic. Was to return in 1 year for preventive physical or sooner in an office visit for any new  Concerns.   Labs showed normal Microalbumin, , HDL low, LDL 95, CMP with elevated glucose, normal TSH, B12, cbc & Hep B immunity positive. Hba1c elevated at 5.8.     Seen 6/5/23 *** for follow-up.  Prior right cerebellar stroke July 2022. History of right cerebellar [facility] with resolved word finding difficulty and right-sided clumsiness. Seen by neurology and interventional neurologist at the [facility] post stroke. No known hypercoagulable state found.  Assessed to be cryptogenic thrombotic nature, no DVT found  on ultrasound.  PFO found s/p closure November 14 at the [facility].  Notes feeling well. Stopped taking atorvastatin since 2 months after stroke. Was on 80 mg. Thinking 20 would be better. LDL was 95 in nov 2022 on no meds. Did send a message to [place] neurologist & was told the benefit of statin in stroke was not studied in his age group. As far as stroke prevention told not necessary. Will recheck lipids today. He understood the stroke was felt to be due to the PFO since closed.  Is on aspirin 81 mg and no longer on statin. Later after the visit labs showed total cholesterol, HDL and triglycerides looked good. LDL is 83.  Liver and gallbladder tests (ALT,AST, Alk phos,bilirubin)  & electrolytes are normal. See below.     Has had chronically low HDL and elevated triglycerides.  Will recheck lipids today and make recommendations ideally post stroke LDL should be less than 70.  Could consider CT calcium score to get more objective data of statin beneficial or not.  May discuss with [place] cardiologist when gets echocardiogram with them in a month. Later after the visit labs showed total cholesterol, HDL and triglycerides looked good. LDL is 83.  Liver and gallbladder tests (ALT,AST, Alk phos,bilirubin)  & electrolytes are normal. Given prior history of stroke recommended should be less than 70 ideally. I will send in atorvastatin 20 mg bedtime to his pharmacy number 90 pills and recommend rechecking fasting cholesterol on this dose in about 2 to 3 months before run out of the medication to see if it makes a difference. The other option might be to consider doing a CT calcium score to assess coronary calcifications and objective risk to guide statin treatment or can discuss more with his cardiologist at the [facility] as well when sees  them in a month    S/p PFO closure in November 2022 reports had arrhythmia that was not alarming in December or January since resolved.  No records available. Continue to  monitor.    Blood pressure is looking better.    We will check hemoglobin A1C given history of prediabetes recommend low carbohydrate intake. Later labs showed  Fasting glucose is slight elevated and may be a sign of early diabetes (prediabetes) though Hba1c was normal today. Advised eating a low carbohydrate diet, exercising, trying to lose weight (if necessary) and rechecking glucose level in 6 months.    BMI stable at 27 continue to eat healthy and be active.    Migraines with visual aura no recent issues, to avoid triptans given stroke history.    Continue with B complex and supplements. As mostly vegetarian    Resolved low platelets and low white count. Asymptomatic historically low counts in the past.    To follow-up with [facility] for right forearm lipoma that has become troublesome and for skin check given multiple pigmented nevi.    Vitiligo unchanged & sun protection advised.    No recurrence of urticaria of unknown etiology since April 2022. Was ruled out for vasculitis with Bx by derm.     Recovered from COVID October 2022 in addition to relapse. Wonder if that could have contributed to palpitations in dec and jan .     ED managed with Cialis 5 mg as needed #12 given with 11 refills in nov. Counseled about risk of flushing, headache, sinus infection, priapism. Avoid any alpha blockers on this medication.  If should have chest pain or EMS comes to the house or presents to ER needs to disclose if used this medication in the prior 48 hours to prevent being given meds that may cause drop in blood pressure    Varicocele unchanged.     Varicose vein right leg may be contributing to discoloration right lower leg.  Unlikely to be causing ankle pain in the morning suspect that might be more from arthritis.  Recommend wearing compression socks during the day for 3 months and follow-up with vascular to assess if surgical treatment needed.  May discuss more with them. DME script for compression socks 15 to 20  mm hg given to try form a home medical store. Consider xray ankle and referral to ortho in the future if ankle symptoms get worse currently symptoms not suggestive of rheumatoid arthritis.     Camp form for high intensity [facility] backpacking hiking trip with scouts and 3 other adults form filled.  Please see form for details, currently no restrictions noted.  Pre participation physical form for [place] filled.  Noted the high adventurous advisory.  The [place] experience was not risk-free however by taking responsibility for ones own health and safety and cooperating with staff it is expected that most participants will have an enjoyable safe [place] experience.  Summer temperatures could go 30 to 100 degrees low humidity and frequent sometimes severe thunderstorms.  For summer hikes each participant must be able to carry 25 to 35% of their own body weight & be able to hike 5 to 12 miles per [facility] and elevations range from 6500 to 12,500 feet or trails that are steep and sid.  He does not have any special dietary needs.  Tetanus is up-to-date as is COVID.  Is immune to hepatitis A and noted has immunity to hepatitis B from prior vaccination when younger, pneumonia vaccine given today.  Vaccinations such as MMR varicella and meningococcal disease is recommended but currently not mandatory, there is no records of these in our chart or in [facility] not noted on his immunization record.  There is no allergy or anaphylaxis.  Unexplained urticaria last year has not recurred.  He needs to take enough medication for the duration of the trip.  And be aware that altitude heat exertion can affect the medications efficacy.  There is no history of seizures or hypertension or diabetes or asthma.  He has had a stroke in the past reported by him that neurology said it was due to PFO which has since been closed.  There is no cardiac disease per se.  He has had no recent musculoskeletal injuries  or orthopedic surgery.  He does have varicose veins on the right with some achiness in the right ankle in the morning that resolves with activity.  He has been advised to use compression socks.  There is no psychological or emotional condition currently.  His BMI is 27 he does not exceed the maximum acceptable weight limit for his height.  He does exceed the weight limit for horseback riding which is 200 pounds and he is 205 pounds.  Staff and physicians at the [place] reserve the right to deny the participation of any individual based on a physical exam and a medical history.  Each participant is subject to medical recheck at [facility].  Currently he is on aspirin 81 mg daily and takes supplements vitamin D, B12 routinely & fish oil, B complex, and vitamin C and Cialis as needed.  He has a lipoma right forearm and varicose veins right leg and currently I certify that he has no contraindication for participation in a scouting experience.  Including the high adventure program.  Form was filled signed stamped original given to patient and copy made for our records.    Prevnar 20 given today.    Noted immunity to hepatitis B on prior titers & updated epic chart    To return in November/ December for routine preventive physical and sooner in an office visit for any new concerns  ***    CURRENTLY   Here for a physical & follow up. Last seen by this provider 6/2023 ***  HM reviewed  Has ACP unchanged on file    No  concerns  Colon cancer screneing due 20231  No symptoms, started taking a probiotic since las summer   More ferq heart burn, in 6/2024, prior to that famotidine prn got more freq  Did 3 monsth PPi  A lot better since then hold of on EGD   Infreq heart bunr [facility] estopped  In [facility] 1 week ago and had more  Freq small meals,   *** no blodo n stool or balck stools  H yplori    Vaccine sutd      Has had chronically low HDL and elevated triglycerides.  Will recheck lipids today and make recommendations ideally  post stroke LDL should be less than 70.  Could consider CT calcium score to get more objective data of statin beneficial or not.  May discuss with [place] cardiologist when gets echocardiogram with them in a month. Later after the visit labs showed total cholesterol, HDL and triglycerides looked good. LDL is 83.  Liver and gallbladder tests (ALT,AST, Alk phos,bilirubin)  & electrolytes are normal. Given prior history of stroke recommended should be less than 70 ideally. I will send in atorvastatin 20 mg bedtime to his pharmacy number 90 pills and recommend rechecking fasting cholesterol on this dose in about 2 to 3 months before run out of the medication to see if it makes a difference. The other option might be to consider doing a CT calcium score to assess coronary calcifications and objective risk to guide statin treatment or can discuss more with his cardiologist at the [facility] as well when sees  them in a month  On statin  Ldl lu in 2023at [place]   LDL 47   Also  q 10 80 %   No point doing ct calcium score   Lat one in 2023 was good    Prior right cerebellar stroke July 2022. History of right cerebellar stroke in July dx at [facility] er with resolved word finding difficulty and right-sided clumsiness. Seen by neurology and interventional neurologist at the [facility] post stroke. No known hypercoagulable state found.  Assessed to be cryptogenic thrombotic nature, no DVT found on ultrasound.  PFO found s/p closure November 14 at the [facility].  Notes feeling well. Stopped taking atorvastatin since 2 months after stroke. Was on 80 mg. Thinking 20 would be better. LDL was 95 in nov 2022 on no meds. Did send a message to [place] neurologist & was told the benefit of statin in stroke was not studied in his age group. As far as stroke prevention told not necessary. Will recheck lipids today. He understood the stroke was felt to be due to the PFO since closed.  Is on aspirin 81 mg and no longer on statin. Later after  the visit labs showed total cholesterol, HDL and triglycerides looked good. LDL is 83.  Liver and gallbladder tests (ALT,AST, Alk phos,bilirubin)  & electrolytes are normal. See below.  No new symptoms    S/p PFO closure in November 2022 reports had arrhythmia that was not alarming in December or January since resolved.  No records available. Continue to monitor.  Last devora saw [facility] cards in 2023  Seen by local card srecently    Aortic aneurys, ***    Palpitatiosn ****pvc on zio done 2/2025 ***  Seen as increase dferq and now more  Les sinc shay     Blood pressure is looking better. Bp 132/82, usually better     prediabetes Hba1c 5.7 6/2024 ***     BMI stable up to 28 continue to eat healthy and be active.  But ht amrked shorter  Wt current 210  In 2023 205  Home sclae 204 to 209   Working out more  Gym 2 to 3 / week  17 yr fitness  Planning  camp   Will rop off form later     Hx of heart burn improved   In 6/2024 *** More ferq heart burn, in 6/2024, prior to that famotidine prn got more freq  Did 3 monsth PPi  A lot better since then hold of on EGD   Infreq heart bunr [facility] estopped  In [facility] 1 week ago and had more  Freq small meals,   *** no blodo n stool or balck stools  H yplori***   Thinkin about semaglutide   Not yet     Elevated lft in June  N alcohol , decreased  If elevated can do u/s     Migraines with visual aura no recent issues, to avoid triptans given stroke history.  Since stroke vigilant about symptoms  May have had visual aura one to 2 in past 1 yr  A lot o ftiem dehydartion rleated     Continue with B complex and supplements. As mostly vegetarian    Resolved low platelets and low white count. Asymptomatic historically low counts in the past.    Seen by Derm consultants   S/p removal right forearm lipoma   Has  multiple pigmented nevi. Skin check demr downstairs    Vitiligo unchanged & sun protection advised.  Mor eobvious after week in differnetial  Mild progressed     No recurrence of  urticaria of unknown etiology since April 2022. Was ruled out for vasculitis with Bx by derm.   Recovered from COVID October 2022 in addition to relapse. Wonder if that could have contributed to palpitations in dec and jan .    Right ankle pain  To see podiatrist  ? Plantar facsitis, started shoes advised for wifewith similar symptome, if wear new balance all day feet feel better , other shoe strigger     Varicose vein right leg seen by vascular in 2024, to talk to them again  may be contributing to discoloration right lower leg.    They felt Unlikely to be causing ankle pain in the morning suspect that might be more from arthritis.  After seen by vas worse com sock 3 months  No change, ot doen in 1 yr, to go back to [facility] at [facility] [facility]     Varicocele unchanged. ? Side maybe B/l     ED managed with Cialis 5 mg as needed #12 given with 11 refills in nov. Counseled about risk of flushing, headache, sinus infection, priapism. Avoid any alpha blockers on this medication.  If should have chest pain or EMS comes to the house or presents to ER needs to disclose if used this medication in the prior 48 hours to prevent being given meds that may cause drop in blood pressure  Using cialis 5 mg 1 to 2/ week, no side fefects,       Camp form for high intensity [facility] backpacking hiking trip with scouts and 3 other adults form filled.  Please see form for details, currently no restrictions noted.    *** Pre participation physical form for [place] filled.  Noted the high adventurous advisory.  The [place] experience was not risk-free however by taking responsibility for ones own health and safety and cooperating with staff it is expected that most participants will have an enjoyable safe [employer] experience.  Summer temperatures could go 30 to 100 degrees low humidity and frequent sometimes severe thunderstorms.  For summer hikes each participant must be able to carry 25 to 35% of their own body weight &  be able to hike 5 to 12 miles per day mountain [facility] and elevations range from 6500 to 12,500 feet or trails that are steep and sid.  He does not have any special dietary needs.  Tetanus is up-to-date as is COVID.  Is immune to hepatitis A and noted has immunity to hepatitis B from prior vaccination when younger, pneumonia vaccine given today.  Vaccinations such as MMR varicella and meningococcal disease is recommended but currently not mandatory, there is no records of these in our chart or in [facility] not noted on his immunization record.  Had chicken pox as a child. There is no allergy or anaphylaxis.  Unexplained urticaria last year has not recurred.  He needs to take enough medication for the duration of the trip.  And be aware that altitude heat exertion can affect the medications efficacy.  There is no history of seizures or hypertension or diabetes or asthma.  He has had a stroke in the past reported by him that neurology said it was due to PFO which has since been closed.  There is no cardiac disease per se.  He has had no recent musculoskeletal injuries or orthopedic surgery.  Right ankle going to see podiatry. He does have varicose veins on the right with some achiness in the right ankle in the morning that resolves with activity.  He has been advised to use compression socks.  Advised to use compression sock, There is no psychological or emotional condition currently.  His BMI is 28  ***  he does not exceed the maximum acceptable weight limit for his height.  *** He does exceed the weight limit for horseback riding which is 200 pounds and he is 205 pounds.  Staff and physicians at the [place] reserve the right to deny the participation of any individual based on a physical exam and a medical history.  Each participant is subject to medical recheck at [place].  Currently he is on aspirin 81 mg daily and takes supplements vitamin D, B12 routinely & fish oil, B complex, and vitamin C and Cialis as  "needed.  Resolved lipoma right forearm , has ** varicose veins right leg and currently I certify that he has no contraindication for participation in a scouting experience.  Including the high adventure program.  Form was filled signed stamped original given to patient and copy made for our records.    ***want to get assessed for adult adhd  Inabilty to stay foccuse dfor too longalwasy something  Feel getting worse  Dsicus taint done = ds and not every one need rx, curr enc mul task, soc med screen affect concetn too. Risk of storke, palp, elevate dbp   Weight ris ronna, refer for d ass then to [facility] de after that       Review of Systems  Constitutional, HEENT, cardiovascular, pulmonary, GI, , musculoskeletal, neuro, skin, endocrine and psych systems are negative, except as otherwise noted.     Objective    Exam  /86 (BP Location: Right arm, Patient Position: Sitting, Cuff Size: Adult Regular)   Pulse 78   Temp 97  F (36.1  C) (Temporal)   Resp 18   Ht 1.816 m (5' 11.5\")   Wt 95.5 kg (210 lb 9.6 oz)   SpO2 99%   BMI 28.96 kg/m     Estimated body mass index is 28.96 kg/m  as calculated from the following:    Height as of this encounter: 1.816 m (5' 11.5\").    Weight as of this encounter: 95.5 kg (210 lb 9.6 oz).    Physical Exam  {Exam Choices (Optional):344264}    Signed Electronically by: Izzy Hutson MD  "

## 2025-04-07 NOTE — RESULT ENCOUNTER NOTE
Hello -    Here are my comments about your recent results:  Hemoglobin A1c is now back to normal at 5.5  Will continue to monitor continue with a low carbohydrate intake regular aerobic activity is doing    Please let us know if you have any questions or concerns.     Regards,  Izzy Hutson MD

## 2025-04-07 NOTE — RESULT ENCOUNTER NOTE
Hello -    Here are my comments about your recent results:  White count is normal.  Hemoglobin is 14 hematocrit 43 normal.  Platelets are low down to 128 compared to previously.  Recommend rechecking in a month to make sure it is trending okay or earlier if having any bruising or bleeding.    Please let us know if you have any questions or concerns.     Regards,  Izzy Hutson MD

## 2025-04-07 NOTE — PATIENT INSTRUCTIONS
Patient Education   Preventive Care Advice   This is general advice given by our system to help you stay healthy. However, your care team may have specific advice just for you. Please talk to your care team about your preventive care needs.  Nutrition  Eat 5 or more servings of fruits and vegetables each day.  Try wheat bread, brown rice and whole grain pasta (instead of white bread, rice, and pasta).  Get enough calcium and vitamin D. Check the label on foods and aim for 100% of the RDA (recommended daily allowance).  Lifestyle  Exercise at least 150 minutes each week  (30 minutes a day, 5 days a week).  Do muscle strengthening activities 2 days a week. These help control your weight and prevent disease.  No smoking.  Wear sunscreen to prevent skin cancer.  Have a dental exam and cleaning every 6 months.  Yearly exams  See your health care team every year to talk about:  Any changes in your health.  Any medicines your care team has prescribed.  Preventive care, family planning, and ways to prevent chronic diseases.  Shots (vaccines)   HPV shots (up to age 26), if you've never had them before.  Hepatitis B shots (up to age 59), if you've never had them before.  COVID-19 shot: Get this shot when it's due.  Flu shot: Get a flu shot every year.  Tetanus shot: Get a tetanus shot every 10 years.  Pneumococcal, hepatitis A, and RSV shots: Ask your care team if you need these based on your risk.  Shingles shot (for age 50 and up)  General health tests  Diabetes screening:  Starting at age 35, Get screened for diabetes at least every 3 years.  If you are younger than age 35, ask your care team if you should be screened for diabetes.  Cholesterol test: At age 39, start having a cholesterol test every 5 years, or more often if advised.  Bone density scan (DEXA): At age 50, ask your care team if you should have this scan for osteoporosis (brittle bones).  Hepatitis C: Get tested at least once in your life.  STIs (sexually  transmitted infections)  Before age 24: Ask your care team if you should be screened for STIs.  After age 24: Get screened for STIs if you're at risk. You are at risk for STIs (including HIV) if:  You are sexually active with more than one person.  You don't use condoms every time.  You or a partner was diagnosed with a sexually transmitted infection.  If you are at risk for HIV, ask about PrEP medicine to prevent HIV.  Get tested for HIV at least once in your life, whether you are at risk for HIV or not.  Cancer screening tests  Cervical cancer screening: If you have a cervix, begin getting regular cervical cancer screening tests starting at age 21.  Breast cancer scan (mammogram): If you've ever had breasts, begin having regular mammograms starting at age 40. This is a scan to check for breast cancer.  Colon cancer screening: It is important to start screening for colon cancer at age 45.  Have a colonoscopy test every 10 years (or more often if you're at risk) Or, ask your provider about stool tests like a FIT test every year or Cologuard test every 3 years.  To learn more about your testing options, visit:   .  For help making a decision, visit:   https://bit.ly/ze72741.  Prostate cancer screening test: If you have a prostate, ask your care team if a prostate cancer screening test (PSA) at age 55 is right for you.  Lung cancer screening: If you are a current or former smoker ages 50 to 80, ask your care team if ongoing lung cancer screenings are right for you.  For informational purposes only. Not to replace the advice of your health care provider. Copyright   2023 [employer]. All rights reserved. Clinically reviewed by the [facility]itions Program. EDMdesigner 928830 - REV 01/24.

## 2025-04-09 ENCOUNTER — MYC MEDICAL ADVICE (OUTPATIENT)
Dept: FAMILY MEDICINE | Facility: CLINIC | Age: 50
End: 2025-04-09
Payer: COMMERCIAL

## 2025-04-09 NOTE — TELEPHONE ENCOUNTER
[facility] staff alerted writer Monday evening a patient had been by and did not wait to have discussion regarding form intake process. Message was left with him on our process and that he needs to complete. Will await him coming on site to complete-

## 2025-04-09 NOTE — TELEPHONE ENCOUNTER
I am going to fill his camp form as thats what I told him.  It was not in my inbox till yesterday evening . I forgot about the whole OXANA issue. So it was my fault, please if can reach out to him. I'm sorry for the extra process but maybe when he comes to  the camp form he dropped off he can sign an oxana so we cover all basis

## 2025-04-09 NOTE — TELEPHONE ENCOUNTER
Connected w/ clinic manager to discuss stepping out of [facility] process.  Conclusion is pass along forms to PCP and once complete scan to [facility]  Verbal consent does not apply, and signing consents for other needs/organizations outside of [employer] would not be valid to send this anywhere outside.  Form placed in form folder CARE TEAM 4

## 2025-04-10 ENCOUNTER — TELEPHONE (OUTPATIENT)
Dept: FAMILY MEDICINE | Facility: CLINIC | Age: 50
End: 2025-04-10
Payer: COMMERCIAL

## 2025-04-10 NOTE — TELEPHONE ENCOUNTER
Preparticipation physical form Part C page sent in filled today for  camp based on physical done few days ago.  Please make a copy for chart and give the original back to the patient thank you

## 2025-04-11 PROBLEM — D69.6 LOW PLATELET COUNT: Status: ACTIVE | Noted: 2025-04-11

## 2025-04-11 PROBLEM — N52.9 ERECTILE DYSFUNCTION, UNSPECIFIED ERECTILE DYSFUNCTION TYPE: Status: ACTIVE | Noted: 2025-04-11

## 2025-07-28 DIAGNOSIS — Z86.73 HISTORY OF CVA (CEREBROVASCULAR ACCIDENT): ICD-10-CM

## 2025-07-28 RX ORDER — ATORVASTATIN CALCIUM 20 MG/1
20 TABLET, FILM COATED ORAL DAILY
Qty: 90 TABLET | Refills: 3 | Status: CANCELLED | OUTPATIENT
Start: 2025-07-28

## 2025-07-28 RX ORDER — ATORVASTATIN CALCIUM 20 MG/1
20 TABLET, FILM COATED ORAL DAILY
Qty: 90 TABLET | Refills: 1 | Status: SHIPPED | OUTPATIENT
Start: 2025-07-28

## 2025-07-30 DIAGNOSIS — N52.9 ERECTILE DYSFUNCTION, UNSPECIFIED ERECTILE DYSFUNCTION TYPE: ICD-10-CM

## 2025-07-30 NOTE — TELEPHONE ENCOUNTER
[employer] message sent to patient.  Tg SARMIENTO BSN, PHN, RN-[facility]  [phone]

## 2025-07-31 RX ORDER — TADALAFIL 5 MG/1
TABLET ORAL
Qty: 24 TABLET | Refills: 0 | Status: SHIPPED | OUTPATIENT
Start: 2025-07-31

## 2025-07-31 NOTE — TELEPHONE ENCOUNTER
See pt response and request.    Tg SARMIENTO BSN, PHN, RN-[facility]  [phone]

## 2025-08-01 ENCOUNTER — MYC REFILL (OUTPATIENT)
Dept: FAMILY MEDICINE | Facility: CLINIC | Age: 50
End: 2025-08-01
Payer: COMMERCIAL

## 2025-08-01 DIAGNOSIS — N52.9 ERECTILE DYSFUNCTION, UNSPECIFIED ERECTILE DYSFUNCTION TYPE: ICD-10-CM

## 2025-08-04 RX ORDER — TADALAFIL 5 MG/1
5 TABLET ORAL
Qty: 24 TABLET | Refills: 1 | Status: SHIPPED | OUTPATIENT
Start: 2025-08-04

## (undated) DEVICE — SU VICRYL 2-0 CP-2 27" UND J869H

## (undated) DEVICE — LINEN TOWEL PACK X5 5464

## (undated) DEVICE — SOL NACL 0.9% IRRIG 500ML BOTTLE 2F7123

## (undated) DEVICE — SU MONOCRYL 4-0 PS-2 18" UND Y496G

## (undated) DEVICE — GLOVE PROTEXIS BLUE W/NEU-THERA 8.0  2D73EB80

## (undated) DEVICE — GLOVE PROTEXIS W/NEU-THERA 7.5  2D73TE75

## (undated) DEVICE — PREP CHLORAPREP 26ML TINTED ORANGE  260815

## (undated) DEVICE — PACK MINOR CUSTOM ASC

## (undated) DEVICE — BLADE CLIPPER SGL USE 9680

## (undated) DEVICE — DRSG STERI STRIP 1/4X3" R1541

## (undated) DEVICE — SU VICRYL 3-0 FS-1 27" J442H

## (undated) DEVICE — PAD CHUX UNDERPAD 30X30"